# Patient Record
Sex: FEMALE | Race: WHITE | NOT HISPANIC OR LATINO | Employment: FULL TIME | URBAN - METROPOLITAN AREA
[De-identification: names, ages, dates, MRNs, and addresses within clinical notes are randomized per-mention and may not be internally consistent; named-entity substitution may affect disease eponyms.]

---

## 2017-02-28 ENCOUNTER — APPOINTMENT (EMERGENCY)
Dept: RADIOLOGY | Facility: HOSPITAL | Age: 57
End: 2017-02-28
Payer: COMMERCIAL

## 2017-02-28 ENCOUNTER — HOSPITAL ENCOUNTER (EMERGENCY)
Facility: HOSPITAL | Age: 57
Discharge: HOME | End: 2017-02-28
Attending: EMERGENCY MEDICINE | Admitting: EMERGENCY MEDICINE
Payer: COMMERCIAL

## 2017-02-28 VITALS
RESPIRATION RATE: 16 BRPM | HEART RATE: 90 BPM | TEMPERATURE: 98.6 F | OXYGEN SATURATION: 100 % | HEIGHT: 66 IN | BODY MASS INDEX: 33.84 KG/M2 | WEIGHT: 210.54 LBS

## 2017-02-28 DIAGNOSIS — M19.071 ARTHRITIS OF RIGHT ANKLE: Primary | ICD-10-CM

## 2017-02-28 LAB — URATE SERPL-MCNC: 3.5 MG/DL (ref 2–6.8)

## 2017-02-28 PROCEDURE — 36415 COLL VENOUS BLD VENIPUNCTURE: CPT | Performed by: EMERGENCY MEDICINE

## 2017-02-28 PROCEDURE — 84550 ASSAY OF BLOOD/URIC ACID: CPT | Performed by: EMERGENCY MEDICINE

## 2017-02-28 PROCEDURE — 73610 X-RAY EXAM OF ANKLE: CPT

## 2017-02-28 PROCEDURE — 99283 EMERGENCY DEPT VISIT LOW MDM: CPT

## 2017-02-28 RX ORDER — IBUPROFEN 800 MG/1
800 TABLET ORAL EVERY 6 HOURS PRN
Qty: 30 TABLET | Refills: 0 | Status: SHIPPED | OUTPATIENT
Start: 2017-02-28 | End: 2019-01-20

## 2017-02-28 RX ORDER — SIMVASTATIN 40 MG
40 TABLET ORAL
COMMUNITY
End: 2019-01-20

## 2017-08-08 ENCOUNTER — TRANSCRIBE ORDERS (OUTPATIENT)
Dept: ADMINISTRATIVE | Facility: HOSPITAL | Age: 57
End: 2017-08-08

## 2017-08-08 ENCOUNTER — APPOINTMENT (OUTPATIENT)
Dept: LAB | Facility: HOSPITAL | Age: 57
End: 2017-08-08
Payer: COMMERCIAL

## 2017-08-08 DIAGNOSIS — Z00.8 HEALTH EXAMINATION IN POPULATION SURVEY: Primary | ICD-10-CM

## 2017-08-08 DIAGNOSIS — Z00.8 HEALTH EXAMINATION IN POPULATION SURVEY: ICD-10-CM

## 2017-08-08 LAB
CHOLEST SERPL-MCNC: 173 MG/DL (ref 50–200)
EST. AVERAGE GLUCOSE BLD GHB EST-MCNC: 114 MG/DL
HBA1C MFR BLD: 5.6 % (ref 4.2–6.3)
HDLC SERPL-MCNC: 58 MG/DL (ref 40–60)
LDLC SERPL CALC-MCNC: 90 MG/DL (ref 0–100)
TRIGL SERPL-MCNC: 127 MG/DL

## 2017-08-08 PROCEDURE — 80061 LIPID PANEL: CPT

## 2017-08-08 PROCEDURE — 36415 COLL VENOUS BLD VENIPUNCTURE: CPT

## 2017-08-08 PROCEDURE — 83036 HEMOGLOBIN GLYCOSYLATED A1C: CPT

## 2017-09-28 DIAGNOSIS — E78.5 HYPERLIPIDEMIA: ICD-10-CM

## 2017-09-28 DIAGNOSIS — E03.9 HYPOTHYROIDISM: ICD-10-CM

## 2017-09-28 DIAGNOSIS — K91.2 POSTSURGICAL MALABSORPTION, NOT ELSEWHERE CLASSIFIED: ICD-10-CM

## 2017-09-28 DIAGNOSIS — I10 ESSENTIAL (PRIMARY) HYPERTENSION: ICD-10-CM

## 2017-09-28 DIAGNOSIS — D50.8 OTHER IRON DEFICIENCY ANEMIAS: ICD-10-CM

## 2018-01-09 NOTE — RESULT NOTES
Verified Results  (1) CBC/PLT/DIFF 28Jun2016 08:12AM Chanelle Perkins   TW Order Number: [id number]  TW Order Number: [id number]     Test Name Result Flag Reference   WBC COUNT 7 01 Thousand/uL  4 31-10 16   RBC COUNT 4 35 Million/uL  3 81-5 12   HEMOGLOBIN 12 7 g/dL  11 5-15 4   HEMATOCRIT 38 9 %  34 8-46  1   MCV 89 fL  82-98   MCH 29 2 pg  26 8-34 3   MCHC 32 6 g/dL  31 4-37 4   RDW 13 2 %  11 6-15 1   MPV 10 5 fL  8 9-12 7   PLATELET COUNT 292 Thousands/uL  149-390   nRBC AUTOMATED 0 /100 WBCs     NEUTROPHILS RELATIVE PERCENT 55 %  43-75   LYMPHOCYTES RELATIVE PERCENT 35 %  14-44   MONOCYTES RELATIVE PERCENT 7 %  4-12   EOSINOPHILS RELATIVE PERCENT 2 %  0-6   BASOPHILS RELATIVE PERCENT 1 %  0-1   NEUTROPHILS ABSOLUTE COUNT 3 85 Thousands/?L  1 85-7 62   LYMPHOCYTES ABSOLUTE COUNT 2 48 Thousands/?L  0 60-4 47   MONOCYTES ABSOLUTE COUNT 0 52 Thousand/?L  0 17-1 22   EOSINOPHILS ABSOLUTE COUNT 0 11 Thousand/?L  0 00-0 61   BASOPHILS ABSOLUTE COUNT 0 04 Thousands/?L  0 00-0 10     (1) IRON 28Jun2016 08:12AM Chanelle Perkins   TW Order Number: [id number]  TW Order Number: [id number] Order Number: [id number] Order Number: [id number] Order Number: [id number] Order Number: [id number] Order Number: [id number]     Test Name Result Flag Reference   IRON 56 ug/dL       (1) FERRITIN [id number] 08:12AM Chanelle Perkins   TW Order Number: [id number]  TW Order Number: [id number] Order Number: [id number] Order Number: [id number] Order Number: [id number] Order Number: [id number] Order Number: [id number]     Test Name Result Flag Reference   FERRITIN 12 ng/mL  8-388     (1) VITAMIN B12 [id number] 08:12AM Letitia Garcia, [address] [place] [facility] Order Number: [id number]  TW Order Number: [id number] Order Number: [id number] Order Number: [id number] Order Number: [id number] Order Number: [id number] Order Number: [id number]     Test Name Result Flag Reference   VITAMIN B12 298 pg/mL  100-900     (1) VITAMIN D 25-HYDROXY 28Jun2016 08:12AM Mich Pretty    Order Number: [id number]  TW Order Number: [id number]     Test Name Result Flag Reference   VIT D 25-HYDROX 17 1 ng/mL L 30 0-100 0   This assay is a certified procedure of the CDC Vitamin D Standardization Certification Program (VDSCP)     Deficiency <20ng/ml   Insufficiency 20-30ng/ml   Sufficient  ng/ml     *Patients undergoing fluorescein dye angiography may retain small amounts of fluorescein in the body for 48-72 hours post procedure  Samples containing fluorescein can produce falsely elevated Vitamin D values  If the patient had this procedure, a specimen should be resubmitted post fluorescein clearance  (1) COMPREHENSIVE METABOLIC PANEL [id number] 15:33EO Mich Pretty    Order Number: [id number]  TW Order Number: [id number] Order Number: [id number] Order Number: [id number] Order Number: [id number] Order Number: [id number] Order Number: [id number]     Test Name Result Flag Reference   GLUCOSE,RANDM 78 mg/dL     If the patient is fasting, the ADA then defines impaired fasting glucose as > 100 mg/dL and diabetes as > or equal to 123 mg/dL     SODIUM 138 mmol/L  136-145   POTASSIUM 4 2 mmol/L  3 5-5 3   CHLORIDE 106 mmol/L  100-108   CARBON DIOXIDE 28 mmol/L  21-32   ANION GAP (CALC) 4 mmol/L  4-13   BLOOD UREA NITROGEN 23 mg/dL  5-25   CREATININE 0 63 mg/dL  0 60-1 30   Standardized to IDMS reference method   CALCIUM 8 8 mg/dL  8 3-10 1   BILI, TOTAL 0 24 mg/dL  0 20-1 00   ALK PHOSPHATAS 79 U/L     ALT (SGPT) 32 U/L  12-78   AST(SGOT) 19 U/L  5-45   ALBUMIN 3 3 g/dL L 3 5-5 0   TOTAL PROTEIN 7 2 g/dL  6 4-8 2 eGFR Non-African American      >60 0 ml/min/1 73sq m   [facility] Disease Education Program recommendations are as follows:  GFR calculation is accurate only with a steady state creatinine  Chronic Kidney disease less than 60 ml/min/1 73 sq  meters  Kidney failure less than 15 ml/min/1 73 sq  meters  (1) LIPID PANEL, FASTING 28Jun2016 08:12AM Ellen Catalan   TW Order Number: [id number]  TW Order Number: [id number] Order Number: [id number] Order Number: [id number] Order Number: [id number] Order Number: [id number] Order Number: [id number]     Test Name Result Flag Reference   CHOLESTEROL 141 mg/dL     HDL,DIRECT 47 mg/dL  40-60   Specimen collection should occur prior to Metamizole administration due to the potential for falsely depressed results  LDL CHOLESTEROL CALCULATED 67 mg/dL  0-100   Triglyceride:         Normal              <150 mg/dl       Borderline High    150-199 mg/dl       High               200-499 mg/dl       Very High          >499 mg/dl  Cholesterol:         Desirable        <200 mg/dl      Borderline High  200-239 mg/dl      High             >239 mg/dl  HDL Cholesterol:        High    >59 mg/dL      Low     <41 mg/dL  LDL CALCULATED:    This screening LDL is a calculated result  It does not have the accuracy of the Direct Measured LDL in the monitoring of patients with hyperlipidemia and/or statin therapy  Direct Measure LDL (BQO287) must be ordered separately in these patients  TRIGLYCERIDES 133 mg/dL  <=150   Specimen collection should occur prior to N-Acetylcysteine or Metamizole administration due to the potential for falsely depressed results       (1) TSH 51VCC1221 08:12AM Silvio Lai, [address] Order Number: [id number]  TW Order Number: [id number] Order Number: [id number] Order Number: [id number] Order Number: [id number]

## 2018-01-10 NOTE — MISCELLANEOUS
Message   Recorded as Task   Date: 06/24/2016 10:00 AM, Created By: Kamla Pate   Task Name: Follow Up   Assigned To: Elizabeth Edge   Regarding Patient: Jose Armenta, Status: Active   Comment:    Lidya Berg - 24 Jun 2016 10:00 AM     TASK CREATED  Please call patient to schedule yearly follow up appt  Thanks   unable to leave message for pt      Active Problems    1  Arthritis (716 90) (M19 90)   2  Benign diastolic hypertension (053 7) (I10)   3  Encounter for screening for malignant neoplasm of colon (V76 51) (Z12 11)   4  Encounter for screening mammogram for malignant neoplasm of breast (V76 12)   (Z12 31)   5  Hyperlipidemia (272 4) (E78 5)   6  Hypothyroidism (244 9) (E03 9)   7  Iron deficiency anemia secondary to inadequate dietary iron intake (280 1) (D50 8)   8  Need for influenza vaccination (V04 81) (Z23)   9  Obesity (278 00) (E66 9)   10  Postgastrectomy malabsorption (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) (K91 2,Z90 3)   11  Screening for osteoporosis (V82 81) (Z13 820)   12  Status post gastric bypass for obesity (V45 86) (Z98 84)   13  Umbilical hernia (091 3) (K42 9)    Current Meds   1  Biotin 10 MG Oral Tablet; Therapy: 78JFV1610 to Recorded   2  Calcium 1000 + D 1000-800 MG-UNIT Oral Tablet; Therapy: 88HGU4138 to Recorded   3  Levothyroxine Sodium 75 MCG Oral Tablet; TAKE 1 TABLET DAILY AS DIRECTED; Therapy: 58BTN4774 to (Renew:05Apr2016); Last Rx:08Oct2015 Ordered   4  Simvastatin 40 MG Oral Tablet; take one tablet by mouth every day; Therapy: 46UKZ7308 to (Kelly Jama)  Requested for: 58LGF8910; Last   Rx:46Wfl0807 Ordered    Allergies    1   No Known Drug Allergies    Signatures   Electronically signed by : Huy Aj, ; Jun 27 2016  3:34PM EST                       (Author)

## 2018-05-24 ENCOUNTER — OFFICE VISIT (OUTPATIENT)
Dept: URGENT CARE | Facility: CLINIC | Age: 58
End: 2018-05-24
Payer: COMMERCIAL

## 2018-05-24 VITALS
BODY MASS INDEX: 37.12 KG/M2 | DIASTOLIC BLOOD PRESSURE: 92 MMHG | HEART RATE: 92 BPM | SYSTOLIC BLOOD PRESSURE: 140 MMHG | WEIGHT: 231 LBS | TEMPERATURE: 97.9 F | OXYGEN SATURATION: 99 % | RESPIRATION RATE: 16 BRPM | HEIGHT: 66 IN

## 2018-05-24 DIAGNOSIS — H65.91 RIGHT NON-SUPPURATIVE OTITIS MEDIA: ICD-10-CM

## 2018-05-24 DIAGNOSIS — H10.33 ACUTE BACTERIAL CONJUNCTIVITIS OF BOTH EYES: Primary | ICD-10-CM

## 2018-05-24 PROCEDURE — S9088 SERVICES PROVIDED IN URGENT: HCPCS

## 2018-05-24 PROCEDURE — 99203 OFFICE O/P NEW LOW 30 MIN: CPT

## 2018-05-24 RX ORDER — OFLOXACIN 3 MG/ML
1 SOLUTION/ DROPS OPHTHALMIC 4 TIMES DAILY
Qty: 5 ML | Refills: 0 | Status: SHIPPED | OUTPATIENT
Start: 2018-05-24 | End: 2019-01-20

## 2018-05-24 RX ORDER — AMOXICILLIN 500 MG/1
500 CAPSULE ORAL EVERY 8 HOURS SCHEDULED
Qty: 21 CAPSULE | Refills: 0 | Status: SHIPPED | OUTPATIENT
Start: 2018-05-24 | End: 2018-05-31

## 2018-05-24 NOTE — PATIENT INSTRUCTIONS
Follow up with clinic  Return if no improvement within 3 days or sooner if symptoms worsenConjunctivitis   WHAT YOU SHOULD KNOW:   Conjunctivitis, or pink eye, is inflammation of your conjunctiva  The conjunctiva is a thin tissue that covers the front of your eye and the back of your eyelids  The conjunctiva helps protect your eye and keep it moist         INSTRUCTIONS:   Medicines:   · Allergy medicine: This medicine helps decrease itchy, red, swollen eyes caused by allergies  It may be given as a pill, eye drops, or nasal spray  · Antibiotics:  You will need antibiotics if your conjunctivitis is caused by bacteria  This medicine may be given as eye drops or eye ointment  · Steroid medicine: This medicine helps decrease inflammation  It may be given as a pill, eye drops, or nasal spray  · Take your medicine as directed  Call your healthcare provider if you think your medicine is not helping or if you have side effects  Tell him if you are allergic to any medicine  Keep a list of the medicines, vitamins, and herbs you take  Include the amounts, and when and why you take them  Bring the list or the pill bottles to follow-up visits  Carry your medicine list with you in case of an emergency  Follow up with your primary healthcare provider as directed: You may need to return for more tests on your eyes  These will help your primary healthcare provider check for eye damage  Write down your questions so you remember to ask them during your visits  Avoid the spread of conjunctivitis:   · Wash your hands often:  Wash your hands before you touch your eyes  Also wash your hands before you prepare or eat food and after you use the bathroom or change a diaper  · Avoid allergens:  Try to avoid the things that cause your allergies, such as pets, dust, or grass  · Avoid contact:  Do not share towels or washcloths  Try to stay away from others as much as possible  Ask when you can return to work or school  · Throw away eye makeup:  Throw away mascara and other eye makeup  Manage your symptoms:  · Apply a cool compress:  Wet a washcloth with cold water and place it on your eye  This will help decrease swelling  · Use eye drops:  Eye drops, or artificial tears, can be bought without a doctor's order  They help keep your eye moist     · Do not wear contact lenses: They can irritate your eye  Throw away the pair you are using and ask when you can wear them again  Use a new pair of lenses when your primary healthcare provider says it is okay  · Flush your eye:  You may need to flush your eye with saline to help decrease your symptoms  Ask for more information on how to flush your eye  Contact your primary healthcare provider if:   · Your eyesight becomes blurry  · You have tiny bumps or spots of blood on your eye  · You have questions or concerns about your condition or care  Return to the emergency department if:   · The swelling in your eye gets worse, even after treatment  · Your vision suddenly becomes worse or you cannot see at all  · Your eye begins to bleed  © 2014 [address] is for End User's use only and may not be sold, redistributed or otherwise used for commercial purposes  All illustrations and images included in CareNotes® are the copyrighted property of [employer] , [place]  or Negrito Walsh  The above information is an  only  It is not intended as medical advice for individual conditions or treatments  Talk to your doctor, nurse or pharmacist before following any medical regimen to see if it is safe and effective for you

## 2018-05-24 NOTE — PROGRESS NOTES
Assessment/Plan:    Follow up with clinic  Return if no improvement within 3 days or sooner if symptoms worsen      No problem-specific Assessment & Plan notes found for this encounter  Diagnoses and all orders for this visit:    Acute bacterial conjunctivitis of both eyes  -     ofloxacin (OCUFLOX) 0 3 % ophthalmic solution; Administer 1 drop to both eyes 4 (four) times a day    Right non-suppurative otitis media  -     amoxicillin (AMOXIL) 500 mg capsule; Take 1 capsule (500 mg total) by mouth every 8 (eight) hours for 7 days          Subjective:      Patient ID: Harvis Hamman is a 62 y o  female  63 y/o female with no PMH c/o cough productive of yellow sputum, bilateral ear pain right greater than left, and red eyes with discharge x 2 days  Patient states she started coughing 2 days ago and yesterday she woke up with red eyes and crust over eye lashes but no eye pain or change in vision  Patient also c/o pain to both ears  Denies fever, chills, SOB, CP or other      Sore Throat    Associated symptoms include coughing and ear pain  Pertinent negatives include no shortness of breath or stridor  Cough   Associated symptoms include ear pain, eye redness and a sore throat  Pertinent negatives include no chest pain, shortness of breath or wheezing  Earache    Associated symptoms include coughing and a sore throat  Eye Pain    Associated symptoms include an eye discharge, eye redness and itching  Pertinent negatives include no photophobia  The following portions of the patient's history were reviewed and updated as appropriate: allergies, current medications, past family history, past medical history, past social history, past surgical history and problem list     Review of Systems   Constitutional: Negative  HENT: Positive for ear pain and sore throat  Eyes: Positive for discharge, redness and itching  Negative for photophobia and pain  Respiratory: Positive for cough   Negative for chest tightness, shortness of breath, wheezing and stridor  Cardiovascular: Negative for chest pain, palpitations and leg swelling  Objective:      /92 (BP Location: Left arm, Patient Position: Sitting, Cuff Size: Large)   Pulse 92   Temp 97 9 °F (36 6 °C) (Tympanic)   Resp 16   Ht 5' 6" (1 676 m)   Wt 105 kg (231 lb)   SpO2 99%   BMI 37 28 kg/m²          Physical Exam   Constitutional: She appears well-developed and well-nourished  No distress  HENT:   Head: Normocephalic and atraumatic  Right Ear: Hearing, external ear and ear canal normal    Left Ear: Hearing, tympanic membrane, external ear and ear canal normal    Ears:    Mouth/Throat: Uvula is midline, oropharynx is clear and moist and mucous membranes are normal    Eyes: Lids are normal  Lids are everted and swept, no foreign bodies found  Cardiovascular: Normal rate, regular rhythm and normal heart sounds  Pulmonary/Chest: Effort normal and breath sounds normal    Abdominal: Soft  Bowel sounds are normal    Skin: She is not diaphoretic

## 2018-05-31 ENCOUNTER — TELEPHONE (OUTPATIENT)
Dept: BARIATRICS | Facility: CLINIC | Age: 58
End: 2018-05-31

## 2018-07-18 ENCOUNTER — APPOINTMENT (OUTPATIENT)
Dept: LAB | Facility: HOSPITAL | Age: 58
End: 2018-07-18

## 2018-07-18 ENCOUNTER — TRANSCRIBE ORDERS (OUTPATIENT)
Dept: ADMINISTRATIVE | Facility: HOSPITAL | Age: 58
End: 2018-07-18

## 2018-07-18 DIAGNOSIS — Z00.8 HEALTH EXAMINATION IN POPULATION SURVEY: ICD-10-CM

## 2018-07-18 DIAGNOSIS — Z00.8 HEALTH EXAMINATION IN POPULATION SURVEY: Primary | ICD-10-CM

## 2018-07-18 LAB
CHOLEST SERPL-MCNC: 164 MG/DL (ref 50–200)
EST. AVERAGE GLUCOSE BLD GHB EST-MCNC: 108 MG/DL
HBA1C MFR BLD: 5.4 % (ref 4.2–6.3)
HDLC SERPL-MCNC: 46 MG/DL (ref 40–60)
LDLC SERPL CALC-MCNC: 95 MG/DL (ref 0–100)
NONHDLC SERPL-MCNC: 118 MG/DL
TRIGL SERPL-MCNC: 116 MG/DL

## 2018-07-18 PROCEDURE — 80061 LIPID PANEL: CPT

## 2018-07-18 PROCEDURE — 83036 HEMOGLOBIN GLYCOSYLATED A1C: CPT

## 2018-07-18 PROCEDURE — 36415 COLL VENOUS BLD VENIPUNCTURE: CPT

## 2019-01-20 ENCOUNTER — ANESTHESIA EVENT (EMERGENCY)
Dept: PERIOP | Facility: HOSPITAL | Age: 59
DRG: 329 | End: 2019-01-20
Payer: COMMERCIAL

## 2019-01-20 ENCOUNTER — APPOINTMENT (EMERGENCY)
Dept: CT IMAGING | Facility: HOSPITAL | Age: 59
DRG: 329 | End: 2019-01-20
Payer: COMMERCIAL

## 2019-01-20 ENCOUNTER — HOSPITAL ENCOUNTER (INPATIENT)
Facility: HOSPITAL | Age: 59
LOS: 3 days | Discharge: HOME | DRG: 329 | End: 2019-01-24
Attending: EMERGENCY MEDICINE | Admitting: ANESTHESIOLOGY
Payer: COMMERCIAL

## 2019-01-20 ENCOUNTER — ANESTHESIA (EMERGENCY)
Dept: PERIOP | Facility: HOSPITAL | Age: 59
DRG: 329 | End: 2019-01-20
Payer: COMMERCIAL

## 2019-01-20 DIAGNOSIS — Z98.890 S/P EXPLORATORY LAPAROTOMY: ICD-10-CM

## 2019-01-20 DIAGNOSIS — I10 HYPERTENSION: ICD-10-CM

## 2019-01-20 DIAGNOSIS — K45.8 INTERNAL HERNIA: Primary | ICD-10-CM

## 2019-01-20 DIAGNOSIS — K55.029 ISCHEMIC NECROSIS OF SMALL BOWEL (HCC): ICD-10-CM

## 2019-01-20 LAB
ABO GROUP BLD: NORMAL
ALBUMIN SERPL BCP-MCNC: 3.7 G/DL (ref 3.5–5)
ALP SERPL-CCNC: 93 U/L (ref 46–116)
ALT SERPL W P-5'-P-CCNC: 26 U/L (ref 12–78)
ANION GAP SERPL CALCULATED.3IONS-SCNC: 10 MMOL/L (ref 4–13)
AST SERPL W P-5'-P-CCNC: 19 U/L (ref 5–45)
BASOPHILS # BLD AUTO: 0.05 THOUSANDS/ΜL (ref 0–0.1)
BASOPHILS NFR BLD AUTO: 0 % (ref 0–1)
BILIRUB SERPL-MCNC: 0.2 MG/DL (ref 0.2–1)
BILIRUB UR QL STRIP: NEGATIVE
BLD GP AB SCN SERPL QL: NEGATIVE
BUN SERPL-MCNC: 25 MG/DL (ref 5–25)
CALCIUM SERPL-MCNC: 9 MG/DL (ref 8.3–10.1)
CHLORIDE SERPL-SCNC: 105 MMOL/L (ref 100–108)
CLARITY UR: CLEAR
CO2 SERPL-SCNC: 26 MMOL/L (ref 21–32)
COLOR UR: YELLOW
CREAT SERPL-MCNC: 0.79 MG/DL (ref 0.6–1.3)
EOSINOPHIL # BLD AUTO: 0.11 THOUSAND/ΜL (ref 0–0.61)
EOSINOPHIL NFR BLD AUTO: 1 % (ref 0–6)
ERYTHROCYTE [DISTWIDTH] IN BLOOD BY AUTOMATED COUNT: 13.1 % (ref 11.6–15.1)
GFR SERPL CREATININE-BSD FRML MDRD: 83 ML/MIN/1.73SQ M
GLUCOSE SERPL-MCNC: 157 MG/DL (ref 65–140)
GLUCOSE UR STRIP-MCNC: NEGATIVE MG/DL
HCT VFR BLD AUTO: 38.7 % (ref 34.8–46.1)
HGB BLD-MCNC: 12.6 G/DL (ref 11.5–15.4)
HGB UR QL STRIP.AUTO: NEGATIVE
IMM GRANULOCYTES # BLD AUTO: 0.09 THOUSAND/UL (ref 0–0.2)
IMM GRANULOCYTES NFR BLD AUTO: 1 % (ref 0–2)
INR PPP: 1.01 (ref 0.86–1.17)
KETONES UR STRIP-MCNC: ABNORMAL MG/DL
LACTATE SERPL-SCNC: 1.2 MMOL/L (ref 0.5–2)
LEUKOCYTE ESTERASE UR QL STRIP: NEGATIVE
LYMPHOCYTES # BLD AUTO: 2.01 THOUSANDS/ΜL (ref 0.6–4.47)
LYMPHOCYTES NFR BLD AUTO: 11 % (ref 14–44)
MCH RBC QN AUTO: 28.3 PG (ref 26.8–34.3)
MCHC RBC AUTO-ENTMCNC: 32.6 G/DL (ref 31.4–37.4)
MCV RBC AUTO: 87 FL (ref 82–98)
MONOCYTES # BLD AUTO: 0.6 THOUSAND/ΜL (ref 0.17–1.22)
MONOCYTES NFR BLD AUTO: 3 % (ref 4–12)
NEUTROPHILS # BLD AUTO: 15.11 THOUSANDS/ΜL (ref 1.85–7.62)
NEUTS SEG NFR BLD AUTO: 84 % (ref 43–75)
NITRITE UR QL STRIP: NEGATIVE
NRBC BLD AUTO-RTO: 0 /100 WBCS
PH UR STRIP.AUTO: 7 [PH] (ref 4.5–8)
PLATELET # BLD AUTO: 363 THOUSANDS/UL (ref 149–390)
PMV BLD AUTO: 10.3 FL (ref 8.9–12.7)
POTASSIUM SERPL-SCNC: 3.6 MMOL/L (ref 3.5–5.3)
PROT SERPL-MCNC: 7.7 G/DL (ref 6.4–8.2)
PROT UR STRIP-MCNC: NEGATIVE MG/DL
PROTHROMBIN TIME: 13.2 SECONDS (ref 11.8–14.2)
RBC # BLD AUTO: 4.45 MILLION/UL (ref 3.81–5.12)
RH BLD: POSITIVE
SODIUM SERPL-SCNC: 141 MMOL/L (ref 136–145)
SP GR UR STRIP.AUTO: 1.01 (ref 1–1.03)
SPECIMEN EXPIRATION DATE: NORMAL
TROPONIN I SERPL-MCNC: <0.02 NG/ML
UROBILINOGEN UR QL STRIP.AUTO: 0.2 E.U./DL
WBC # BLD AUTO: 17.97 THOUSAND/UL (ref 4.31–10.16)

## 2019-01-20 PROCEDURE — 86850 RBC ANTIBODY SCREEN: CPT | Performed by: EMERGENCY MEDICINE

## 2019-01-20 PROCEDURE — 44120 REMOVAL OF SMALL INTESTINE: CPT | Performed by: SURGERY

## 2019-01-20 PROCEDURE — 86901 BLOOD TYPING SEROLOGIC RH(D): CPT | Performed by: EMERGENCY MEDICINE

## 2019-01-20 PROCEDURE — 99244 OFF/OP CNSLTJ NEW/EST MOD 40: CPT | Performed by: SURGERY

## 2019-01-20 PROCEDURE — 36415 COLL VENOUS BLD VENIPUNCTURE: CPT | Performed by: EMERGENCY MEDICINE

## 2019-01-20 PROCEDURE — 96361 HYDRATE IV INFUSION ADD-ON: CPT

## 2019-01-20 PROCEDURE — 83605 ASSAY OF LACTIC ACID: CPT | Performed by: EMERGENCY MEDICINE

## 2019-01-20 PROCEDURE — 96374 THER/PROPH/DIAG INJ IV PUSH: CPT

## 2019-01-20 PROCEDURE — 44120 REMOVAL OF SMALL INTESTINE: CPT | Performed by: PHYSICIAN ASSISTANT

## 2019-01-20 PROCEDURE — 80053 COMPREHEN METABOLIC PANEL: CPT | Performed by: EMERGENCY MEDICINE

## 2019-01-20 PROCEDURE — 81003 URINALYSIS AUTO W/O SCOPE: CPT | Performed by: EMERGENCY MEDICINE

## 2019-01-20 PROCEDURE — 85610 PROTHROMBIN TIME: CPT | Performed by: EMERGENCY MEDICINE

## 2019-01-20 PROCEDURE — 86900 BLOOD TYPING SEROLOGIC ABO: CPT | Performed by: EMERGENCY MEDICINE

## 2019-01-20 PROCEDURE — 84484 ASSAY OF TROPONIN QUANT: CPT | Performed by: EMERGENCY MEDICINE

## 2019-01-20 PROCEDURE — 99285 EMERGENCY DEPT VISIT HI MDM: CPT

## 2019-01-20 PROCEDURE — 74177 CT ABD & PELVIS W/CONTRAST: CPT

## 2019-01-20 PROCEDURE — 85025 COMPLETE CBC W/AUTO DIFF WBC: CPT | Performed by: EMERGENCY MEDICINE

## 2019-01-20 PROCEDURE — 93005 ELECTROCARDIOGRAM TRACING: CPT

## 2019-01-20 PROCEDURE — 0DB80ZZ EXCISION OF SMALL INTESTINE, OPEN APPROACH: ICD-10-PCS | Performed by: SURGERY

## 2019-01-20 PROCEDURE — 96375 TX/PRO/DX INJ NEW DRUG ADDON: CPT

## 2019-01-20 RX ORDER — ONDANSETRON 2 MG/ML
4 INJECTION INTRAMUSCULAR; INTRAVENOUS ONCE
Status: COMPLETED | OUTPATIENT
Start: 2019-01-20 | End: 2019-01-20

## 2019-01-20 RX ORDER — HYDROMORPHONE HCL/PF 1 MG/ML
1 SYRINGE (ML) INJECTION ONCE
Status: COMPLETED | OUTPATIENT
Start: 2019-01-20 | End: 2019-01-20

## 2019-01-20 RX ORDER — MORPHINE SULFATE 10 MG/ML
6 INJECTION, SOLUTION INTRAMUSCULAR; INTRAVENOUS ONCE
Status: COMPLETED | OUTPATIENT
Start: 2019-01-20 | End: 2019-01-20

## 2019-01-20 RX ORDER — DIPHENHYDRAMINE HYDROCHLORIDE 50 MG/ML
50 INJECTION INTRAMUSCULAR; INTRAVENOUS ONCE
Status: COMPLETED | OUTPATIENT
Start: 2019-01-20 | End: 2019-01-20

## 2019-01-20 RX ADMIN — HYDROMORPHONE HYDROCHLORIDE 1 MG: 1 INJECTION, SOLUTION INTRAMUSCULAR; INTRAVENOUS; SUBCUTANEOUS at 21:03

## 2019-01-20 RX ADMIN — MIDAZOLAM HYDROCHLORIDE 2 MG: 1 INJECTION, SOLUTION INTRAMUSCULAR; INTRAVENOUS at 23:38

## 2019-01-20 RX ADMIN — IOHEXOL 50 ML: 240 INJECTION, SOLUTION INTRATHECAL; INTRAVASCULAR; INTRAVENOUS; ORAL at 21:59

## 2019-01-20 RX ADMIN — Medication 140 MG: at 23:45

## 2019-01-20 RX ADMIN — IOHEXOL 100 ML: 350 INJECTION, SOLUTION INTRAVENOUS at 20:46

## 2019-01-20 RX ADMIN — FENTANYL CITRATE 100 MCG: 50 INJECTION, SOLUTION INTRAMUSCULAR; INTRAVENOUS at 23:45

## 2019-01-20 RX ADMIN — LIDOCAINE HYDROCHLORIDE 50 MG: 10 INJECTION, SOLUTION INFILTRATION; PERINEURAL at 23:45

## 2019-01-20 RX ADMIN — SODIUM CHLORIDE: 0.9 INJECTION, SOLUTION INTRAVENOUS at 23:30

## 2019-01-20 RX ADMIN — SODIUM CHLORIDE 1000 ML: 0.9 INJECTION, SOLUTION INTRAVENOUS at 22:31

## 2019-01-20 RX ADMIN — DIPHENHYDRAMINE HYDROCHLORIDE 50 MG: 50 INJECTION INTRAMUSCULAR; INTRAVENOUS at 21:03

## 2019-01-20 RX ADMIN — PROPOFOL 100 MG: 10 INJECTION, EMULSION INTRAVENOUS at 23:45

## 2019-01-20 RX ADMIN — MORPHINE SULFATE 6 MG: 10 INJECTION INTRAVENOUS at 20:01

## 2019-01-20 RX ADMIN — ONDANSETRON 4 MG: 2 INJECTION INTRAMUSCULAR; INTRAVENOUS at 19:57

## 2019-01-21 PROBLEM — K55.029 ISCHEMIC NECROSIS OF SMALL BOWEL (HCC): Status: ACTIVE | Noted: 2019-01-21

## 2019-01-21 PROBLEM — I10 HYPERTENSION: Status: ACTIVE | Noted: 2019-01-21

## 2019-01-21 PROBLEM — E07.9 DISEASE OF THYROID GLAND: Status: ACTIVE | Noted: 2019-01-21

## 2019-01-21 PROBLEM — K45.8 INTERNAL HERNIA: Status: ACTIVE | Noted: 2019-01-21

## 2019-01-21 LAB
ALBUMIN SERPL BCP-MCNC: 3 G/DL (ref 3.5–5)
ALP SERPL-CCNC: 80 U/L (ref 46–116)
ALT SERPL W P-5'-P-CCNC: 21 U/L (ref 12–78)
ANION GAP SERPL CALCULATED.3IONS-SCNC: 8 MMOL/L (ref 4–13)
ANISOCYTOSIS BLD QL SMEAR: PRESENT
AST SERPL W P-5'-P-CCNC: 24 U/L (ref 5–45)
ATRIAL RATE: 50 BPM
ATRIAL RATE: 55 BPM
ATRIAL RATE: 55 BPM
BASOPHILS # BLD MANUAL: 0 THOUSAND/UL (ref 0–0.1)
BASOPHILS NFR MAR MANUAL: 0 % (ref 0–1)
BILIRUB SERPL-MCNC: 0.2 MG/DL (ref 0.2–1)
BUN SERPL-MCNC: 19 MG/DL (ref 5–25)
CA-I BLD-SCNC: 1.13 MMOL/L (ref 1.12–1.32)
CALCIUM SERPL-MCNC: 8.4 MG/DL (ref 8.3–10.1)
CHLORIDE SERPL-SCNC: 107 MMOL/L (ref 100–108)
CO2 SERPL-SCNC: 26 MMOL/L (ref 21–32)
CREAT SERPL-MCNC: 0.8 MG/DL (ref 0.6–1.3)
EOSINOPHIL # BLD MANUAL: 0 THOUSAND/UL (ref 0–0.4)
EOSINOPHIL NFR BLD MANUAL: 0 % (ref 0–6)
ERYTHROCYTE [DISTWIDTH] IN BLOOD BY AUTOMATED COUNT: 13.2 % (ref 11.6–15.1)
GFR SERPL CREATININE-BSD FRML MDRD: 82 ML/MIN/1.73SQ M
GLUCOSE SERPL-MCNC: 110 MG/DL (ref 65–140)
GLUCOSE SERPL-MCNC: 155 MG/DL (ref 65–140)
GLUCOSE SERPL-MCNC: 158 MG/DL (ref 65–140)
HCT VFR BLD AUTO: 34.5 % (ref 34.8–46.1)
HGB BLD-MCNC: 11 G/DL (ref 11.5–15.4)
INR PPP: 1.18 (ref 0.86–1.17)
LYMPHOCYTES # BLD AUTO: 1.17 THOUSAND/UL (ref 0.6–4.47)
LYMPHOCYTES # BLD AUTO: 7 % (ref 14–44)
MAGNESIUM SERPL-MCNC: 1.8 MG/DL (ref 1.6–2.6)
MCH RBC QN AUTO: 28.1 PG (ref 26.8–34.3)
MCHC RBC AUTO-ENTMCNC: 31.9 G/DL (ref 31.4–37.4)
MCV RBC AUTO: 88 FL (ref 82–98)
MONOCYTES # BLD AUTO: 1.17 THOUSAND/UL (ref 0–1.22)
MONOCYTES NFR BLD: 7 % (ref 4–12)
NEUTROPHILS # BLD MANUAL: 14.39 THOUSAND/UL (ref 1.85–7.62)
NEUTS BAND NFR BLD MANUAL: 6 % (ref 0–8)
NEUTS SEG NFR BLD AUTO: 80 % (ref 43–75)
NRBC BLD AUTO-RTO: 0 /100 WBCS
P AXIS: 53 DEGREES
P AXIS: 63 DEGREES
P AXIS: 67 DEGREES
PHOSPHATE SERPL-MCNC: 3.3 MG/DL (ref 2.7–4.5)
PLATELET # BLD AUTO: 277 THOUSANDS/UL (ref 149–390)
PLATELET BLD QL SMEAR: ADEQUATE
PMV BLD AUTO: 9.9 FL (ref 8.9–12.7)
POTASSIUM SERPL-SCNC: 4 MMOL/L (ref 3.5–5.3)
PR INTERVAL: 168 MS
PR INTERVAL: 168 MS
PR INTERVAL: 176 MS
PROCALCITONIN SERPL-MCNC: <0.05 NG/ML
PROT SERPL-MCNC: 6.7 G/DL (ref 6.4–8.2)
PROTHROMBIN TIME: 14.9 SECONDS (ref 11.8–14.2)
QRS AXIS: 31 DEGREES
QRS AXIS: 33 DEGREES
QRS AXIS: 44 DEGREES
QRSD INTERVAL: 90 MS
QRSD INTERVAL: 96 MS
QRSD INTERVAL: 98 MS
QT INTERVAL: 482 MS
QT INTERVAL: 498 MS
QT INTERVAL: 508 MS
QTC INTERVAL: 461 MS
QTC INTERVAL: 463 MS
QTC INTERVAL: 476 MS
RBC # BLD AUTO: 3.92 MILLION/UL (ref 3.81–5.12)
SODIUM SERPL-SCNC: 141 MMOL/L (ref 136–145)
T WAVE AXIS: 49 DEGREES
T WAVE AXIS: 61 DEGREES
T WAVE AXIS: 62 DEGREES
TOTAL CELLS COUNTED SPEC: 100
VENTRICULAR RATE: 50 BPM
VENTRICULAR RATE: 55 BPM
VENTRICULAR RATE: 55 BPM
WBC # BLD AUTO: 16.73 THOUSAND/UL (ref 4.31–10.16)

## 2019-01-21 PROCEDURE — G8979 MOBILITY GOAL STATUS: HCPCS

## 2019-01-21 PROCEDURE — 93010 ELECTROCARDIOGRAM REPORT: CPT | Performed by: INTERNAL MEDICINE

## 2019-01-21 PROCEDURE — C9113 INJ PANTOPRAZOLE SODIUM, VIA: HCPCS | Performed by: NURSE PRACTITIONER

## 2019-01-21 PROCEDURE — 85027 COMPLETE CBC AUTOMATED: CPT | Performed by: NURSE PRACTITIONER

## 2019-01-21 PROCEDURE — 85610 PROTHROMBIN TIME: CPT | Performed by: NURSE PRACTITIONER

## 2019-01-21 PROCEDURE — 82948 REAGENT STRIP/BLOOD GLUCOSE: CPT

## 2019-01-21 PROCEDURE — 83735 ASSAY OF MAGNESIUM: CPT | Performed by: NURSE PRACTITIONER

## 2019-01-21 PROCEDURE — 82330 ASSAY OF CALCIUM: CPT | Performed by: NURSE PRACTITIONER

## 2019-01-21 PROCEDURE — 88307 TISSUE EXAM BY PATHOLOGIST: CPT | Performed by: PATHOLOGY

## 2019-01-21 PROCEDURE — G8978 MOBILITY CURRENT STATUS: HCPCS

## 2019-01-21 PROCEDURE — 97163 PT EVAL HIGH COMPLEX 45 MIN: CPT

## 2019-01-21 PROCEDURE — 84100 ASSAY OF PHOSPHORUS: CPT | Performed by: NURSE PRACTITIONER

## 2019-01-21 PROCEDURE — 85007 BL SMEAR W/DIFF WBC COUNT: CPT | Performed by: NURSE PRACTITIONER

## 2019-01-21 PROCEDURE — 84145 PROCALCITONIN (PCT): CPT | Performed by: NURSE PRACTITIONER

## 2019-01-21 PROCEDURE — 80053 COMPREHEN METABOLIC PANEL: CPT | Performed by: NURSE PRACTITIONER

## 2019-01-21 PROCEDURE — 99222 1ST HOSP IP/OBS MODERATE 55: CPT | Performed by: STUDENT IN AN ORGANIZED HEALTH CARE EDUCATION/TRAINING PROGRAM

## 2019-01-21 RX ORDER — PANTOPRAZOLE SODIUM 40 MG/1
40 INJECTION, POWDER, FOR SOLUTION INTRAVENOUS EVERY 12 HOURS SCHEDULED
Status: DISCONTINUED | OUTPATIENT
Start: 2019-01-21 | End: 2019-01-24 | Stop reason: HOSPADM

## 2019-01-21 RX ORDER — SUCCINYLCHOLINE/SOD CL,ISO/PF 100 MG/5ML
SYRINGE (ML) INTRAVENOUS AS NEEDED
Status: DISCONTINUED | OUTPATIENT
Start: 2019-01-20 | End: 2019-01-21 | Stop reason: SURG

## 2019-01-21 RX ORDER — SODIUM CHLORIDE, SODIUM LACTATE, POTASSIUM CHLORIDE, CALCIUM CHLORIDE 600; 310; 30; 20 MG/100ML; MG/100ML; MG/100ML; MG/100ML
50 INJECTION, SOLUTION INTRAVENOUS CONTINUOUS
Status: DISCONTINUED | OUTPATIENT
Start: 2019-01-21 | End: 2019-01-21

## 2019-01-21 RX ORDER — FENTANYL CITRATE 50 UG/ML
INJECTION, SOLUTION INTRAMUSCULAR; INTRAVENOUS AS NEEDED
Status: DISCONTINUED | OUTPATIENT
Start: 2019-01-20 | End: 2019-01-21 | Stop reason: SURG

## 2019-01-21 RX ORDER — HYDROMORPHONE HCL/PF 1 MG/ML
0.4 SYRINGE (ML) INJECTION
Status: DISCONTINUED | OUTPATIENT
Start: 2019-01-21 | End: 2019-01-21 | Stop reason: HOSPADM

## 2019-01-21 RX ORDER — FENTANYL CITRATE/PF 50 MCG/ML
50 SYRINGE (ML) INJECTION
Status: DISCONTINUED | OUTPATIENT
Start: 2019-01-21 | End: 2019-01-21 | Stop reason: HOSPADM

## 2019-01-21 RX ORDER — OXYCODONE HYDROCHLORIDE 5 MG/1
5 TABLET ORAL
Status: DISCONTINUED | OUTPATIENT
Start: 2019-01-21 | End: 2019-01-24 | Stop reason: HOSPADM

## 2019-01-21 RX ORDER — GLYCOPYRROLATE 0.2 MG/ML
INJECTION INTRAMUSCULAR; INTRAVENOUS AS NEEDED
Status: DISCONTINUED | OUTPATIENT
Start: 2019-01-21 | End: 2019-01-21 | Stop reason: SURG

## 2019-01-21 RX ORDER — SODIUM CHLORIDE 9 MG/ML
75 INJECTION, SOLUTION INTRAVENOUS CONTINUOUS
Status: DISCONTINUED | OUTPATIENT
Start: 2019-01-21 | End: 2019-01-21

## 2019-01-21 RX ORDER — CEFAZOLIN SODIUM 1 G/3ML
INJECTION, POWDER, FOR SOLUTION INTRAMUSCULAR; INTRAVENOUS AS NEEDED
Status: DISCONTINUED | OUTPATIENT
Start: 2019-01-21 | End: 2019-01-21 | Stop reason: SURG

## 2019-01-21 RX ORDER — OXYCODONE HYDROCHLORIDE 10 MG/1
10 TABLET ORAL
Status: DISCONTINUED | OUTPATIENT
Start: 2019-01-21 | End: 2019-01-24 | Stop reason: HOSPADM

## 2019-01-21 RX ORDER — SODIUM CHLORIDE 9 MG/ML
INJECTION, SOLUTION INTRAVENOUS CONTINUOUS PRN
Status: DISCONTINUED | OUTPATIENT
Start: 2019-01-20 | End: 2019-01-21 | Stop reason: SURG

## 2019-01-21 RX ORDER — ONDANSETRON 2 MG/ML
4 INJECTION INTRAMUSCULAR; INTRAVENOUS EVERY 4 HOURS PRN
Status: DISCONTINUED | OUTPATIENT
Start: 2019-01-21 | End: 2019-01-24 | Stop reason: HOSPADM

## 2019-01-21 RX ORDER — OXYCODONE HYDROCHLORIDE 5 MG/1
5 TABLET ORAL EVERY 6 HOURS PRN
Status: DISCONTINUED | OUTPATIENT
Start: 2019-01-21 | End: 2019-01-21

## 2019-01-21 RX ORDER — HYDROMORPHONE HCL/PF 1 MG/ML
0.5 SYRINGE (ML) INJECTION EVERY 4 HOURS PRN
Status: DISCONTINUED | OUTPATIENT
Start: 2019-01-21 | End: 2019-01-24 | Stop reason: HOSPADM

## 2019-01-21 RX ORDER — MEPERIDINE HYDROCHLORIDE 50 MG/ML
12.5 INJECTION INTRAMUSCULAR; INTRAVENOUS; SUBCUTANEOUS
Status: COMPLETED | OUTPATIENT
Start: 2019-01-21 | End: 2019-01-21

## 2019-01-21 RX ORDER — MAGNESIUM HYDROXIDE 1200 MG/15ML
LIQUID ORAL AS NEEDED
Status: DISCONTINUED | OUTPATIENT
Start: 2019-01-21 | End: 2019-01-21 | Stop reason: HOSPADM

## 2019-01-21 RX ORDER — EPHEDRINE SULFATE 50 MG/ML
INJECTION, SOLUTION INTRAVENOUS AS NEEDED
Status: DISCONTINUED | OUTPATIENT
Start: 2019-01-21 | End: 2019-01-21 | Stop reason: SURG

## 2019-01-21 RX ORDER — ONDANSETRON 2 MG/ML
4 INJECTION INTRAMUSCULAR; INTRAVENOUS EVERY 6 HOURS PRN
Status: DISCONTINUED | OUTPATIENT
Start: 2019-01-21 | End: 2019-01-21

## 2019-01-21 RX ORDER — ONDANSETRON 2 MG/ML
INJECTION INTRAMUSCULAR; INTRAVENOUS AS NEEDED
Status: DISCONTINUED | OUTPATIENT
Start: 2019-01-21 | End: 2019-01-21 | Stop reason: SURG

## 2019-01-21 RX ORDER — PROMETHAZINE HYDROCHLORIDE 25 MG/ML
12.5 INJECTION, SOLUTION INTRAMUSCULAR; INTRAVENOUS ONCE AS NEEDED
Status: DISCONTINUED | OUTPATIENT
Start: 2019-01-21 | End: 2019-01-21 | Stop reason: HOSPADM

## 2019-01-21 RX ORDER — NEOSTIGMINE METHYLSULFATE 1 MG/ML
INJECTION INTRAVENOUS AS NEEDED
Status: DISCONTINUED | OUTPATIENT
Start: 2019-01-21 | End: 2019-01-21 | Stop reason: SURG

## 2019-01-21 RX ORDER — LIDOCAINE HYDROCHLORIDE 10 MG/ML
INJECTION, SOLUTION INFILTRATION; PERINEURAL AS NEEDED
Status: DISCONTINUED | OUTPATIENT
Start: 2019-01-20 | End: 2019-01-21 | Stop reason: SURG

## 2019-01-21 RX ORDER — ONDANSETRON 2 MG/ML
4 INJECTION INTRAMUSCULAR; INTRAVENOUS ONCE AS NEEDED
Status: DISCONTINUED | OUTPATIENT
Start: 2019-01-21 | End: 2019-01-21 | Stop reason: HOSPADM

## 2019-01-21 RX ORDER — ALBUTEROL SULFATE 2.5 MG/3ML
2.5 SOLUTION RESPIRATORY (INHALATION) ONCE AS NEEDED
Status: DISCONTINUED | OUTPATIENT
Start: 2019-01-21 | End: 2019-01-21 | Stop reason: HOSPADM

## 2019-01-21 RX ORDER — METOCLOPRAMIDE HYDROCHLORIDE 5 MG/ML
10 INJECTION INTRAMUSCULAR; INTRAVENOUS ONCE AS NEEDED
Status: DISCONTINUED | OUTPATIENT
Start: 2019-01-21 | End: 2019-01-21 | Stop reason: HOSPADM

## 2019-01-21 RX ORDER — ROCURONIUM BROMIDE 10 MG/ML
INJECTION, SOLUTION INTRAVENOUS AS NEEDED
Status: DISCONTINUED | OUTPATIENT
Start: 2019-01-21 | End: 2019-01-21 | Stop reason: SURG

## 2019-01-21 RX ORDER — BUPIVACAINE HYDROCHLORIDE 5 MG/ML
INJECTION, SOLUTION EPIDURAL; INTRACAUDAL AS NEEDED
Status: DISCONTINUED | OUTPATIENT
Start: 2019-01-21 | End: 2019-01-21 | Stop reason: HOSPADM

## 2019-01-21 RX ORDER — DEXTROSE, SODIUM CHLORIDE, AND POTASSIUM CHLORIDE 5; .45; .15 G/100ML; G/100ML; G/100ML
75 INJECTION INTRAVENOUS CONTINUOUS
Status: DISCONTINUED | OUTPATIENT
Start: 2019-01-21 | End: 2019-01-24

## 2019-01-21 RX ORDER — MIDAZOLAM HYDROCHLORIDE 1 MG/ML
INJECTION INTRAMUSCULAR; INTRAVENOUS AS NEEDED
Status: DISCONTINUED | OUTPATIENT
Start: 2019-01-20 | End: 2019-01-21 | Stop reason: SURG

## 2019-01-21 RX ORDER — PROPOFOL 10 MG/ML
INJECTION, EMULSION INTRAVENOUS AS NEEDED
Status: DISCONTINUED | OUTPATIENT
Start: 2019-01-20 | End: 2019-01-21 | Stop reason: SURG

## 2019-01-21 RX ADMIN — ENOXAPARIN SODIUM 40 MG: 40 INJECTION SUBCUTANEOUS at 10:19

## 2019-01-21 RX ADMIN — MORPHINE SULFATE 2 MG: 2 INJECTION, SOLUTION INTRAMUSCULAR; INTRAVENOUS at 11:34

## 2019-01-21 RX ADMIN — EPHEDRINE SULFATE 5 MG: 50 INJECTION, SOLUTION INTRAMUSCULAR; INTRAVENOUS; SUBCUTANEOUS at 01:19

## 2019-01-21 RX ADMIN — PIPERACILLIN SODIUM,TAZOBACTAM SODIUM 3.38 G: 3; .375 INJECTION, POWDER, FOR SOLUTION INTRAVENOUS at 22:40

## 2019-01-21 RX ADMIN — OXYCODONE HYDROCHLORIDE 5 MG: 5 TABLET ORAL at 16:25

## 2019-01-21 RX ADMIN — SODIUM CHLORIDE 75 ML/HR: 0.9 INJECTION, SOLUTION INTRAVENOUS at 03:12

## 2019-01-21 RX ADMIN — CEFAZOLIN 2000 MG: 1 INJECTION, POWDER, FOR SOLUTION INTRAVENOUS at 00:00

## 2019-01-21 RX ADMIN — PANTOPRAZOLE SODIUM 40 MG: 40 INJECTION, POWDER, FOR SOLUTION INTRAVENOUS at 10:19

## 2019-01-21 RX ADMIN — ROCURONIUM BROMIDE 30 MG: 10 INJECTION INTRAVENOUS at 00:03

## 2019-01-21 RX ADMIN — PIPERACILLIN SODIUM,TAZOBACTAM SODIUM 3.38 G: 3; .375 INJECTION, POWDER, FOR SOLUTION INTRAVENOUS at 16:25

## 2019-01-21 RX ADMIN — PIPERACILLIN SODIUM,TAZOBACTAM SODIUM 3.38 G: 3; .375 INJECTION, POWDER, FOR SOLUTION INTRAVENOUS at 10:19

## 2019-01-21 RX ADMIN — MORPHINE SULFATE 2 MG: 2 INJECTION, SOLUTION INTRAMUSCULAR; INTRAVENOUS at 14:38

## 2019-01-21 RX ADMIN — SODIUM CHLORIDE: 0.9 INJECTION, SOLUTION INTRAVENOUS at 01:05

## 2019-01-21 RX ADMIN — SODIUM CHLORIDE: 0.9 INJECTION, SOLUTION INTRAVENOUS at 01:45

## 2019-01-21 RX ADMIN — PIPERACILLIN SODIUM,TAZOBACTAM SODIUM 3.38 G: 3; .375 INJECTION, POWDER, FOR SOLUTION INTRAVENOUS at 03:45

## 2019-01-21 RX ADMIN — ONDANSETRON 4 MG: 2 INJECTION INTRAMUSCULAR; INTRAVENOUS at 01:43

## 2019-01-21 RX ADMIN — MEPERIDINE HYDROCHLORIDE 12.5 MG: 50 INJECTION INTRAMUSCULAR; INTRAVENOUS; SUBCUTANEOUS at 02:39

## 2019-01-21 RX ADMIN — Medication 1 TABLET: at 19:36

## 2019-01-21 RX ADMIN — NEOSTIGMINE METHYLSULFATE 4 MG: 1 INJECTION, SOLUTION INTRAVENOUS at 01:43

## 2019-01-21 RX ADMIN — MEPERIDINE HYDROCHLORIDE 12.5 MG: 50 INJECTION INTRAMUSCULAR; INTRAVENOUS; SUBCUTANEOUS at 02:28

## 2019-01-21 RX ADMIN — PANTOPRAZOLE SODIUM 40 MG: 40 INJECTION, POWDER, FOR SOLUTION INTRAVENOUS at 22:41

## 2019-01-21 RX ADMIN — OXYCODONE HYDROCHLORIDE 5 MG: 5 TABLET ORAL at 22:41

## 2019-01-21 RX ADMIN — ROCURONIUM BROMIDE 20 MG: 10 INJECTION INTRAVENOUS at 00:24

## 2019-01-21 RX ADMIN — GLYCOPYRROLATE 0.8 MG: 0.2 INJECTION, SOLUTION INTRAMUSCULAR; INTRAVENOUS at 01:43

## 2019-01-21 RX ADMIN — HYDROMORPHONE HYDROCHLORIDE 0.5 MG: 1 INJECTION, SOLUTION INTRAMUSCULAR; INTRAVENOUS; SUBCUTANEOUS at 19:36

## 2019-01-21 RX ADMIN — DEXTROSE, SODIUM CHLORIDE, AND POTASSIUM CHLORIDE 100 ML/HR: 5; .45; .15 INJECTION INTRAVENOUS at 19:38

## 2019-01-21 RX ADMIN — DEXAMETHASONE SODIUM PHOSPHATE 10 MG: 10 INJECTION INTRAMUSCULAR; INTRAVENOUS at 00:18

## 2019-01-21 NOTE — PHYSICAL THERAPY NOTE
Physical Therapy Evaluation     Patient's Name: Pierre Caceres    Admitting Diagnosis  Abdominal pain [R10 9]  Internal hernia [K45 8]    Problem List  Patient Active Problem List   Diagnosis    Ischemic necrosis of small bowel ([address] )    Internal hernia    Hypertension    Disease of thyroid gland       Past Medical History  Past Medical History:   Diagnosis Date    Disease of thyroid gland     Hypertension        Past Surgical History  Past Surgical History:   Procedure Laterality Date    GASTRIC BYPASS      [place] LAP,DIAGNOSTIC ABDOMEN N/A 1/20/2019    Procedure: LAPAROSCOPY DIAGNOSTIC CONVERTED TO OPEN; SMALL BOWEL RESECTION; LYSIS OF ADHESIONS;  Surgeon: Ron Gillis MD;  Location: [facility];  Service: General        01/21/19 1315   Note Type   Note type Eval only   Pain Assessment   Pain Assessment 0-10   Pain Score 6   Pain Type Surgical pain   Pain Location Abdomen; Incision   Multiple Pain Sites Yes   Pain 2   Pain Score 2 6   Pain Type 2 Acute pain   Pain Location 2 Shoulder   Pain Orientation 2 Right   Pain Intervention(s) 2 (notified RN of pt's pain report)   Home Living   Type of [address] [place] [address] One level; Other (Comment); Performs ADLs on one level; Able to live on main level with bedroom/bathroom  (0 CLAUDY)   Bathroom Shower/Tub Walk-in shower   Bathroom Toilet Standard   Bathroom Equipment (no DME at baseline)   [address] (no AD at baseline)   Prior Function   Level of [place] Independent with ADLs and functional mobility   Lives With Spouse  (and mother)   Receives Help From Family   ADL Assistance Independent   IADLs Independent   Falls in the last 6 months 0   Vocational Part time employment  (nurse)   Comments (+) driving   Restrictions/Precautions   Weight Bearing Precautions Per Order No   Braces or Orthoses (none per pt)   Other Precautions Telemetry;Multiple lines; Fall Risk;Pain   General   Family/Caregiver Present Yes   Cognition Overall Cognitive Status WFL   Arousal/Participation Alert   Orientation Level Oriented X4   Memory Within functional limits   Following Commands Follows all commands and directions without difficulty   Comments pt agreeable to PT evaluation   RUE Assessment   RUE Assessment WFL   LUE Assessment   LUE Assessment WFL   RLE Assessment   RLE Assessment WFL   LLE Assessment   LLE Assessment WFL   Coordination   Movements are Fluid and Coordinated 1   Sensation WFL   Light Touch   RLE Light Touch Grossly intact   LLE Light Touch Grossly intact   Bed Mobility   Additional Comments pt received seated OOB in recliner upon arrival   Transfers   Sit to Stand 5  Supervision   Additional items Assist x 1; Increased time required;Verbal cues   Stand to Sit 5  Supervision   Additional items Assist x 1; Increased time required;Verbal cues   Ambulation/Elevation   Gait pattern Decreased foot clearance; Short stride   Gait Assistance 5  Supervision   Additional items Assist x 1   Assistive Device None   Distance 30', did not test for distance   Stair Management Assistance Not tested   Balance   Static Sitting Normal   Dynamic Sitting Good   Static Standing Fair +   Dynamic Standing Fair +   Ambulatory Fair   Endurance Deficit   Endurance Deficit Yes   Activity Tolerance   Activity Tolerance Patient limited by fatigue   Medical Staff Kami Cabrera critical care verbalized pt appropriate to see   Nurse Made Aware RN Davonte Reyes verbalized pt appropriate to see, made aware of session outcome/recs   Assessment   Prognosis Good   Problem List Decreased strength;Decreased endurance; Impaired balance;Decreased mobility;Pain   Assessment Pt is 62 y o  female seen for PT evaluation on 1/21/2019 s/p admit to [facility] on 1/20/2019 w/ Ischemic necrosis of small bowel ([facility] [address] )  Pt presents with 1 day h/o L upper and B lower quadrant abdominal pain  ED revealed cholelithiasis and possible internal hernia on CT   Patient underwent exploratory laparoscopic converted to open laparotomy for lysis of adhesions and resection of small bowel for necrosis related to an internal hernia  PT consulted to assess pt's functional mobility and d/c needs  Order placed for PT eval and tx, w/ up w/ A order  Performed at least 2 patient identifiers during session: Name and wristband  Comorbidities affecting pt's physical performance at time of assessment include: disease of thyroid gland, HTN  PTA, pt was independent w/ all functional mobility w/ no AD/DME, ambulates unrestricted distances and all terrain, has 0 CLAUDY, lives w/  and mother in 1 level home and works part time  Personal factors affecting pt at time of IE include: inability to navigate community distances, pain  Please find objective findings from PT assessment regarding body systems outlined above with impairments and limitations including weakness, impaired balance, decreased endurance, gait deviations and pain, as well as mobility assessment (need for SBA assist w/ all phases of mobility when usually ambulating independently and need for cueing for mobility technique)  The following objective measures performed on IE also reveal limitations: Barthel Index: 50/100 and Modified [place]: 3 (moderate disability)  Pt's clinical presentation is currently unstable/unpredictable seen in pt's presentation of need for input for task focus and mobility technique, abdominal pain impacting overall mobility status, on telemetry monitoring and ongoing medical assessment  Pt to benefit from continued PT tx to address deficits as defined above and maximize level of functional independent mobility and consistency  From PT/mobility standpoint, recommendation at time of d/c would be anticipate no needs pending progress in order to facilitate return to PLOF     Barriers to Discharge None   Goals   Patient Goals to return home   STG Expiration Date 01/31/19   Short Term Goal #1 In 7-10 days: Increase bilateral LE strength 1/2 grade to facilitate independent mobility, Perform all bed mobility tasks modified independent to decrease caregiver burden, Perform all transfers modified independent to improve independence, Ambulate > 150 ft  with least restrictive assistive device modified independent w/o LOB and w/ normalized gait pattern 100% of the time, Increase all balance 1/2 grade to decrease risk for falls, Complete exercise program independently, Tolerate 4 hr OOB to faciliate upright tolerance, Improve Barthel Index score to 65 or greater to facilitate independence and PT provider will perform functional balance assessment to determine fall risk   Treatment Day 0   Plan   Treatment/Interventions Functional transfer training;LE strengthening/ROM; Elevations; Therapeutic exercise; Endurance training;Patient/family training;Equipment eval/education; Bed mobility;Gait training;Spoke to nursing;Spoke to advanced practitioner;Family   PT Frequency (3-5x/wk)   Recommendation   Recommendation Home with family support  (further PT needs TBD pending progress, none anticipated)   Equipment Recommended (none anticipated)   PT - OK to Discharge No   Modified Theodore Scale   Modified Theodore Scale 3   Barthel Index   Feeding 10   Bathing 0   Grooming Score 0   Dressing Score 5   Bladder Score 10   Bowels Score 10   Toilet Use Score 5   Transfers (Bed/Chair) Score 10   Mobility (Level Surface) Score 0   Stairs Score 0   Barthel Index Score 50         Vernon Haroon, PT, DPT

## 2019-01-21 NOTE — CONSULTS
Consultation - Bariatric Surgery   Kaden Lama 62 y o  female MRN: [id number]  Unit/Bed#: ED 15 Encounter: [id number]    Assessment/Plan     Assessment:  Abdominal pain s/p RYGB with questionable internal hernia on CT    Plan:  Keep NPO  IV Fluids and pain control  OR for dx  Lap to r/o internal hernia    Case discussed with Dr Lawyer Jennings    History of Present Illness     HPI:  Kaden Lama is a 62 y o  female Body mass index is 35 95 kg/m²  with hx  Of RYGB with Dr Ronnie Metzger in 2015 with 100+lbs weight loss who presents to the ED with c/o of sudden onset of lower abdominal pain around 6pm today  This pain migrated upwards to midepigastric area and now radiates to her back  She last ate chili around 3pm and thought the pain was gas, but it did not resolve  Pain was 10/10 until she started getting IV dilaudid in the ED  She has never felt this pain before  She denies fevers, chills, sweats, SOB, CP, N/V, dark/tarry stools, blood in her stool  She had a normal BM this morning  Consults    Review of Systems   Constitutional: Negative for chills and fever  HENT: Negative for trouble swallowing  Respiratory: Negative for cough and shortness of breath  Cardiovascular: Negative for chest pain and palpitations  Gastrointestinal: Positive for abdominal pain  Negative for blood in stool, constipation, diarrhea, nausea and vomiting  Musculoskeletal: Positive for arthralgias  Neurological: Negative for dizziness  Psychiatric/Behavioral:        Denies anxiety and depression       Historical Information   Past Medical History:   Diagnosis Date    Disease of thyroid gland     Hypertension      Past Surgical History:   Procedure Laterality Date    GASTRIC BYPASS     Hx   Of C section     Social History   History   Alcohol Use No     History   Drug Use No     History   Smoking Status    Never Smoker   Smokeless Tobacco    Never Used     Family History: non-contributory    Meds/Allergies   all current active meds have been reviewed  No Known Allergies    Objective   First Vitals:   Blood Pressure: 168/77 (01/20/19 1919)  Pulse: 57 (01/20/19 1919)  Temperature: 98 °F (36 7 °C) (01/20/19 1919)  Temp Source: Oral (01/20/19 1919)  Respirations: 22 (01/20/19 1919)  Height: 5' 5" (165 1 cm) (01/20/19 1919)  Weight - Scale: 98 kg (216 lb 0 8 oz) (01/20/19 1919)  SpO2: 98 % (01/20/19 1919)    Current Vitals:   Blood Pressure: 157/80 (01/20/19 2145)  Pulse: 71 (01/20/19 2304)  Temperature: 98 °F (36 7 °C) (01/20/19 1919)  Temp Source: Oral (01/20/19 1919)  Respirations: 16 (01/20/19 2304)  Height: 5' 5" (165 1 cm) (01/20/19 1919)  Weight - Scale: 98 kg (216 lb 0 8 oz) (01/20/19 1919)  SpO2: 99 % (01/20/19 2304)    No intake or output data in the 24 hours ending 01/20/19 2312    Invasive Devices          No matching active lines, drains, or airways          Physical Exam   Constitutional: She is oriented to person, place, and time  She appears well-developed and well-nourished  She appears distressed  HENT:   Head: Normocephalic and atraumatic  Eyes: Pupils are equal, round, and reactive to light  No scleral icterus  Cardiovascular: Normal rate, regular rhythm and normal heart sounds  Pulmonary/Chest: Effort normal and breath sounds normal  No respiratory distress  Abdominal: Soft  Bowel sounds are normal  She exhibits no distension and no mass  There is tenderness (mildly tender supra-pubic area)  There is no rebound and no guarding  No incisional hernias appreciated   Musculoskeletal: She exhibits no edema  Neurological: She is alert and oriented to person, place, and time  Skin: Skin is warm and dry  Psychiatric: She has a normal mood and affect  Nursing note and vitals reviewed  Lab Results:   I have personally reviewed pertinent lab results    , CBC:   Lab Results   Component Value Date    WBC 17 97 (H) 01/20/2019    HGB 12 6 01/20/2019    HCT 38 7 01/20/2019    MCV 87 01/20/2019     01/20/2019    MCH 28 3 01/20/2019    MCHC 32 6 01/20/2019    RDW 13 1 01/20/2019    MPV 10 3 01/20/2019    NRBC 0 01/20/2019   , CMP:   Lab Results   Component Value Date    SODIUM 141 01/20/2019    K 3 6 01/20/2019     01/20/2019    CO2 26 01/20/2019    BUN 25 01/20/2019    CREATININE 0 79 01/20/2019    CALCIUM 9 0 01/20/2019    AST 19 01/20/2019    ALT 26 01/20/2019    ALKPHOS 93 01/20/2019    EGFR 83 01/20/2019     Imaging: I have personally reviewed pertinent reports  and I have personally reviewed pertinent films in PACS     CT 01/20/19:   Impression:     1   Clustered mild to moderately dilated small bowel loops noted in the lower abdomen/pelvis with wall thickening and associated mesenteric venous congestion   Findings concerning for internal hernia  2   Cholelithiasis  Verta Mellisa is either mild pericholecystic fluid versus gallbladder wall thickening towards the fundus   Findings are non-specific   This could be further evaluated with ultrasound  EKG, Pathology, and Other Studies: I have personally reviewed pertinent reports  and I have personally reviewed pertinent films in PACS    Counseling / Coordination of Care  Total floor / unit time spent today 30 minutes  Greater than 50% of total time was spent with the patient and / or family counseling and / or coordination of care

## 2019-01-21 NOTE — ED NOTES
Patient placed on 2L of O2, pt SPO2 post morphine on RA was 88% on 2L 95%     Lynn Stoll RN  01/20/19 2007

## 2019-01-21 NOTE — H&P
History and Physical - Critical Care   Adry Durand 62 y o  female MRN: [id number]  Unit/Bed#:  Encounter: [id number]    Reason for Admission / Chief Complaint:  Abdominal pain    History of Present Illness:  Adry Durand is a 62 y o  female past medical history significant for gastric bypass surgery, hypertension, hypothyroidism who presents with a one-day history of left upper and bilateral lower quadrant abdominal pain  Denied any aggravating or alleviating symptoms  Associated mild nausea  Emergency room evaluation revealed cholelithiasis and possible internal hernia on CT  Bariatric surgery was consulted due to her history of gastric bypass  Patient underwent exploratory laparoscopic converted to open laparotomy for lysis of adhesions and resection of small bowel for necrosis related to an internal hernia  Critical care is admitting Ms Hirsch to step-down postoperatively    History obtained from chart review and the patient  Past Medical History:  Past Medical History:   Diagnosis Date    Disease of thyroid gland     Hypertension        Past Surgical History:  Past Surgical History:   Procedure Laterality Date    GASTRIC BYPASS         Past Family History:  Family History   Problem Relation Age of Onset    Hypertension Mother     COPD Father        Social History:  History   Smoking Status    Never Smoker   Smokeless Tobacco    Never Used     History   Alcohol Use No     History   Drug Use No     Marital Status: /Civil Union      Medications:  Current Facility-Administered Medications   Medication Dose Route Frequency    sodium chloride 0 9 % infusion  75 mL/hr Intravenous Continuous     Home medications:  Prior to Admission medications    Not on File     Allergies:  No Known Allergies    ROS:   Review of Systems   Constitutional: Negative  Negative for chills and fever  HENT: Negative  Eyes: Negative  Respiratory: Negative  Cardiovascular: Negative  Gastrointestinal: Positive for abdominal pain and nausea  Negative for abdominal distention and vomiting  Endocrine: Negative  Genitourinary: Negative  Negative for difficulty urinating and dysuria  Musculoskeletal: Negative  Skin: Negative  Allergic/Immunologic: Negative  Neurological: Negative  Hematological: Negative  Psychiatric/Behavioral: Negative  Vitals:  Blood pressure 141/63, pulse 86, temperature 98 1 °F (36 7 °C), resp  rate (!) 26, height 5' 5" (1 651 m), weight 98 kg (216 lb 0 8 oz), SpO2 100 %  Temperature:   Temp (24hrs), Av 1 °F (36 7 °C), Min:98 °F (36 7 °C), Max:98 1 °F (36 7 °C)    Current Temperature: 98 1 °F (36 7 °C)    Weights:   IBW: 57 kg  Body mass index is 35 95 kg/m²  Hemodynamic Monitoring:     Non-Invasive/Invasive Ventilation Settings:  Respiratory    Lab Data (Last 4 hours)    None         O2/Vent Data (Last 4 hours)    None              No results found for: PHART, XNQ6USB, PO2ART, JYC7DWX, R0JPCWJW, BEART, SOURCE  SpO2: SpO2: 100 %     Physical Exam:   Physical Exam   Constitutional: She is oriented to person, place, and time  She appears well-developed and well-nourished  No distress  HENT:   Head: Normocephalic and atraumatic  Nose: Nose normal    Mouth/Throat: Oropharynx is clear and moist    Eyes: Pupils are equal, round, and reactive to light  Conjunctivae and EOM are normal  No scleral icterus  Neck: Normal range of motion  Neck supple  No JVD present  No tracheal deviation present  Cardiovascular: Normal rate, regular rhythm, normal heart sounds and intact distal pulses  Exam reveals no gallop and no friction rub  No murmur heard  Pulmonary/Chest: Effort normal and breath sounds normal    Abdominal: Soft  She exhibits no distension  Bowel sounds are absent  There is generalized tenderness  There is no rigidity, no rebound and no guarding  Musculoskeletal: Normal range of motion   She exhibits no edema, tenderness or deformity  Neurological: She is alert and oriented to person, place, and time  She has normal strength  No cranial nerve deficit or sensory deficit  GCS eye subscore is 4  GCS verbal subscore is 5  GCS motor subscore is 6  Skin: Skin is warm and dry  No rash noted  No pallor  Psychiatric: She has a normal mood and affect   Her behavior is normal        Labs:  Lab Results   Component Value Date    WBC 17 97 (H) 01/20/2019    HGB 12 6 01/20/2019    HCT 38 7 01/20/2019    MCV 87 01/20/2019     01/20/2019     Lab Results   Component Value Date    GLUCOSE 84 10/05/2015    CALCIUM 9 0 01/20/2019     10/05/2015    K 3 6 01/20/2019    CO2 26 01/20/2019     01/20/2019    BUN 25 01/20/2019    CREATININE 0 79 01/20/2019     Lab Results   Component Value Date    CALCIUM 9 0 01/20/2019     Lab Results   Component Value Date     10/05/2015    K 3 6 01/20/2019     01/20/2019    CO2 26 01/20/2019    ANIONGAP 9 10/05/2015    BUN 25 01/20/2019    CREATININE 0 79 01/20/2019    GLUCOSE 84 10/05/2015    CALCIUM 9 0 01/20/2019    AST 19 01/20/2019    ALT 26 01/20/2019    ALKPHOS 93 01/20/2019    PROT 7 4 10/05/2015    BILITOT 0 34 10/05/2015    EGFR 83 01/20/2019     Lab Results   Component Value Date    INR 1 01 01/20/2019    PROTIME 13 2 01/20/2019     Lab Results   Component Value Date    ALT 26 01/20/2019    AST 19 01/20/2019    ALKPHOS 93 01/20/2019    BILITOT 0 34 10/05/2015     Lab Results   Component Value Date    NLP4QMLWIDOA 3 660 06/28/2016       Lab Results   Component Value Date    HGBA1C 5 4 07/18/2018         Imaging:  None today  EKG:  Sinus rhythm  Micro:  No results found for: [employer]chant    ______________________________________________________________________    Assessment:   Patient Active Problem List   Diagnosis    Ischemic necrosis of small bowel ([address] )    Internal hernia    Hypertension    Disease of thyroid gland           Plan:      Neuro: Encourage good sleep hygiene  Monitor for delirium  Morphine for pain  CV:  Hemodynamically stable at present continue to monitor  Holding p o  Antihypertensives for now  Pulm:  Early mobilization  Out of bed to chair  Incentive spirometry to prevent atelectasis  Encourage cough and deep breathe  GI:  Protonix for GI protection  Surgery monitoring abdominal surgical sites  Zofran for postop nausea vomiting  :  Continue to monitor BUN, creatinine, urinary output  Monitor for DULCE  F/E/N:  IV fluids for hydration  Continue to monitor and replete electrolytes as needed  NPO for now  ID:  Will start Zosyn for intra-abdominal coverage  Check procalcitonin, may discontinue if negative  Continue to monitor her fever and leukocyte curves  Heme:  Continue monitor hemoglobin and platelet counts  No active signs of bleeding  Endo:  Monitor glucoses     Msk/Skin:  Out of bed to chair  Early mobilization  Turn and reposition q 2 hours  Meticulous skin care  Disposition:  Admit to step-down level of care for close observation  Counseling / Coordination of Care  Total time spent today 32 minutes  Greater than 50% of total time was spent with the patient and / or family counseling and / or coordination of care  A description of the counseling / coordination of care:      ______________________________________________________________________    VTE Pharmacologic Prophylaxis: deferred until cleared by surgery  VTE Mechanical Prophylaxis: sequential compression device    Invasive lines and devices: Invasive Devices     Peripheral Intravenous Line            Peripheral IV 01/20/19 Right;Ventral (anterior) Forearm less than 1 day          Drain            Urethral Catheter Latex 16 Fr  less than 1 day                Code Status: Level 1 - Full Code  POA:    POLST:      Given critical illness, patient length of stay will require greater than two midnights      Portions of the record may have been created with voice recognition software  Occasional wrong word or "sound a like" substitutions may have occurred due to the inherent limitations of voice recognition software  Read the chart carefully and recognize, using context, where substitutions have occurred        FINESSE Nunes

## 2019-01-21 NOTE — PLAN OF CARE
Problem: DISCHARGE PLANNING - CARE MANAGEMENT  Goal: Discharge to post-acute care or home with appropriate resources  INTERVENTIONS:  - Conduct assessment to determine patient/family and health care team treatment goals, and need for post-acute services based on payer coverage, community resources, and patient preferences, and barriers to discharge  - Address psychosocial, clinical, and financial barriers to discharge as identified in assessment in conjunction with the patient/family and health care team  - Arrange appropriate level of post-acute services according to patients   needs and preference and payer coverage in collaboration with the physician and health care team  - Communicate with and update the patient/family, physician, and health care team regarding progress on the discharge plan  - Arrange appropriate transportation to post-acute venues  Outcome: Progressing  Cm met with pt,  Rashaun Cintron, and mother Mery Michaud at bedside  Pt lives with her  and mother in a one story house with no CLAUDY  Pt has no problem navigating steps and is independent with ADL's  She uses no DME's  She works full time for the hospital and drives  Denies substance abuse or mental health issues  She quit smoking in 1991  She uses [employer] for her PCP needs  She uses [facility] and has no problem with her co-pays  She does not have a POA or Advanced Directive, but it is at the 's office awaiting signatures   will transport home when she is medically cleared  CM discussed d/c needs including HH, but pt plans on leaving for a cruise to the [facility] on Saturday  CM will follow through hospitalization      CM reviewed discharge planning process including the following: identifying help at home, patient preference for discharge planning needs, pharmacy preference, and availability of treatment team to discuss questions or concerns patient and/or family may have regarding understanding medications and recognizing signs and symptoms once discharged  CM also encouraged patient to follow up with all recommended appointments after discharge  Patient advised of importance for patient and family to participate in managing patients medical well being  CM name and role reviewed  Discharge Checklist reviewed and CM will continue to monitor for progress toward discharge goals in nursing and provider rounds

## 2019-01-21 NOTE — ANESTHESIA POSTPROCEDURE EVALUATION
Post-Op Assessment Note      CV Status:  Stable    Mental Status:  Alert and awake    Hydration Status:  Euvolemic    PONV Controlled:  Controlled    Airway Patency:  Patent    Post Op Vitals Reviewed: Yes          Staff: PREM           BP (!) 125/101 (01/21/19 0209)    Temp 98 1 °F (36 7 °C) (01/21/19 0209)    Pulse 90 (01/21/19 0209)   Resp 14 (01/21/19 0209)    SpO2 100 % (01/21/19 0209)

## 2019-01-21 NOTE — OP NOTE
OPERATIVE REPORT  PATIENT NAME: Remigio Womack    :  1960  MRN: [id number]  Pt Location: [facility] OR ROOM 04    SURGERY DATE: 2019 - 2019    Surgeon(s) and Role:     * Michael Agee MD - Primary     * Jayme Noonan PA-C - Assisting    Preop Diagnosis:  Internal hernia    Postop Diagnosis:  Pelvic adhesions; Necrotic small bowel      Procedure(s) (LRB):  LAPAROSCOPY DIAGNOSTIC CONVERTED TO OPEN; SMALL BOWEL RESECTION; LYSIS OF ADHESIONS (N/A)    Specimen(s):  ID Type Source Tests Collected by Time Destination   1 : PORTION OF SMALL BOWEL Tissue Small Bowel, NOS TISSUE EXAM Michael Agee MD 2019 [id number]        Estimated Blood Loss:   50 mL    Drains: none       Anesthesia Type:   General    Operative Indications:  61yo F s/p LRYGB   Acute abdominal pain with a CTAP with possible internal hernia with dilated pelvic loops of small bowel    Operative Findings:  80cm necrotic small intestine from torsion at pelvic adhesion; chronic fat containing umbilical hernia    Complications:   None    Procedure and Technique:  The patient was brought into the operating room and placed onto the operating room table supine and identified verbally and by armband  Sequential compression devices were placed bilaterally  General endotracheal anesthesia was induced  The patient was prepped and draped in the usual sterile fashion  A multidisciplinary time out was performed and agreed upon by everyone in the room  Using veress needle technique at [place], insufflation of 15mmHg was achieved in the peritoneal cavity  The patient tolerated this well  Using the optiview technique with a 12mm trocar at this site, the abdomen was entered under direct visualization of all of the layers of the abdominal wall  Upon entering the abdomen, attention was brought to the pelvis where loops of small intestine were dilated and necrotic   I also a fat containing umbilical hernia which was no consequence to the bowel in the pelvis  The remaining abdomen was unremarkable  A 5mm trocar was placed in the LLQ to attempt mobilization of the bowel  I was unable to free the bowel from the pelvis as it was tethered at its mesentery deep in the pelvis  At this point, my decision was made to convert to an exploratory laparotomy  Using a scalpel blade, a lower midline incision was created through the skin  The remaining layers and peritoneum were entered using Bovie cautery while the peritoneal cavity was still insufflated which allowed avoidance of injury to abdominal contents  Hemostasis was achieved  I noted the base of the mesentery was twisted at the point of the ischemic small bowel and had great difficulty untwisting this portion  I ran the bowel retrograde from the cecum and terminal ileum  This allowed me to free the tethered small bowel from the adhesions  I continued to run the bowel to the point of the previous jejunojejunostomy which was normal in appearance and the JJ mesenteric defect was closed  I ran the BP limb to the ligament of Treitz and the sanjeev limb to the pouch  Using a 60mm white load x2, the necrotic bowel was transected proximally and distally  I transected through healthy, viable portions of bowel at each end  The mesentery was then divided using harmonic scalpel  Hemostasis was achieved  The length of resected small bowel was approximately 80cm  I measured the common channel to now be approximately 135cm from [address] to the cecum  The proximal and distal ends were reanastomosed in a side to side functional end to end fashion using a white load with the stapler  The mesenteric defect was closed in the running fashion using a 2-0 silk suture  I re-ran the small bowel to ensure proper orientation which is was  The abdomen was irrigated using wash irrigation  Hemostasis was achieved  The fascia was then closed using two looped #1PDS suture in the running fashion from both cephalad and caudal ends   Care was taken to avoid injury to bowel and to avoid the umbilical hernia which was chronic and not necessitating repair at this emergent case  The subcutaneous fat, tissue and skin edges were irrigated and skin closed with staples  The trocar sites were also closed using staples  All counts were correct x2       I was present for the entire procedure, A qualified resident physician was not available and A physician assistant was required during the procedure for retraction tissue handling,dissection and suturing    Patient Disposition:  PACU  and hemodynamically stable    SIGNATURE: Kelvin Wilburn MD  DATE: January 21, 2019  TIME: 2:01 AM

## 2019-01-21 NOTE — ED NOTES
Assisted pt to and from bathroom  Pt ambulated without difficulty       Chapo Willingham RN  01/20/19 8632

## 2019-01-21 NOTE — ED NOTES
Patient states pain medication administered helped with abdominal pain not with back pain, repositioned patient for comfort        David Patel RN  01/20/19 2012

## 2019-01-21 NOTE — ED PROVIDER NOTES
History  Chief Complaint   Patient presents with    Abdominal Pain     Patient presents to ED with sudden onset of generalized abdominal pain  States it happened about 30 minutes after eating chili  Denies any n/v/d     70-year-old female Patient presents emergency department for evaluation of abdominal pain  Patient initially states that she was having some sort of a left-sided flank pain that felt like gas and progressively worsened and is now mobile throughout her abdomen  The patient is tender in the left upper quadrant as well as the epigastric area but she is not tender in the lower quadrants of her abdomen at all  The patient states nothing makes this pain better, nothing makes pain worse, it is associated with mild nausea  This did start immediately after the patient was eating chili  Patient still has all of her internal organs stating no abdominal surgeries  Differential diagnosis for the patient includes was not limited to renal colic, renal calculi, small bowel obstruction, intra-abdominal neoplasm          History provided by:  Patient   used: No    Abdominal Pain   Pain location:  LUQ and epigastric  Pain quality: aching, sharp, shooting and stabbing    Pain radiates to:  Does not radiate  Pain severity:  Mild  Onset quality:  Gradual  Timing:  Constant  Progression:  Worsening  Context: not alcohol use, not eating, not previous surgeries, not recent sexual activity and not suspicious food intake    Relieved by:  Nothing  Worsened by:  Nothing  Ineffective treatments:  None tried  Associated symptoms: no anorexia, no belching and no fatigue        None       Past Medical History:   Diagnosis Date    Disease of thyroid gland     Hypertension        Past Surgical History:   Procedure Laterality Date    GASTRIC BYPASS         Family History   Problem Relation Age of Onset    Hypertension Mother     COPD Father      I have reviewed and agree with the history as documented  Social History   Substance Use Topics    Smoking status: Never Smoker    Smokeless tobacco: Never Used    Alcohol use No        Review of Systems   Constitutional: Negative for fatigue  Gastrointestinal: Positive for abdominal pain  Negative for anorexia  All other systems reviewed and are negative  Physical Exam  Physical Exam   Constitutional: She is oriented to person, place, and time  She appears well-developed and well-nourished  HENT:   Head: Normocephalic and atraumatic  Right Ear: External ear normal    Left Ear: External ear normal    Eyes: Conjunctivae and EOM are normal    Neck: No JVD present  No tracheal deviation present  No thyromegaly present  Cardiovascular: Normal rate  Pulmonary/Chest: Effort normal and breath sounds normal  No stridor  Abdominal: Soft  She exhibits no distension and no mass  There is no tenderness  There is no guarding  No hernia  Musculoskeletal: Normal range of motion  She exhibits no edema, tenderness or deformity  Lymphadenopathy:     She has no cervical adenopathy  Neurological: She is alert and oriented to person, place, and time  Skin: Skin is warm  No rash noted  No erythema  No pallor  Psychiatric: She has a normal mood and affect  Her behavior is normal    Nursing note and vitals reviewed        Vital Signs  ED Triage Vitals [01/20/19 1919]   Temperature Pulse Respirations Blood Pressure SpO2   98 °F (36 7 °C) 57 22 168/77 98 %      Temp Source Heart Rate Source Patient Position - Orthostatic VS BP Location FiO2 (%)   Oral Monitor Lying Right arm --      Pain Score       8           Vitals:    01/20/19 1919 01/20/19 2021 01/20/19 2145 01/20/19 2304   BP: 168/77 140/63 157/80 127/75   Pulse: 57 55 67 71   Patient Position - Orthostatic VS: Lying Lying Lying Lying       Visual Acuity      ED Medications  Medications   ondansetron (ZOFRAN) injection 4 mg (4 mg Intravenous Given 1/20/19 1957)   morphine (PF) 10 mg/mL injection 6 mg (6 mg Intravenous Given 1/20/19 2001)   iohexol (OMNIPAQUE) 350 MG/ML injection (MULTI-DOSE) 100 mL (100 mL Intravenous Given 1/20/19 2046)   HYDROmorphone (DILAUDID) injection 1 mg (1 mg Intravenous Given 1/20/19 2103)   diphenhydrAMINE (BENADRYL) injection 50 mg (50 mg Intravenous Given 1/20/19 2103)   iohexol (OMNIPAQUE) 240 MG/ML solution 50 mL (50 mL Oral Given 1/20/19 2159)   sodium chloride 0 9 % bolus 1,000 mL (0 mL Intravenous Stopped 1/20/19 2321)       Diagnostic Studies  Results Reviewed     Procedure Component Value Units Date/Time    UA w Reflex to Microscopic w Reflex to Culture [[id number]] Collected:  01/20/19 2318    Lab Status: In process Specimen:  Urine from Urine, Clean Catch Updated:  01/20/19 2323    Lactic acid, plasma [[id number]]  (Normal) Collected:  01/20/19 2131    Lab Status:  Final result Specimen:  Blood from Arm, Left Updated:  01/20/19 2201     LACTIC ACID 1 2 mmol/L     Narrative:         Result may be elevated if tourniquet was used during collection  Troponin I [[id number]]  (Normal) Collected:  01/20/19 2002    Lab Status:  Final result Specimen:  Blood from Arm, Left Updated:  01/20/19 2037     Troponin I <0 02 ng/mL     Comprehensive metabolic panel [[id number]]  (Abnormal) Collected:  01/20/19 2002    Lab Status:  Final result Specimen:  Blood from Arm, Left Updated:  01/20/19 2034     Sodium 141 mmol/L      Potassium 3 6 mmol/L      Chloride 105 mmol/L      CO2 26 mmol/L      ANION GAP 10 mmol/L      BUN 25 mg/dL      Creatinine 0 79 mg/dL      Glucose 157 (H) mg/dL      Calcium 9 0 mg/dL      AST 19 U/L      ALT 26 U/L      Alkaline Phosphatase 93 U/L      Total Protein 7 7 g/dL      Albumin 3 7 g/dL      Total Bilirubin 0 20 mg/dL      eGFR 83 ml/min/1 73sq m     Narrative:         National Kidney Disease Education Program recommendations are as follows:  GFR calculation is accurate only with a steady state creatinine  Chronic Kidney disease less than 60 ml/min/1 73 sq  meters  Kidney failure less than 15 ml/min/1 73 sq  meters  Protime-INR [[id number]]  (Normal) Collected:  01/20/19 2002    Lab Status:  Final result Specimen:  Blood from Arm, Left Updated:  01/20/19 2028     Protime 13 2 seconds      INR 1 01    CBC and differential [[id number]]  (Abnormal) Collected:  01/20/19 2002    Lab Status:  Final result Specimen:  Blood from Arm, Left Updated:  01/20/19 2017     WBC 17 97 (H) Thousand/uL      RBC 4 45 Million/uL      Hemoglobin 12 6 g/dL      Hematocrit 38 7 %      MCV 87 fL      MCH 28 3 pg      MCHC 32 6 g/dL      RDW 13 1 %      MPV 10 3 fL      Platelets 549 Thousands/uL      nRBC 0 /100 WBCs      Neutrophils Relative 84 (H) %      Immat GRANS % 1 %      Lymphocytes Relative 11 (L) %      Monocytes Relative 3 (L) %      Eosinophils Relative 1 %      Basophils Relative 0 %      Neutrophils Absolute 15 11 (H) Thousands/µL      Immature Grans Absolute 0 09 Thousand/uL      Lymphocytes Absolute 2 01 Thousands/µL      Monocytes Absolute 0 60 Thousand/µL      Eosinophils Absolute 0 11 Thousand/µL      Basophils Absolute 0 05 Thousands/µL                  CT abdomen pelvis with contrast   Final Result by Maria Vasquez MD (01/20 2114)   1  Clustered mild to moderately dilated small bowel loops noted in the lower abdomen/pelvis with wall thickening and associated mesenteric venous congestion  Findings concerning for internal hernia  2   Cholelithiasis  There is either mild pericholecystic fluid versus gallbladder wall thickening towards the fundus  Findings are non-specific  This could be further evaluated with ultrasound         I personally discussed this study with Brenda Golden on 1/20/2019 at 9:12 PM                Workstation performed: [id number]         CT abdomen pelvis with contrast    (Results Pending)              Procedures  Procedures       Phone Contacts  ED Phone Contact    ED Course  ED Course as of Jan 20 9975   Ravi Layer Jan 20, 2019   2747 I spoke with bariatrics and they asked for repeat of the CT with oral contrast   This will be done and the patient will likely require transfer to [facility] for surgery  MDM  Number of Diagnoses or Management Options  Internal hernia: new and requires workup     Amount and/or Complexity of Data Reviewed  Clinical lab tests: ordered and reviewed  Tests in the radiology section of CPT®: ordered and reviewed  Decide to obtain previous medical records or to obtain history from someone other than the patient: yes  Review and summarize past medical records: yes    Patient Progress  Patient progress: stable    The patient presented with a condition in which there was a high probability of imminent or life-threatening deterioration, and critical care services (excluding separately billable procedures) totalled 30-74 minutes (straight to OR for ex-lap, internal hernia  30 minutes  )  Disposition  Final diagnoses:   Internal hernia     Time reflects when diagnosis was documented in both MDM as applicable and the Disposition within this note     Time User Action Codes Description Comment    1/20/2019 10:23 PM Arthur Messer Add [K45 8] Internal hernia       ED Disposition     ED Disposition Condition Comment    Send to OR  For ex lap        Follow-up Information    None         Patient's Medications   Discharge Prescriptions    No medications on file     No discharge procedures on file      ED Provider  Electronically Signed by           Joyce Ngo DO  01/20/19 9409

## 2019-01-21 NOTE — ANESTHESIA PREPROCEDURE EVALUATION
Review of Systems/Medical History  Patient summary reviewed  Chart reviewed  No history of anesthetic complications     Cardiovascular  EKG reviewed, Hypertension ,    Pulmonary  Negative pulmonary ROS Not a smoker ,        GI/Hepatic  Negative GI/hepatic ROS     Comment: Gastric bypass (2015)     Negative  ROS        Endo/Other  Negative endo/other ROS History of thyroid disease ,      GYN  Negative gynecology ROS          Hematology  Negative hematology ROS      Musculoskeletal  Negative musculoskeletal ROS        Neurology  Negative neurology ROS      Psychology   Negative psychology ROS              Physical Exam    Airway    Mallampati score: II  TM Distance: >3 FB  Neck ROM: full     Dental   No notable dental hx     Cardiovascular  Cardiovascular exam normal    Pulmonary  Pulmonary exam normal Breath sounds clear to auscultation,     Other Findings        Anesthesia Plan  ASA Score- 2     Anesthesia Type- general with ASA Monitors  Additional Monitors:   Airway Plan:         Plan Factors- Patient instructed to abstain from smoking on day of procedure       Induction- intravenous  Postoperative Plan- Plan for postoperative opioid use  Planned trial extubation    Informed Consent- Anesthetic plan and risks discussed with patient  I personally reviewed this patient with the CRNA  Discussed and agreed on the Anesthesia Plan with the CRNA             Lab Results   Component Value Date    WBC 17 97 (H) 01/20/2019    HGB 12 6 01/20/2019    HCT 38 7 01/20/2019    MCV 87 01/20/2019     01/20/2019     Lab Results   Component Value Date    GLUCOSE 84 10/05/2015    CALCIUM 9 0 01/20/2019     10/05/2015    K 3 6 01/20/2019    CO2 26 01/20/2019     01/20/2019    BUN 25 01/20/2019    CREATININE 0 79 01/20/2019     Lab Results   Component Value Date    INR 1 01 01/20/2019    PROTIME 13 2 01/20/2019     No results found for: PTT  Type and Screen:  O        I, Dr Gracie Osman, the attending physician, have personally seen and evaluated the patient prior to anesthetic care  I have reviewed the pre-anesthetic record, and other medical records if appropriate to the anesthetic care  If a CRNA is involved in the case, I have reviewed the CRNA assessment, if present, and agree  The patient is in a suitable condition to proceed with my formulated anesthetic plan

## 2019-01-21 NOTE — UTILIZATION REVIEW
Initial Clinical Review    Admission: Date/Time/Statement: 1/21/19 @ 0253 Inpatient Written   Orders Placed This Encounter   Procedures    Inpatient Admission     Standing Status:   Standing     Number of Occurrences:   1     Order Specific Question:   Admitting Physician     Answer:   Stoney Drake     Order Specific Question:   Level of Care     Answer:   Level 1 Stepdown [13]     Order Specific Question:   Estimated length of stay     Answer:   More than 2 Midnights     Order Specific Question:   Certification     Answer:   I certify that inpatient services are medically necessary for this patient for a duration of greater than two midnights  See H&P and MD Progress Notes for additional information about the patient's course of treatment  ED: Date/Time/Mode of Arrival:   ED Arrival Information     Expected Arrival Acuity Means of Arrival Escorted By Service Admission Type    - 1/20/2019 19:13 Urgent Walk-In Spouse Critical Care/ICU Urgent    Arrival Complaint    abd pain        Chief Complaint:   Chief Complaint   Patient presents with    Abdominal Pain     Patient presents to ED with sudden onset of generalized abdominal pain  States it happened about 30 minutes after eating chili  Denies any n/v/d     History of Illness: 24-year-old female Patient presents emergency department for evaluation of abdominal pain  Patient initially states that she was having some sort of a left-sided flank pain that felt like gas and progressively worsened and is now mobile throughout her abdomen  The patient is tender in the left upper quadrant as well as the epigastric area but she is not tender in the lower quadrants of her abdomen at all  The patient states nothing makes this pain better, nothing makes pain worse, it is associated with mild nausea  This did start immediately after the patient was eating chili  Patient still has all of her internal organs stating no abdominal surgeries      ED Vital Signs: ED Triage Vitals [01/20/19 1919]   Temperature Pulse Respirations Blood Pressure SpO2   98 °F (36 7 °C) 57 22 168/77 98 %      Temp Source Heart Rate Source Patient Position - Orthostatic VS BP Location FiO2 (%)   Oral Monitor Lying Right arm --      Pain Score       8        Wt Readings from Last 1 Encounters:   01/21/19 104 kg (228 lb 6 3 oz)     Vital Signs (abnormal): RESPS 26, /101  Pertinent Labs/Diagnostic Test Results: WBC 17 97, GLUCOSE 157,   Ketones, UA 15 (1+)     CT AP:  1   Clustered mild to moderately dilated small bowel loops noted in the lower abdomen/pelvis with wall thickening and associated mesenteric venous congestion  Findings concerning for internal hernia  2   Cholelithiasis  There is either mild pericholecystic fluid versus gallbladder wall thickening towards the fundus  Findings are non-specific  This could be further evaluated with ultrasound  EKG:    ED Treatment:   Medication Administration from 01/20/2019 1913 to 01/20/2019 2341       Date/Time Order Dose Route Action     01/20/2019 1957 ondansetron (ZOFRAN) injection 4 mg 4 mg Intravenous Given     01/20/2019 2001 morphine (PF) 10 mg/mL injection 6 mg 6 mg Intravenous Given     01/20/2019 2046 iohexol (OMNIPAQUE) 350 MG/ML injection (MULTI-DOSE) 100 mL 100 mL Intravenous Given     01/20/2019 2103 HYDROmorphone (DILAUDID) injection 1 mg 1 mg Intravenous Given     01/20/2019 2103 diphenhydrAMINE (BENADRYL) injection 50 mg 50 mg Intravenous Given     01/20/2019 2159 iohexol (OMNIPAQUE) 240 MG/ML solution 50 mL 50 mL Oral Given     01/20/2019 2231 sodium chloride 0 9 % bolus 1,000 mL 1,000 mL Intravenous New Bag        Past Medical/Surgical History:    Active Ambulatory Problems     Diagnosis Date Noted    No Active Ambulatory Problems     Resolved Ambulatory Problems     Diagnosis Date Noted    No Resolved Ambulatory Problems     Past Medical History:   Diagnosis Date    Disease of thyroid gland     Hypertension Admitting Diagnosis: Abdominal pain [R10 9]  Internal hernia [K45 8]  Age/Sex: 62 y o  female  Assessment/Plan: ADMIT INPATIENT STATUS:  Emergency room evaluation revealed cholelithiasis and possible internal hernia on CT  Bariatric surgery was consulted due to her history of gastric bypass  Patient underwent exploratory laparoscopic converted to open laparotomy for lysis of adhesions and resection of small bowel for necrosis related to an internal hernia    Critical care is admitting Ms Hirsch to step-down postoperatively  IV PAIN MEDS, LABS, IVF, IV ABX, MONITOR BS   VTE Pharmacologic Prophylaxis: deferred until cleared by surgery  VTE Mechanical Prophylaxis: sequential compression device  Given critical illness, patient length of stay will require greater than two midnights  Admission Orders:  ICU, Cardio-Pulmonary Monitoring   NPO  OOB WITH ASSIST  Encourage deep breathing and coughing, Incentive spirometry   Daily weights, I/O  PT, OT  Consult nutr serv, cm  Sequential compression device   Scheduled Meds:   Current Facility-Administered Medications:  enoxaparin 40 mg Subcutaneous Q24H [address]   morphine injection 2 mg Intravenous Q3H PRN   ondansetron 4 mg Intravenous Q6H PRN   pantoprazole 40 mg Intravenous Q12H CALLI   piperacillin-tazobactam 3 375 g Intravenous Q6H   sodium chloride 75 mL/hr Intravenous Continuous     Continuous Infusions:   sodium chloride 75 mL/hr Last Rate: 75 mL/hr (19 0312)     PRN Meds:  meperedine 12 5mg IV x2 thus far   morphine injection    ondansetron    OPERATIVE REPORT  PATIENT NAME: George Arroyo    :  2/15/ 1960  SURGERY DATE: 2019 - 2019  Preop Diagnosis:  Internal hernia  Postop Diagnosis:  Pelvic adhesions; Necrotic small bowel  Procedure(s) (LRB):  LAPAROSCOPY DIAGNOSTIC CONVERTED TO OPEN; SMALL BOWEL RESECTION; LYSIS OF ADHESIONS      [address] [facility] Review Department  Phone: [phone];  Fax [phone]  [email or web address]  ATTENTION: Please call with any questions or concerns to [phone]  and carefully listen to the prompts so that you are directed to the right person  Send all requests for admission clinical reviews, approved or denied determinations and any other requests to fax [phone]   All voicemails are confidential

## 2019-01-21 NOTE — SOCIAL WORK
Cm met with pt,  Henrry Palacio, and mother Crystal Merritt at bedside  Pt lives with her  and mother in a one story house with no CLAUDY  Pt has no problem navigating steps and is independent with ADL's  She uses no DME's  She works full time for the hospital and drives  Denies substance abuse or mental health issues  She quit smoking in 1991  She uses [employer] for her PCP needs  She uses [facility] and has no problem with her co-pays  She does not have a POA or Advanced Directive, but it is at the 's office awaiting signatures   will transport home when she is medically cleared  CM discussed d/c needs including HH, but pt plans on leaving for a cruise to the [facility] on Saturday  CM will follow through hospitalization  CM reviewed discharge planning process including the following: identifying help at home, patient preference for discharge planning needs, pharmacy preference, and availability of treatment team to discuss questions or concerns patient and/or family may have regarding understanding medications and recognizing signs and symptoms once discharged  CM also encouraged patient to follow up with all recommended appointments after discharge  Patient advised of importance for patient and family to participate in managing patients medical well being  CM name and role reviewed  Discharge Checklist reviewed and CM will continue to monitor for progress toward discharge goals in nursing and provider rounds

## 2019-01-21 NOTE — PROGRESS NOTES
Progress Note - Bariatric Surgery   Sarina Lighter 62 y o  female MRN: [id number]  Unit/Bed#:  Encounter: [id number]      Subjective/Objective     Subjective: Patient with hx  Of RYGB 2015 s/p Dx  Lap converted to open, small bowel resection, LCUY POD#1  Reports pain from last night resolved  Feels mild incisional soreness  No flatus or BM  Boyd catheter intact  NPO  Denies fevers, chills, sweats, SOB, CP, calf pain  Using incentive spirometer  Objective:    /63   Pulse 86   Temp 98 1 °F (36 7 °C)   Resp (!) 26   Ht 5' 5" (1 651 m)   Wt 104 kg (228 lb 6 3 oz)   SpO2 100%   BMI 38 01 kg/m²       Intake/Output Summary (Last 24 hours) at 01/21/19 0914  Last data filed at 01/21/19 0701   Gross per 24 hour   Intake          3086 25 ml   Output             1000 ml   Net          2086 25 ml       Invasive Devices     Peripheral Intravenous Line            Peripheral IV 01/20/19 Right;Ventral (anterior) Forearm less than 1 day    Peripheral IV 01/21/19 Left Arm less than 1 day          Drain            Urethral Catheter Latex 16 Fr  less than 1 day                ROS: 10-point system completed  All negative except see HPI  Physical Exam    General Appearance:    Alert, cooperative, no distress, appears stated age   Head:    Normocephalic, without obvious abnormality, atraumatic   Lungs:     Clear to auscultation bilaterally, respirations unlabored   Heart:    Regular rate and rhythm, S1 and S2 normal, no murmur, rub    or gallop   Abdomen:     Soft, appropriate tenderness, bowel sounds active all four quadrants, no masses, no organomegaly, non distended, incisions clean, dry, and intact  Staples intact, boyd catheter intact - light yellow urine   Extremities:   Extremities normal, atraumatic, no cyanosis or edema   Neurologic:   CNII-XII intact  Normal strength and sensation               Lab, Imaging and other studies:  I have personally reviewed pertinent lab results    , CBC:   Lab Results Component Value Date    WBC 16 73 (H) 01/21/2019    HGB 11 0 (L) 01/21/2019    HCT 34 5 (L) 01/21/2019    MCV 88 01/21/2019     01/21/2019    MCH 28 1 01/21/2019    MCHC 31 9 01/21/2019    RDW 13 2 01/21/2019    MPV 9 9 01/21/2019    NRBC 0 01/21/2019   , CMP:   Lab Results   Component Value Date    SODIUM 141 01/21/2019    K 4 0 01/21/2019     01/21/2019    CO2 26 01/21/2019    BUN 19 01/21/2019    CREATININE 0 80 01/21/2019    CALCIUM 8 4 01/21/2019    AST 24 01/21/2019    ALT 21 01/21/2019    ALKPHOS 80 01/21/2019    EGFR 82 01/21/2019   , Coagulation:   Lab Results   Component Value Date    INR 1 18 (H) 01/21/2019        VTE Mechanical Prophylaxis: sequential compression device, lovenox    Assessment/Plan  1)  57yo F c Hx  Of RYGB in 2015 S/p Dx  Lap converted to open, small bowel resection, LUCY POD#1 with stable post op course  Severe pain resolved, feeling mild incisional soreness  WBC slightly trending down - on Zosyn  NPO, on IVF  -Continue NPO, but may have sips of clears with meds  Will hold off on diet until passing flatus  She is likely to have post operative ileus    -Continue IVF (currently running at 75cc/hr)  -Continue zosyn and trend WBC  -Encourage incentive spirometry and ambulation  -Surgical team will continue to monitor    Plan of care was discussed with patient and patient's nurse  Care plan discussed with Dr Madyson Aviles PA-C  1/21/2019  9:35 AM

## 2019-01-21 NOTE — ED NOTES
Patient transferred LILIANA to OR by Mitch Briones [id number], 3[address] [facility]  01/20/19 7997

## 2019-01-22 PROBLEM — D72.829 LEUKOCYTOSIS: Status: ACTIVE | Noted: 2019-01-22

## 2019-01-22 PROBLEM — D50.9 IRON DEFICIENCY ANEMIA: Status: ACTIVE | Noted: 2019-01-22

## 2019-01-22 LAB
ANION GAP SERPL CALCULATED.3IONS-SCNC: 6 MMOL/L (ref 4–13)
BASOPHILS # BLD AUTO: 0.03 THOUSANDS/ΜL (ref 0–0.1)
BASOPHILS NFR BLD AUTO: 0 % (ref 0–1)
BUN SERPL-MCNC: 14 MG/DL (ref 5–25)
CALCIUM SERPL-MCNC: 8.2 MG/DL (ref 8.3–10.1)
CHLORIDE SERPL-SCNC: 105 MMOL/L (ref 100–108)
CO2 SERPL-SCNC: 27 MMOL/L (ref 21–32)
CREAT SERPL-MCNC: 0.74 MG/DL (ref 0.6–1.3)
EOSINOPHIL # BLD AUTO: 0.03 THOUSAND/ΜL (ref 0–0.61)
EOSINOPHIL NFR BLD AUTO: 0 % (ref 0–6)
ERYTHROCYTE [DISTWIDTH] IN BLOOD BY AUTOMATED COUNT: 13.4 % (ref 11.6–15.1)
ERYTHROCYTE [DISTWIDTH] IN BLOOD BY AUTOMATED COUNT: 13.4 % (ref 11.6–15.1)
GFR SERPL CREATININE-BSD FRML MDRD: 90 ML/MIN/1.73SQ M
GLUCOSE SERPL-MCNC: 109 MG/DL (ref 65–140)
GLUCOSE SERPL-MCNC: 260 MG/DL (ref 65–140)
GLUCOSE SERPL-MCNC: 99 MG/DL (ref 65–140)
HCT VFR BLD AUTO: 27.8 % (ref 34.8–46.1)
HCT VFR BLD AUTO: 29.4 % (ref 34.8–46.1)
HGB BLD-MCNC: 8.9 G/DL (ref 11.5–15.4)
HGB BLD-MCNC: 9.3 G/DL (ref 11.5–15.4)
IMM GRANULOCYTES # BLD AUTO: 0.04 THOUSAND/UL (ref 0–0.2)
IMM GRANULOCYTES NFR BLD AUTO: 0 % (ref 0–2)
LYMPHOCYTES # BLD AUTO: 2.1 THOUSANDS/ΜL (ref 0.6–4.47)
LYMPHOCYTES NFR BLD AUTO: 18 % (ref 14–44)
MAGNESIUM SERPL-MCNC: 1.9 MG/DL (ref 1.6–2.6)
MCH RBC QN AUTO: 28.2 PG (ref 26.8–34.3)
MCH RBC QN AUTO: 28.4 PG (ref 26.8–34.3)
MCHC RBC AUTO-ENTMCNC: 31.6 G/DL (ref 31.4–37.4)
MCHC RBC AUTO-ENTMCNC: 32 G/DL (ref 31.4–37.4)
MCV RBC AUTO: 88 FL (ref 82–98)
MCV RBC AUTO: 90 FL (ref 82–98)
MONOCYTES # BLD AUTO: 0.75 THOUSAND/ΜL (ref 0.17–1.22)
MONOCYTES NFR BLD AUTO: 7 % (ref 4–12)
NEUTROPHILS # BLD AUTO: 8.54 THOUSANDS/ΜL (ref 1.85–7.62)
NEUTS SEG NFR BLD AUTO: 75 % (ref 43–75)
NRBC BLD AUTO-RTO: 0 /100 WBCS
PHOSPHATE SERPL-MCNC: 2.9 MG/DL (ref 2.7–4.5)
PLATELET # BLD AUTO: 205 THOUSANDS/UL (ref 149–390)
PLATELET # BLD AUTO: 238 THOUSANDS/UL (ref 149–390)
PMV BLD AUTO: 10.1 FL (ref 8.9–12.7)
PMV BLD AUTO: 9.6 FL (ref 8.9–12.7)
POTASSIUM SERPL-SCNC: 4.5 MMOL/L (ref 3.5–5.3)
RBC # BLD AUTO: 3.16 MILLION/UL (ref 3.81–5.12)
RBC # BLD AUTO: 3.27 MILLION/UL (ref 3.81–5.12)
SODIUM SERPL-SCNC: 138 MMOL/L (ref 136–145)
WBC # BLD AUTO: 11.49 THOUSAND/UL (ref 4.31–10.16)
WBC # BLD AUTO: 13.11 THOUSAND/UL (ref 4.31–10.16)

## 2019-01-22 PROCEDURE — 82948 REAGENT STRIP/BLOOD GLUCOSE: CPT

## 2019-01-22 PROCEDURE — 85025 COMPLETE CBC W/AUTO DIFF WBC: CPT | Performed by: SURGERY

## 2019-01-22 PROCEDURE — 85027 COMPLETE CBC AUTOMATED: CPT | Performed by: SURGERY

## 2019-01-22 PROCEDURE — 99242 OFF/OP CONSLTJ NEW/EST SF 20: CPT | Performed by: STUDENT IN AN ORGANIZED HEALTH CARE EDUCATION/TRAINING PROGRAM

## 2019-01-22 PROCEDURE — C9113 INJ PANTOPRAZOLE SODIUM, VIA: HCPCS | Performed by: NURSE PRACTITIONER

## 2019-01-22 PROCEDURE — 80048 BASIC METABOLIC PNL TOTAL CA: CPT | Performed by: SURGERY

## 2019-01-22 PROCEDURE — 84100 ASSAY OF PHOSPHORUS: CPT | Performed by: SURGERY

## 2019-01-22 PROCEDURE — 83735 ASSAY OF MAGNESIUM: CPT | Performed by: SURGERY

## 2019-01-22 RX ORDER — FERROUS SULFATE 325(65) MG
325 TABLET ORAL
Status: DISCONTINUED | OUTPATIENT
Start: 2019-01-22 | End: 2019-01-22

## 2019-01-22 RX ORDER — KETOROLAC TROMETHAMINE 30 MG/ML
15 INJECTION, SOLUTION INTRAMUSCULAR; INTRAVENOUS ONCE
Status: COMPLETED | OUTPATIENT
Start: 2019-01-22 | End: 2019-01-22

## 2019-01-22 RX ORDER — FERROUS SULFATE 325(65) MG
325 TABLET ORAL
Status: DISCONTINUED | OUTPATIENT
Start: 2019-01-23 | End: 2019-01-23

## 2019-01-22 RX ADMIN — OXYCODONE HYDROCHLORIDE 10 MG: 10 TABLET ORAL at 05:53

## 2019-01-22 RX ADMIN — PIPERACILLIN SODIUM,TAZOBACTAM SODIUM 3.38 G: 3; .375 INJECTION, POWDER, FOR SOLUTION INTRAVENOUS at 16:21

## 2019-01-22 RX ADMIN — PIPERACILLIN SODIUM,TAZOBACTAM SODIUM 3.38 G: 3; .375 INJECTION, POWDER, FOR SOLUTION INTRAVENOUS at 22:16

## 2019-01-22 RX ADMIN — PANTOPRAZOLE SODIUM 40 MG: 40 INJECTION, POWDER, FOR SOLUTION INTRAVENOUS at 22:16

## 2019-01-22 RX ADMIN — PIPERACILLIN SODIUM,TAZOBACTAM SODIUM 3.38 G: 3; .375 INJECTION, POWDER, FOR SOLUTION INTRAVENOUS at 03:43

## 2019-01-22 RX ADMIN — ENOXAPARIN SODIUM 40 MG: 40 INJECTION SUBCUTANEOUS at 09:48

## 2019-01-22 RX ADMIN — OXYCODONE HYDROCHLORIDE 10 MG: 10 TABLET ORAL at 17:02

## 2019-01-22 RX ADMIN — OXYCODONE HYDROCHLORIDE 10 MG: 10 TABLET ORAL at 10:01

## 2019-01-22 RX ADMIN — DEXTROSE, SODIUM CHLORIDE, AND POTASSIUM CHLORIDE 75 ML/HR: 5; .45; .15 INJECTION INTRAVENOUS at 20:46

## 2019-01-22 RX ADMIN — PIPERACILLIN SODIUM,TAZOBACTAM SODIUM 3.38 G: 3; .375 INJECTION, POWDER, FOR SOLUTION INTRAVENOUS at 09:52

## 2019-01-22 RX ADMIN — KETOROLAC TROMETHAMINE 15 MG: 30 INJECTION, SOLUTION INTRAMUSCULAR at 23:45

## 2019-01-22 RX ADMIN — PANTOPRAZOLE SODIUM 40 MG: 40 INJECTION, POWDER, FOR SOLUTION INTRAVENOUS at 09:48

## 2019-01-22 RX ADMIN — DEXTROSE, SODIUM CHLORIDE, AND POTASSIUM CHLORIDE 100 ML/HR: 5; .45; .15 INJECTION INTRAVENOUS at 06:33

## 2019-01-22 NOTE — ASSESSMENT & PLAN NOTE
Leukocytosis likely related to small bowel necrosis  Status post small bowel resection POD#2      Currently improving on Zosyn  Continue to monitor CBC

## 2019-01-22 NOTE — ASSESSMENT & PLAN NOTE
Baseline hemoglobin around 11-12 range  Previous iron studies were consistent with iron deficiency  Patient denies being on any supplements  Current hemoglobin 8 5  Patient denies hematuria, melena, hematochezia  No signs of overt bleeding noted  Hemodynamically stable  Start on iron supplement once she starts having bowel movement and on regular diet

## 2019-01-22 NOTE — PROGRESS NOTES
Progress Note - Bariatric Surgery   Phuc Rosa 62 y o  female MRN: [id number]  Unit/Bed#: -01 Encounter: [id number]      Subjective/Objective     Subjective:  Patient with hx  Of RYGB 2015 s/p Dx  Lap converted to open, small bowel resection, LUCY POD#2  Sauceda catheter intact  Pain adequately controlled - reports mild incisional soreness  Ambulating without assistance and using incentive spirometer  Denies fevers, chills, sweats, SOB, CP, calf pain or swelling  Objective:     /64 (BP Location: Left arm)   Pulse 68   Temp 99 °F (37 2 °C) (Oral)   Resp 18   Ht 5' 5" (1 651 m)   Wt 104 kg (228 lb 9 9 oz)   SpO2 95%   BMI 38 04 kg/m²       Intake/Output Summary (Last 24 hours) at 01/22/19 0823  Last data filed at 01/22/19 0648   Gross per 24 hour   Intake             1275 ml   Output             1050 ml   Net              225 ml       Invasive Devices     Peripheral Intravenous Line            Peripheral IV 01/20/19 Right;Ventral (anterior) Forearm 1 day    Peripheral IV 01/21/19 Left Arm 1 day          Drain            Urethral Catheter Latex 16 Fr  1 day                ROS: 10-point system completed  All negative except see HPI  Physical Exam    General Appearance:    Alert, cooperative, no distress, appears stated age   Head:    Normocephalic, without obvious abnormality, atraumatic   Lungs:     Clear to auscultation bilaterally, respirations unlabored   Heart:    Regular rate and rhythm, S1 and S2 normal, no murmur, rub    or gallop   Abdomen:     Soft, appropriate tenderness, hypoactive BS, non distended, incisions clean, dry, and staples intact  Sauceda intact - yellow urine  Extremities:   Extremities normal, atraumatic, no cyanosis or edema   Neurologic:   CNII-XII intact  Normal strength and sensation               Lab, Imaging and other studies:  I have personally reviewed pertinent lab results    , CBC:   Lab Results   Component Value Date    WBC 11 49 (H) 01/22/2019    HGB 8 9 (L) 01/22/2019    HCT 27 8 (L) 01/22/2019    MCV 88 01/22/2019     01/22/2019    MCH 28 2 01/22/2019    MCHC 32 0 01/22/2019    RDW 13 4 01/22/2019    MPV 9 6 01/22/2019    NRBC 0 01/22/2019   , CMP:   Lab Results   Component Value Date    SODIUM 138 01/22/2019    K 4 5 01/22/2019     01/22/2019    CO2 27 01/22/2019    BUN 14 01/22/2019    CREATININE 0 74 01/22/2019    CALCIUM 8 2 (L) 01/22/2019    EGFR 90 01/22/2019        VTE Mechanical Prophylaxis: sequential compression device, Lovenox    Assessment/Plan  57yo F with hx  Of RYGB 2015 s/p Dx  Lap converted to open, small bowel resection, LUCY POD#2 with stable post op course  Noted WBC trending down - on Zosyn  Hgb down to 8 9 today - likely dilutional  No BM or flatus, hypoactive BS  Encouraged walking in hallways      -Start bariatric clears  -D/C boyd catheter  -Continue IVF   -Continue Zosyn and trend WBC  -Encourage incentive spirometry and ambulation in the hallways  -Surgical team will continue to monitor    Plan of care was discussed with patient and patient's nurse  Care plan discussed with Dr Maira Garcia PA-C  1/22/2019  8:25 AM

## 2019-01-22 NOTE — ASSESSMENT & PLAN NOTE
Patient reports history of hypertension but has been off of any BP meds since bypass surgery    Currently BP controlled  Continue to monitor

## 2019-01-22 NOTE — ASSESSMENT & PLAN NOTE
Patient reports history of hypertension but has been off of any BP meds since bypass surgery  Currently BP controlled  Recommended close follow up outpatient with primary care provider once discharged

## 2019-01-22 NOTE — CONSULTS
Consult- Constanza Lissa 1960, 62 y o  female MRN: [id number]    Unit/Bed#: -01 Encounter: [id number]    Primary Care Provider: Niles Bray PA-C   Date and time admitted to hospital: 1/20/2019  7:15 PM      Inpatient consult to Internal Medicine  Consult performed by: Darell Chiu ordered by: Donald Conklin          * Ischemic necrosis of small bowel [facility])   Assessment & Plan    Past medical history of Dontae-en-Y gastric bypass 2015  Status post diagnostic lap converted to open, small-bowel resection postop day # 2  Abdomen soft, positive bowel sounds, flatus, no bowel movement yet  Patient reports feeling better  Hemodynamically stable denies any new complaints  Currently on clear liquids  Discontinue Sauceda  Out of bed to chair as tolerated  Advanced diet as tolerated per Surgery   Primary team: Bariatric surgery         Hypertension   Assessment & Plan    Patient reports history of hypertension but has been off of any BP meds since bypass surgery  Currently BP controlled  Continue to monitor     Leukocytosis   Assessment & Plan    Leukocytosis likely related to small bowel necrosis  Status post small bowel resection POD#2   Currently improving on Zosyn  Continue to monitor CBC     Iron deficiency anemia   Assessment & Plan    Baseline hemoglobin around 11-12 range  Previous iron studies were consistent with iron deficiency  Patient denies being on any supplements  Current hemoglobin 8 9  Patient denies hematuria, melena, hematochezia  No signs of overt bleeding noted  Hemodynamically stable  Continue to trend CBC  Transfuse if below 7  Start on iron supplement           VTE Prophylaxis: Fondaparinux (Arixtra)  / sequential compression device     Counseling / Coordination of Care Time: 30 minutes  Greater than 50% of total time spent on patient counseling and coordination of care  Collaboration of Care:  Were Recommendations Directly Discussed with Primary Treatment Team? - No     History of Present Illness:    Kayleen Lopez is a 62 y o  female with past medical history of Dontae-en-Y gastric bypass in 2015, hypertension,  who is originally admitted to the bariatric surgery service due to small bowel necrosis  We are consulted for medical management  Patient initially had presented with complaining of abdominal pain and nausea, found to have cholelithiasis, internal hernia and small bowel necrosis requiring ex lap converted to open and small bowel resection, POD #2  Currently patient seen not in acute distress  Patient reports feeling better  Reports having flatus, no bowel movement yet  Reports she has not been on BP medications since having bypass surgery  Denies taking any other prescribed or over-the-counter medications at home  Denies chest pain, fever, chills, nausea, vomiting, diarrhea  Currently on clear liquids tolerating well  No other events reported  Level 1 full code  Ambulates independently  Review of Systems:    Review of Systems   Constitutional: Negative for chills, diaphoresis, fatigue and fever  HENT: Negative for rhinorrhea  Eyes: Negative for photophobia  Respiratory: Negative for cough, shortness of breath and wheezing  Cardiovascular: Negative for chest pain and palpitations  Gastrointestinal: Negative for diarrhea, nausea and vomiting  Endocrine: Negative for polyuria  Genitourinary: Negative for dysuria  Skin: Negative for rash  Neurological: Negative for dizziness  Psychiatric/Behavioral: Negative for agitation         Past Medical and Surgical History:     Past Medical History:   Diagnosis Date    Disease of thyroid gland     Hypertension        Past Surgical History:   Procedure Laterality Date    GASTRIC BYPASS      ID LAP,DIAGNOSTIC ABDOMEN N/A 1/20/2019    Procedure: LAPAROSCOPY DIAGNOSTIC CONVERTED TO OPEN; SMALL BOWEL RESECTION; LYSIS OF ADHESIONS;  Surgeon: Kelvin Wilburn MD;  Location: [facility];  Service: General Meds/Allergies:    all medications and allergies reviewed    Allergies: No Known Allergies    Social History:     Marital Status: /Civil Union    Substance Use History:   History   Alcohol Use No     History   Smoking Status    Never Smoker   Smokeless Tobacco    Never Used     History   Drug Use No       Family History:    non-contributory    Physical Exam:     Vitals:   Blood Pressure: 135/64 (01/22/19 0716)  Pulse: 68 (01/22/19 0716)  Temperature: 99 °F (37 2 °C) (01/22/19 0716)  Temp Source: Oral (01/22/19 0716)  Respirations: 18 (01/22/19 0716)  Height: 5' 5" (165 1 cm) (01/20/19 1919)  Weight - Scale: 104 kg (228 lb 9 9 oz) (01/22/19 0600)  SpO2: 95 % (01/22/19 0716)    Physical Exam   Constitutional: She is oriented to person, place, and time  No distress  HENT:   Head: Normocephalic and atraumatic  Eyes: Pupils are equal, round, and reactive to light  EOM are normal    Neck: Normal range of motion  No JVD present  Cardiovascular: Normal rate and regular rhythm  Pulmonary/Chest: Effort normal and breath sounds normal  No respiratory distress  She has no wheezes  Abdominal: Soft  Bowel sounds are normal  She exhibits no distension  There is no tenderness  Surgical wound noted clean, without any discharge, no erythema  Musculoskeletal: Normal range of motion  She exhibits no edema  Neurological: She is alert and oriented to person, place, and time  Psychiatric: She has a normal mood and affect  Her behavior is normal        Additional Data:     Lab Results: I have personally reviewed pertinent reports          Results from last 7 days  Lab Units 01/22/19  0648 01/21/19  0431   WBC Thousand/uL 11 49* 16 73*   HEMOGLOBIN g/dL 8 9* 11 0*   HEMATOCRIT % 27 8* 34 5*   PLATELETS Thousands/uL 205 277   BANDS PCT %  --  6   NEUTROS PCT % 75  --    LYMPHS PCT % 18  --    LYMPHO PCT %  --  7*   MONOS PCT % 7  --    MONO PCT %  --  7   EOS PCT % 0 0       Results from last 7 days  Lab Units 19  0531 19  0431   SODIUM mmol/L 138 141   POTASSIUM mmol/L 4 5 4 0   CHLORIDE mmol/L 105 107   CO2 mmol/L 27 26   BUN mg/dL 14 19   CREATININE mg/dL 0 74 0 80   ANION GAP mmol/L 6 8   CALCIUM mg/dL 8 2* 8 4   ALBUMIN g/dL  --  3 0*   TOTAL BILIRUBIN mg/dL  --  0 20   ALK PHOS U/L  --  80   ALT U/L  --  21   AST U/L  --  24   GLUCOSE RANDOM mg/dL 260* 158*       Results from last 7 days  Lab Units 19  0431   INR  1 18*       Results from last 7 days  Lab Units 19   TROPONIN I ng/mL <0 02     Lab Results   Component Value Date/Time    HGBA1C 5 4 2018 07:55 AM    HGBA1C 5 6 2017 07:58 AM       Results from last 7 days  Lab Units 19  0534 19  0020 19  1959 19  0600   POC GLUCOSE mg/dl 99 109 110 155*       Results from last 7 days  Lab Units 19  0431 19  2131   LACTIC ACID mmol/L  --  1 2   PROCALCITONIN ng/ml <0 05  --        Imaging: I have personally reviewed pertinent reports  CT abdomen pelvis with contrast   Final Result by Brandon Felix MD ([id number])   1  Clustered mild to moderately dilated small bowel loops noted in the lower abdomen/pelvis with wall thickening and associated mesenteric venous congestion  Findings concerning for internal hernia  2   Cholelithiasis  There is either mild pericholecystic fluid versus gallbladder wall thickening towards the fundus  Findings are non-specific  This could be further evaluated with ultrasound  I personally discussed this study with Hayley Mckinney on 2019 at 9:12 PM                Workstation performed: [id number]             EKG, Pathology, and Other Studies Reviewed on Admission:   · EK/20 report noted for sinus bradycardia with heart rate 50 without any other significant abnormalities  ** Please Note: This note has been constructed using a voice recognition system   **

## 2019-01-22 NOTE — ASSESSMENT & PLAN NOTE
Past medical history of Dontae-en-Y gastric bypass 2015  Status post diagnostic lap converted to open, small-bowel resection postop day # 2  Abdomen soft, positive bowel sounds, flatus, no bowel movement yet  Patient reports feeling better  Hemodynamically stable denies any new complaints    Currently on clear liquids  Discontinue Sauceda  Out of bed to chair as tolerated  Advanced diet as tolerated per Surgery   Primary team: Bariatric surgery

## 2019-01-22 NOTE — ASSESSMENT & PLAN NOTE
Past medical history of Dontae-en-Y gastric bypass 2015  Status post diagnostic lap converted to open, small-bowel resection postop day # 3  Abdomen soft, positive bowel sounds, no bowel movement yet  Patient reports feeling better  Hemodynamically stable denies any new complaints  Currently on clear liquids  Advanced diet as tolerated once she starts having bowel movement    Primary team: Bariatric surgery

## 2019-01-23 LAB
BASOPHILS # BLD AUTO: 0.03 THOUSANDS/ΜL (ref 0–0.1)
BASOPHILS NFR BLD AUTO: 0 % (ref 0–1)
EOSINOPHIL # BLD AUTO: 0.16 THOUSAND/ΜL (ref 0–0.61)
EOSINOPHIL NFR BLD AUTO: 2 % (ref 0–6)
ERYTHROCYTE [DISTWIDTH] IN BLOOD BY AUTOMATED COUNT: 13.3 % (ref 11.6–15.1)
HCT VFR BLD AUTO: 26.8 % (ref 34.8–46.1)
HGB BLD-MCNC: 8.5 G/DL (ref 11.5–15.4)
IMM GRANULOCYTES # BLD AUTO: 0.03 THOUSAND/UL (ref 0–0.2)
IMM GRANULOCYTES NFR BLD AUTO: 0 % (ref 0–2)
LYMPHOCYTES # BLD AUTO: 1.89 THOUSANDS/ΜL (ref 0.6–4.47)
LYMPHOCYTES NFR BLD AUTO: 21 % (ref 14–44)
MCH RBC QN AUTO: 28.2 PG (ref 26.8–34.3)
MCHC RBC AUTO-ENTMCNC: 31.7 G/DL (ref 31.4–37.4)
MCV RBC AUTO: 89 FL (ref 82–98)
MONOCYTES # BLD AUTO: 0.5 THOUSAND/ΜL (ref 0.17–1.22)
MONOCYTES NFR BLD AUTO: 6 % (ref 4–12)
NEUTROPHILS # BLD AUTO: 6.25 THOUSANDS/ΜL (ref 1.85–7.62)
NEUTS SEG NFR BLD AUTO: 71 % (ref 43–75)
NRBC BLD AUTO-RTO: 0 /100 WBCS
PLATELET # BLD AUTO: 182 THOUSANDS/UL (ref 149–390)
PMV BLD AUTO: 10 FL (ref 8.9–12.7)
RBC # BLD AUTO: 3.01 MILLION/UL (ref 3.81–5.12)
WBC # BLD AUTO: 8.86 THOUSAND/UL (ref 4.31–10.16)

## 2019-01-23 PROCEDURE — 99232 SBSQ HOSP IP/OBS MODERATE 35: CPT | Performed by: STUDENT IN AN ORGANIZED HEALTH CARE EDUCATION/TRAINING PROGRAM

## 2019-01-23 PROCEDURE — 85025 COMPLETE CBC W/AUTO DIFF WBC: CPT | Performed by: PHYSICIAN ASSISTANT

## 2019-01-23 PROCEDURE — C9113 INJ PANTOPRAZOLE SODIUM, VIA: HCPCS | Performed by: NURSE PRACTITIONER

## 2019-01-23 RX ADMIN — PANTOPRAZOLE SODIUM 40 MG: 40 INJECTION, POWDER, FOR SOLUTION INTRAVENOUS at 22:30

## 2019-01-23 RX ADMIN — FERROUS SULFATE TAB 325 MG (65 MG ELEMENTAL FE) 325 MG: 325 (65 FE) TAB at 12:05

## 2019-01-23 RX ADMIN — FERROUS SULFATE TAB 325 MG (65 MG ELEMENTAL FE) 325 MG: 325 (65 FE) TAB at 09:27

## 2019-01-23 RX ADMIN — PANTOPRAZOLE SODIUM 40 MG: 40 INJECTION, POWDER, FOR SOLUTION INTRAVENOUS at 09:27

## 2019-01-23 RX ADMIN — PIPERACILLIN SODIUM,TAZOBACTAM SODIUM 3.38 G: 3; .375 INJECTION, POWDER, FOR SOLUTION INTRAVENOUS at 10:45

## 2019-01-23 RX ADMIN — ENOXAPARIN SODIUM 40 MG: 40 INJECTION SUBCUTANEOUS at 09:26

## 2019-01-23 RX ADMIN — DEXTROSE, SODIUM CHLORIDE, AND POTASSIUM CHLORIDE 75 ML/HR: 5; .45; .15 INJECTION INTRAVENOUS at 14:12

## 2019-01-23 RX ADMIN — Medication 1 TABLET: at 09:27

## 2019-01-23 RX ADMIN — OXYCODONE HYDROCHLORIDE 5 MG: 5 TABLET ORAL at 19:31

## 2019-01-23 RX ADMIN — PIPERACILLIN SODIUM,TAZOBACTAM SODIUM 3.38 G: 3; .375 INJECTION, POWDER, FOR SOLUTION INTRAVENOUS at 05:55

## 2019-01-23 NOTE — OCCUPATIONAL THERAPY NOTE
Occupational Therapy Screen        Patient Name: Marsha Rodríguez  HTFGG'L Date: 1/23/2019    OT orders received  Chart received performed  Based on conversation with nursing and PT, pt appears to be performing at functional baseline  OT performed screen and discussed results with pt  Pt does not demonstrate skilled OT needs at this time  OT provided edu and card with OT contact information should something change and the pt decided further OT assessment might be waranted  However, at this time, pt will not be seen further by OT services  DC OT orders       Erendira Hanks MS OTR/L

## 2019-01-23 NOTE — PROGRESS NOTES
Progress Note - Kayleen Post 1960, 62 y o  female MRN: [id number]    Unit/Bed#: -01 Encounter: [id number]    Primary Care Provider: Jade Edgar PA-C   Date and time admitted to hospital: 1/20/2019  7:15 PM        * Ischemic necrosis of small bowel ([facility] [address] )   Assessment & Plan    Past medical history of Dontae-en-Y gastric bypass 2015  Status post diagnostic lap converted to open, small-bowel resection postop day # 3  Abdomen soft, positive bowel sounds, no bowel movement yet  Patient reports feeling better  Hemodynamically stable denies any new complaints  Currently on clear liquids  Advanced diet as tolerated once she starts having bowel movement  Despite planning Home: pending bowel function  Primary team: Bariatric surgery         Hypertension   Assessment & Plan    Patient reports history of hypertension but has been off of any BP meds since bypass surgery  Currently BP controlled  Recommended close follow up outpatient with primary care provider once discharged  Leukocytosis   Assessment & Plan    Resolved  Patient was on Zosyn dc on 01/23/2019  Currently monitor off antibiotics     Iron deficiency anemia   Assessment & Plan    Baseline hemoglobin around 11-12 range  Previous iron studies were consistent with iron deficiency  Patient denies being on any supplements  Current hemoglobin 8 5  Patient denies hematuria, melena, hematochezia  No signs of overt bleeding noted  Hemodynamically stable  Start on iron supplement once she starts having bowel movement and on regular diet  VTE Pharmacologic Prophylaxis:   Pharmacologic: Enoxaparin (Lovenox)  Mechanical VTE Prophylaxis in Place: Yes    Patient Centered Rounds: I have performed bedside rounds with nursing staff today  Discussions with Specialists or Other Care Team Provider:     Education and Discussions with Family / Patient:  Discussed with patient bedside  Answered all questions appropriate      Time Spent for Care: 20 minutes  More than 50% of total time spent on counseling and coordination of care as described above  Current Length of Stay: 2 day(s)    Current Patient Status: Inpatient   Certification Statement: The patient will continue to require additional inpatient hospital stay due to [address] home pending bowel function  Discharge Plan:  As per above    Code Status: Level 1 - Full Code      Subjective:   POD #3   Seen ambulating in hallway  Reports feeling much better  Positive bowel sounds  No bowel movement yet  Tolerating liquid diet well  Denies chest pain, dyspnea, abdominal pain, nausea, vomiting  No other events reported  Objective:     Vitals:   Temp (24hrs), Av 5 °F (36 9 °C), Min:98 3 °F (36 8 °C), Max:98 6 °F (37 °C)    Temp:  [98 3 °F (36 8 °C)-98 6 °F (37 °C)] 98 3 °F (36 8 °C)  HR:  [70-86] 86  Resp:  [19-20] 19  BP: (130-135)/(70-71) 130/70  SpO2:  [95 %-96 %] 95 %  Body mass index is 38 04 kg/m²  Input and Output Summary (last 24 hours): Intake/Output Summary (Last 24 hours) at 19 1240  Last data filed at 19 1100   Gross per 24 hour   Intake          1148 33 ml   Output             1450 ml   Net          -301 67 ml       Physical Exam:     Physical Exam   Constitutional: She is oriented to person, place, and time  No distress  HENT:   Head: Normocephalic and atraumatic  Eyes: Pupils are equal, round, and reactive to light  Neck: Normal range of motion  No JVD present  Cardiovascular: Normal rate and regular rhythm  Pulmonary/Chest: Effort normal and breath sounds normal  No respiratory distress  She has no wheezes  Abdominal: Soft  Bowel sounds are normal  She exhibits no distension  There is no tenderness  Surgical wound noted  No discharge or erythema noted  Musculoskeletal: Normal range of motion  Neurological: She is alert and oriented to person, place, and time  Psychiatric: She has a normal mood and affect   Her behavior is normal  Additional Data:     Labs:      Results from last 7 days  Lab Units 01/23/19  0640  01/21/19  0431   WBC Thousand/uL 8 86  < > 16 73*   HEMOGLOBIN g/dL 8 5*  < > 11 0*   HEMATOCRIT % 26 8*  < > 34 5*   PLATELETS Thousands/uL 182  < > 277   BANDS PCT %  --   --  6   NEUTROS PCT % 71  < >  --    LYMPHS PCT % 21  < >  --    LYMPHO PCT %  --   --  7*   MONOS PCT % 6  < >  --    MONO PCT %  --   --  7   EOS PCT % 2  < > 0   < > = values in this interval not displayed  Results from last 7 days  Lab Units 01/22/19  0531 01/21/19  0431   SODIUM mmol/L 138 141   POTASSIUM mmol/L 4 5 4 0   CHLORIDE mmol/L 105 107   CO2 mmol/L 27 26   BUN mg/dL 14 19   CREATININE mg/dL 0 74 0 80   ANION GAP mmol/L 6 8   CALCIUM mg/dL 8 2* 8 4   ALBUMIN g/dL  --  3 0*   TOTAL BILIRUBIN mg/dL  --  0 20   ALK PHOS U/L  --  80   ALT U/L  --  21   AST U/L  --  24   GLUCOSE RANDOM mg/dL 260* 158*       Results from last 7 days  Lab Units 01/21/19  0431   INR  1 18*       Results from last 7 days  Lab Units 01/22/19  0534 01/22/19  0020 01/21/19  1959 01/21/19  0600   POC GLUCOSE mg/dl 99 109 110 155*           Results from last 7 days  Lab Units 01/21/19  0431 01/20/19  2131   LACTIC ACID mmol/L  --  1 2   PROCALCITONIN ng/ml <0 05  --            * I Have Reviewed All Lab Data Listed Above  * Additional Pertinent Lab Tests Reviewed:  All Labs Within Last 24 Hours Reviewed      Recent Cultures (last 7 days):           Last 24 Hours Medication List:     Current Facility-Administered Medications:  dextrose 5 % and sodium chloride 0 45 % with KCl 20 mEq/L 75 mL/hr Intravenous Continuous Aicha Wade MD Last Rate: 75 mL/hr (01/22/19 2046)   enoxaparin 40 mg Subcutaneous Q24H [address] Junita JACOB Chavez    HYDROmorphone 0 5 mg Intravenous Q4H PRN Aicha Wade MD    multivitamin 1 tablet Oral Daily Aicha Wade MD    ondansetron 4 mg Intravenous Q4H PRN Aicha Wade MD    oxyCODONE 10 mg Oral Q3H PRN Aicha Wade MD    oxyCODONE 5 mg Oral Q3H PRN Sophia Jimenez MD    pantoprazole 40 mg Intravenous Q12H [facility] FINESSE Floyd    piperacillin-tazobactam 3 375 g Intravenous Q6H FINESSE Andrade Last Rate: 3 375 g (01/23/19 1045)        Today, Patient Was Seen By: Daniel Ibarra MD    ** Please Note: Dictation voice to text software may have been used in the creation of this document   **

## 2019-01-23 NOTE — PROGRESS NOTES
Progress Note - Bariatric Surgery   Jackie Gresham 62 y o  female MRN: [id number]  Unit/Bed#: MS [facility] Encounter: [id number]    Assessment:  61yo F s/p diagnostic lap converted to open SBR, LUCY on 1/21/19  Doing well  No bowel function but active bowel sounds    Plan:  1  Cont bariatric clears  2  Cont pulm toilet, OOB in halls  3  VTE ppx  4  dispo planning pending bowel function    Subjective/Objective   Chief Complaint: none    Subjective: Ambulating halls, tolerating clears and protein shakes, denies n/v/sob/cp, -F/BM    Objective: sitting in chair, appears well overall    Vitals: Blood pressure 130/70, pulse 86, temperature 98 3 °F (36 8 °C), temperature source Oral, resp  rate 19, height 5' 5" (1 651 m), weight 104 kg (228 lb 9 9 oz), SpO2 95 %  ,Body mass index is 38 04 kg/m²  Intake/Output Summary (Last 24 hours) at 01/23/19 1119  Last data filed at 01/22/19 2130   Gross per 24 hour   Intake          1148 33 ml   Output             1250 ml   Net          -101 67 ml       Invasive Devices     Peripheral Intravenous Line            Peripheral IV 01/22/19 Right Forearm less than 1 day                Physical Exam: NAD, EOMI, abdominal incisions c/d/i, staples intact, ND, appropriate tenderness at incisions, -r/g, SCDs on and working      Lab, Imaging and other studies:   I have personally reviewed pertinent labs    CBC:   Lab Results   Component Value Date    WBC 8 86 01/23/2019    HGB 8 5 (L) 01/23/2019    HCT 26 8 (L) 01/23/2019    MCV 89 01/23/2019     01/23/2019    MCH 28 2 01/23/2019    MCHC 31 7 01/23/2019    RDW 13 3 01/23/2019    MPV 10 0 01/23/2019    NRBC 0 01/23/2019     VTE Pharmacologic Prophylaxis: Sequential compression device (Venodyne)  and Enoxaparin (Lovenox)  VTE Mechanical Prophylaxis: sequential compression device

## 2019-01-24 VITALS
SYSTOLIC BLOOD PRESSURE: 133 MMHG | WEIGHT: 216.27 LBS | HEART RATE: 94 BPM | BODY MASS INDEX: 36.03 KG/M2 | RESPIRATION RATE: 18 BRPM | DIASTOLIC BLOOD PRESSURE: 75 MMHG | HEIGHT: 65 IN | OXYGEN SATURATION: 96 % | TEMPERATURE: 98.8 F

## 2019-01-24 PROBLEM — D72.829 LEUKOCYTOSIS: Status: RESOLVED | Noted: 2019-01-22 | Resolved: 2019-01-24

## 2019-01-24 PROBLEM — Z98.890 S/P EXPLORATORY LAPAROTOMY: Status: ACTIVE | Noted: 2019-01-24

## 2019-01-24 LAB
ANION GAP SERPL CALCULATED.3IONS-SCNC: 8 MMOL/L (ref 4–13)
BASOPHILS # BLD AUTO: 0.04 THOUSANDS/ΜL (ref 0–0.1)
BASOPHILS NFR BLD AUTO: 1 % (ref 0–1)
BUN SERPL-MCNC: 10 MG/DL (ref 5–25)
CALCIUM SERPL-MCNC: 8.6 MG/DL (ref 8.3–10.1)
CHLORIDE SERPL-SCNC: 107 MMOL/L (ref 100–108)
CO2 SERPL-SCNC: 27 MMOL/L (ref 21–32)
CREAT SERPL-MCNC: 0.63 MG/DL (ref 0.6–1.3)
EOSINOPHIL # BLD AUTO: 0.25 THOUSAND/ΜL (ref 0–0.61)
EOSINOPHIL NFR BLD AUTO: 4 % (ref 0–6)
ERYTHROCYTE [DISTWIDTH] IN BLOOD BY AUTOMATED COUNT: 13.2 % (ref 11.6–15.1)
GFR SERPL CREATININE-BSD FRML MDRD: 99 ML/MIN/1.73SQ M
GLUCOSE SERPL-MCNC: 94 MG/DL (ref 65–140)
HCT VFR BLD AUTO: 26.4 % (ref 34.8–46.1)
HGB BLD-MCNC: 8.5 G/DL (ref 11.5–15.4)
IMM GRANULOCYTES # BLD AUTO: 0.02 THOUSAND/UL (ref 0–0.2)
IMM GRANULOCYTES NFR BLD AUTO: 0 % (ref 0–2)
LYMPHOCYTES # BLD AUTO: 1.75 THOUSANDS/ΜL (ref 0.6–4.47)
LYMPHOCYTES NFR BLD AUTO: 26 % (ref 14–44)
MCH RBC QN AUTO: 28.5 PG (ref 26.8–34.3)
MCHC RBC AUTO-ENTMCNC: 32.2 G/DL (ref 31.4–37.4)
MCV RBC AUTO: 89 FL (ref 82–98)
MONOCYTES # BLD AUTO: 0.45 THOUSAND/ΜL (ref 0.17–1.22)
MONOCYTES NFR BLD AUTO: 7 % (ref 4–12)
NEUTROPHILS # BLD AUTO: 4.36 THOUSANDS/ΜL (ref 1.85–7.62)
NEUTS SEG NFR BLD AUTO: 62 % (ref 43–75)
NRBC BLD AUTO-RTO: 0 /100 WBCS
PLATELET # BLD AUTO: 206 THOUSANDS/UL (ref 149–390)
PMV BLD AUTO: 10.2 FL (ref 8.9–12.7)
POTASSIUM SERPL-SCNC: 3.6 MMOL/L (ref 3.5–5.3)
RBC # BLD AUTO: 2.98 MILLION/UL (ref 3.81–5.12)
SODIUM SERPL-SCNC: 142 MMOL/L (ref 136–145)
WBC # BLD AUTO: 6.87 THOUSAND/UL (ref 4.31–10.16)

## 2019-01-24 PROCEDURE — 85025 COMPLETE CBC W/AUTO DIFF WBC: CPT | Performed by: STUDENT IN AN ORGANIZED HEALTH CARE EDUCATION/TRAINING PROGRAM

## 2019-01-24 PROCEDURE — 99232 SBSQ HOSP IP/OBS MODERATE 35: CPT | Performed by: INTERNAL MEDICINE

## 2019-01-24 PROCEDURE — C9113 INJ PANTOPRAZOLE SODIUM, VIA: HCPCS | Performed by: NURSE PRACTITIONER

## 2019-01-24 PROCEDURE — 80048 BASIC METABOLIC PNL TOTAL CA: CPT | Performed by: STUDENT IN AN ORGANIZED HEALTH CARE EDUCATION/TRAINING PROGRAM

## 2019-01-24 RX ORDER — OXYCODONE HYDROCHLORIDE AND ACETAMINOPHEN 5; 325 MG/1; MG/1
1 TABLET ORAL EVERY 4 HOURS PRN
Qty: 15 TABLET | Refills: 0 | Status: SHIPPED | OUTPATIENT
Start: 2019-01-24 | End: 2019-02-03

## 2019-01-24 RX ADMIN — PANTOPRAZOLE SODIUM 40 MG: 40 INJECTION, POWDER, FOR SOLUTION INTRAVENOUS at 09:21

## 2019-01-24 RX ADMIN — ENOXAPARIN SODIUM 40 MG: 40 INJECTION SUBCUTANEOUS at 09:21

## 2019-01-24 RX ADMIN — DEXTROSE, SODIUM CHLORIDE, AND POTASSIUM CHLORIDE 75 ML/HR: 5; .45; .15 INJECTION INTRAVENOUS at 03:10

## 2019-01-24 RX ADMIN — Medication 1 TABLET: at 09:21

## 2019-01-24 NOTE — DISCHARGE SUMMARY
Discharge Summary - Lowell Lemus 62 y o  female MRN: [id number]    Unit/Bed#: -01 Encounter: [id number]      Pre-Operative Diagnosis:   Internal Hernia    Post-Operative Diagnosis:  Ischemic necrosis of small bowel  Pelvic Adhesions    Procedures Performed:  Procedure(s):  LAPAROSCOPY DIAGNOSTIC CONVERTED TO OPEN; SMALL BOWEL RESECTION; LYSIS OF ADHESIONS    Surgeon: Milly Dial MD    See H & P for full details of admission and Operative Note for full details of operations performed  Patient was seen and examined prior to discharge  Provisions for Follow-Up Care:  See After Visit Summary/Discharge Instructions for information related to follow-up care and home orders  Disposition: Home, in stable condition  Planned Readmission: No    Discharge Medications:  See After Visit Summary/Discharge Instructions for reconciled discharge medications provided to patient and family  Post Operative instructions: Reviewed with patient and/or family  This text is generated with voice recognition software  There may be translation, syntax,  or grammatical errors  If you have any questions, please contact the dictating provider       Signature:   Wolf Ornelas PA-C  Date: 1/24/2019 Time: 9:07 AM

## 2019-01-24 NOTE — PROGRESS NOTES
[facility] Internal Medicine Progress Note  Patient: Toav Sanches 62 y o  female   MRN: [id number]  PCP: Elvan Schirmer, PA-C  Unit/Bed#: MS [facility] Encounter: [id number]  Date Of Visit: 19          * Ischemic necrosis of small bowel [facility])   Assessment & Plan    Status post surgery  Postop day 4  Doing very well  Discharge as per primary team        Iron deficiency anemia   Assessment & Plan    Hemoglobin stable  Continue to monitor hemoglobin on outpatient basis     Hypertension   Assessment & Plan    Essential hypertension  Do not see that she is on any medications at home or here  Present on Admission:   Ischemic necrosis of small bowel ([address] )   Hypertension   (Resolved) Leukocytosis   Iron deficiency anemia    Medically stable for discharge  We will sign off  VTE Pharmacologic Prophylaxis:   Pharmacologic: Enoxaparin (Lovenox)  Mechanical VTE Prophylaxis in Place: Yes    Patient Centered Rounds: I have performed bedside rounds with nursing staff today  Discussions with Specialists or Other Care Team Provider:  Yes    Education and Discussions with Family / Patient:  Yes      Current Length of Stay: 3 day(s)    Current Patient Status: Inpatient   Certification Statement: As per primary team    Discharge Plan:  Home as per primary team    Code Status: Level 1 - Full Code      Subjective:   Patient feels good  Has almost no abdominal pain  She is passing flatus  Tolerating her current diet  Donivan Abhishek to be discharged home as she is planning to go for a cruise in the next couple of days  Objective:     Vitals:   Temp (24hrs), Av 7 °F (37 1 °C), Min:98 5 °F (36 9 °C), Max:98 8 °F (37 1 °C)    Temp:  [98 5 °F (36 9 °C)-98 8 °F (37 1 °C)] 98 8 °F (37 1 °C)  HR:  [85-97] 94  Resp:  [18-20] 18  BP: (132-136)/(72-79) 133/75  SpO2:  [96 %-97 %] 96 %  Body mass index is 35 99 kg/m²  Input and Output Summary (last 24 hours):        Intake/Output Summary (Last 24 hours) at 19 [facility] 4752 filed at 01/24/19 0310   Gross per 24 hour   Intake             2280 ml   Output              550 ml   Net             1730 ml           Physical Exam:     Vital signs reviewed as above  Patient is up in the chair  Not in any respiratory distress  Abdomen is soft  Grossly nontender and bowel sounds are audible  Awake and alert  Oriented x3  S1 and S2 audible  In sinus rhythm  Conjunctiva slightly pale  Mood and affect are pleasant    Additional Data:     Labs:      Results from last 7 days  Lab Units 01/24/19  0548   WBC Thousand/uL 6 87   HEMOGLOBIN g/dL 8 5*   HEMATOCRIT % 26 4*   PLATELETS Thousands/uL 206   NEUTROS PCT % 62   LYMPHS PCT % 26   MONOS PCT % 7   EOS PCT % 4       Results from last 7 days  Lab Units 01/24/19  0548  01/21/19  0431   POTASSIUM mmol/L 3 6  < > 4 0   CHLORIDE mmol/L 107  < > 107   CO2 mmol/L 27  < > 26   BUN mg/dL 10  < > 19   CREATININE mg/dL 0 63  < > 0 80   CALCIUM mg/dL 8 6  < > 8 4   ALK PHOS U/L  --   --  80   ALT U/L  --   --  21   AST U/L  --   --  24   < > = values in this interval not displayed  Results from last 7 days  Lab Units 01/21/19  0431   INR  1 18*       * I Have Reviewed All Lab Data Listed Above  * Additional Pertinent Lab Tests Reviewed:  All Labs Within Last 24 Hours Reviewed        Recent Cultures (last 7 days):           Last 24 Hours Medication List:     Current Facility-Administered Medications:  enoxaparin 40 mg Subcutaneous Q24H [address] Cassie Mendoza PA-C   HYDROmorphone 0 5 mg Intravenous Q4H PRN Darryn Walter MD   multivitamin 1 tablet Oral Daily Darryn Walter MD   ondansetron 4 mg Intravenous Q4H PRN Darryn Walter MD   oxyCODONE 10 mg Oral Q3H PRN Darryn Walter MD   oxyCODONE 5 mg Oral Q3H PRN Darryn Walter MD   pantoprazole 40 mg Intravenous Q12H [address] FINESSE Nunes        Today, Patient Was Seen By: Justine Concepcion MD    ** Please Note: Dragon 360 Dictation voice to text software may have been used in the creation of this document   **

## 2019-01-24 NOTE — PROGRESS NOTES
Progress Note - Bariatric Surgery   Jenna Jenkins 62 y o  female MRN: [id number]  Unit/Bed#: -01 Encounter: [id number]      Subjective/Objective     Subjective: 61yo F with hx  Of RYGB 2015 s/p Dx  Lap converted to open, small bowel resection, LUCY POD#4  Tolerating full liquid diet without nausea or vomiting, pain adequately controlled on oral pain medication, ambulating without assistance, voiding well, using incentive spirometer  Passing gas, but no BM  Denies fevers, chills, sweats, SOB, CP, calf pain  Objective:    /75 (BP Location: Left arm)   Pulse 94   Temp 98 8 °F (37 1 °C) (Oral)   Resp 18   Ht 5' 5" (1 651 m)   Wt 98 1 kg (216 lb 4 3 oz)   SpO2 96%   BMI 35 99 kg/m²       Intake/Output Summary (Last 24 hours) at 01/24/19 0813  Last data filed at 01/24/19 0310   Gross per 24 hour   Intake             2700 ml   Output              750 ml   Net             1950 ml       Invasive Devices     Peripheral Intravenous Line            Peripheral IV 01/22/19 Right Forearm 1 day                ROS: 10-point system completed  All negative except see HPI  Physical Exam    General Appearance:    Alert, cooperative, no distress, appears stated age   Head:    Normocephalic, without obvious abnormality, atraumatic   Lungs:     Clear to auscultation bilaterally, respirations unlabored   Heart:    Regular rate and rhythm, S1 and S2 normal, no murmur, rub    or gallop   Abdomen:     Soft, appropriate tenderness, bowel sounds active all four quadrants, no masses, no organomegaly, non distended, incisions clean, dry, and intact   Extremities:   Extremities normal, atraumatic, no cyanosis or edema   Neurologic:   CNII-XII intact  Normal strength and sensation               Lab, Imaging and other studies:  I have personally reviewed pertinent lab results    , CBC:   Lab Results   Component Value Date    WBC 6 87 01/24/2019    HGB 8 5 (L) 01/24/2019    HCT 26 4 (L) 01/24/2019    MCV 89 01/24/2019    PLT 206 01/24/2019    MCH 28 5 01/24/2019    MCHC 32 2 01/24/2019    RDW 13 2 01/24/2019    MPV 10 2 01/24/2019    NRBC 0 01/24/2019   , CMP:   Lab Results   Component Value Date    SODIUM 142 01/24/2019    K 3 6 01/24/2019     01/24/2019    CO2 27 01/24/2019    BUN 10 01/24/2019    CREATININE 0 63 01/24/2019    CALCIUM 8 6 01/24/2019    EGFR 99 01/24/2019        VTE Mechanical Prophylaxis: sequential compression device    Assessment/Plan  61yo F with hx  Of RYGB 2015 s/p Dx  Lap converted to open, small bowel resection, LUCY POD#4 with stable post op course  Passing gas, tolerating full liquids, ambulating well, pain controlled on oral medications  Labs and vitals stable  Will plan for D/C today  Advised patient to stay on full liquids and slowly advance to purees as tolerated and frequently ambulate at home  She will f/u in the office in 10 days for staple removal       Plan of care was discussed with patient and patient's nurse  Care plan discussed with Dr Sridhar Cobb PA-C  1/24/2019  8:16 AM

## 2019-01-24 NOTE — DISCHARGE INSTRUCTIONS
Bariatric/Weight Loss Surgery  Hospital Discharge Instructions  1  ACTIVITY:  a  Progress as feels comfortable - a good rule is:  if you are doing something and it begins to hurt, stop doing the activity  Walk at least 3 times per day at home and more if tolerated  b  Jeanne Ramirez may walk stairs if you do so slowly  c  You may shower when you get home  d  Use your incentive spirometer 10 times per hour while awake for 1 week  e  No driving until you are medically cleared by Dr Geeta Villalba at your first post operative appointment  Never drive while taking narcotic pain medication, such as Percocet, Darvocet, Oxycodone, Tylenol #3, and Tylenol with Codeine  Follow your pharmacists orders  2  DIET  a  Continue with full liquids until you have your first bowel movement  Slowly advance to puree as tolerated and then soft  Ensure that you are staying adequately hydrated with at least 64oz  Hydrating fluids daily  3  MEDICATIONS:  a  Start vitamins and minerals when you get home  b  Pain Medication as per prescription  c  Other medications as indicated on the Physician Patient Discharge Instructions form given to you at the time of discharge  d  Jeanne Ramirez may use colace or prune juice as needed for mild constipation  Call if issues persist with constipation  4  INCISION CARE  a  You may shower and get incisions wet when you go home  Do not pick at the staples and keep the wound clean and dry   b  Brad Lambert should be removed in the office with your surgeon 10- 14 days after surgery  5  FOLLOW-UP APPOINTMENT should be made for one week after discharge  Call surgeons office at [phone] to schedule an appointment  6  You are strongly encouraged to have regular follow up in the office with the PA and obtain consistent annual bariatric lab testing  7   CALL YOUR DOCTOR FOR:  pain not controlled by pain medications, a temperature greater than 101 5° F, any increase or change in drainage or redness from any incision, any vomiting or inability to keep liquids down, shortness of breath, shoulder pain, or bleeding

## 2019-01-25 ENCOUNTER — TRANSITIONAL CARE MANAGEMENT (OUTPATIENT)
Dept: FAMILY MEDICINE CLINIC | Facility: CLINIC | Age: 59
End: 2019-01-25

## 2019-01-25 ENCOUNTER — TELEPHONE (OUTPATIENT)
Dept: BARIATRICS | Facility: CLINIC | Age: 59
End: 2019-01-25

## 2019-01-25 NOTE — TELEPHONE ENCOUNTER
Pt returned my call  She is doing well  No problems tolerating fluids or food  Needs to schedule her follow up after recent surgery and is overdue for her regular yearly follow up after RNY 4 years ago  She will be scheduling with [facility] office

## 2019-02-06 ENCOUNTER — OFFICE VISIT (OUTPATIENT)
Dept: BARIATRICS | Facility: CLINIC | Age: 59
End: 2019-02-06

## 2019-02-06 VITALS
HEIGHT: 65 IN | TEMPERATURE: 99.3 F | SYSTOLIC BLOOD PRESSURE: 140 MMHG | DIASTOLIC BLOOD PRESSURE: 90 MMHG | WEIGHT: 210.2 LBS | HEART RATE: 65 BPM | BODY MASS INDEX: 35.02 KG/M2

## 2019-02-06 DIAGNOSIS — Z98.890 S/P EXPLORATORY LAPAROTOMY: Primary | ICD-10-CM

## 2019-02-06 PROCEDURE — 99024 POSTOP FOLLOW-UP VISIT: CPT | Performed by: SURGERY

## 2019-02-06 RX ORDER — MULTIVIT-MIN/IRON FUM/FOLIC AC 7.5 MG-4
1 TABLET ORAL DAILY
COMMUNITY

## 2019-02-06 RX ORDER — BIOTIN 1 MG
1 TABLET ORAL DAILY
COMMUNITY
Start: 2016-06-29 | End: 2019-05-02

## 2019-02-06 RX ORDER — LEVOTHYROXINE SODIUM 0.07 MG/1
1 TABLET ORAL DAILY
COMMUNITY
Start: 2015-10-08 | End: 2019-05-02

## 2019-02-06 RX ORDER — SIMVASTATIN 40 MG
1 TABLET ORAL DAILY
COMMUNITY
Start: 2011-10-10 | End: 2019-05-02

## 2019-02-06 NOTE — PROGRESS NOTES
Progress Note - Bariatric Surgery   Nubia Lincoln 62 y o  female MRN: [id number]  Unit/Bed#:  Encounter: [id number]    Assessment:  58/F s/p laparoscopic converted to open small bowel resection, lysis of adhesions   History of LRYGB     Plan:  Regular bariatric diet   Cont vitamins/minerals   May return to work 2/11/19 with 10 lb lifting restriction     Subjective/Objective     Subjective: Feels great; no pain    Objective: Looks well     Blood pressure 140/90, pulse 65, temperature 99 3 °F (37 4 °C), temperature source Tympanic, height 5' 5" (1 651 m), weight 95 3 kg (210 lb 3 2 oz)  ,Body mass index is 34 98 kg/m²          Physical Exam:     No distress   RRR  Breathing non labored   Abd wounds healing well without erythema (all staples removed) and benzoin with steristrips applied to lower midline incision

## 2019-05-01 ENCOUNTER — APPOINTMENT (EMERGENCY)
Dept: RADIOLOGY | Facility: HOSPITAL | Age: 59
End: 2019-05-01
Payer: OTHER MISCELLANEOUS

## 2019-05-01 ENCOUNTER — HOSPITAL ENCOUNTER (EMERGENCY)
Facility: HOSPITAL | Age: 59
Discharge: HOME | End: 2019-05-01
Attending: EMERGENCY MEDICINE
Payer: OTHER MISCELLANEOUS

## 2019-05-01 VITALS
SYSTOLIC BLOOD PRESSURE: 184 MMHG | RESPIRATION RATE: 17 BRPM | OXYGEN SATURATION: 99 % | BODY MASS INDEX: 36.14 KG/M2 | HEART RATE: 86 BPM | TEMPERATURE: 97.9 F | WEIGHT: 216.93 LBS | HEIGHT: 65 IN | DIASTOLIC BLOOD PRESSURE: 96 MMHG

## 2019-05-01 DIAGNOSIS — S82.001A: Primary | ICD-10-CM

## 2019-05-01 PROCEDURE — 99284 EMERGENCY DEPT VISIT MOD MDM: CPT | Performed by: PHYSICIAN ASSISTANT

## 2019-05-01 PROCEDURE — 73564 X-RAY EXAM KNEE 4 OR MORE: CPT

## 2019-05-01 PROCEDURE — 99283 EMERGENCY DEPT VISIT LOW MDM: CPT

## 2019-05-01 RX ORDER — OXYCODONE HYDROCHLORIDE AND ACETAMINOPHEN 5; 325 MG/1; MG/1
1 TABLET ORAL 3 TIMES DAILY PRN
Qty: 9 TABLET | Refills: 0 | Status: SHIPPED | OUTPATIENT
Start: 2019-05-01 | End: 2019-05-02

## 2019-05-02 ENCOUNTER — OFFICE VISIT (OUTPATIENT)
Dept: OBGYN CLINIC | Facility: OTHER | Age: 59
End: 2019-05-02
Payer: OTHER MISCELLANEOUS

## 2019-05-02 VITALS
HEART RATE: 84 BPM | BODY MASS INDEX: 34.99 KG/M2 | WEIGHT: 210 LBS | HEIGHT: 65 IN | DIASTOLIC BLOOD PRESSURE: 80 MMHG | SYSTOLIC BLOOD PRESSURE: 132 MMHG

## 2019-05-02 DIAGNOSIS — S82.024A CLOSED NONDISPLACED LONGITUDINAL FRACTURE OF RIGHT PATELLA, INITIAL ENCOUNTER: Primary | ICD-10-CM

## 2019-05-02 PROCEDURE — 99243 OFF/OP CNSLTJ NEW/EST LOW 30: CPT | Performed by: ORTHOPAEDIC SURGERY

## 2019-05-02 PROCEDURE — 27520 TREAT KNEECAP FRACTURE: CPT | Performed by: ORTHOPAEDIC SURGERY

## 2019-05-02 RX ORDER — OXYCODONE HYDROCHLORIDE AND ACETAMINOPHEN 5; 325 MG/1; MG/1
1 TABLET ORAL EVERY 4 HOURS PRN
COMMUNITY
End: 2020-12-08 | Stop reason: ALTCHOICE

## 2019-06-13 ENCOUNTER — OFFICE VISIT (OUTPATIENT)
Dept: OBGYN CLINIC | Facility: OTHER | Age: 59
End: 2019-06-13

## 2019-06-13 ENCOUNTER — APPOINTMENT (OUTPATIENT)
Dept: RADIOLOGY | Facility: OTHER | Age: 59
End: 2019-06-13
Payer: COMMERCIAL

## 2019-06-13 VITALS
BODY MASS INDEX: 36.32 KG/M2 | HEIGHT: 65 IN | WEIGHT: 218 LBS | DIASTOLIC BLOOD PRESSURE: 87 MMHG | SYSTOLIC BLOOD PRESSURE: 150 MMHG | HEART RATE: 64 BPM

## 2019-06-13 DIAGNOSIS — S82.024D CLOSED NONDISPLACED LONGITUDINAL FRACTURE OF RIGHT PATELLA WITH ROUTINE HEALING, SUBSEQUENT ENCOUNTER: ICD-10-CM

## 2019-06-13 DIAGNOSIS — S82.024D CLOSED NONDISPLACED LONGITUDINAL FRACTURE OF RIGHT PATELLA WITH ROUTINE HEALING, SUBSEQUENT ENCOUNTER: Primary | ICD-10-CM

## 2019-06-13 PROCEDURE — 99024 POSTOP FOLLOW-UP VISIT: CPT | Performed by: PHYSICIAN ASSISTANT

## 2019-06-13 PROCEDURE — 73562 X-RAY EXAM OF KNEE 3: CPT

## 2019-10-22 ENCOUNTER — HOSPITAL ENCOUNTER (OUTPATIENT)
Facility: HOSPITAL | Age: 59
Setting detail: OBSERVATION
Discharge: HOME | End: 2019-10-23
Attending: EMERGENCY MEDICINE | Admitting: SURGERY
Payer: COMMERCIAL

## 2019-10-22 ENCOUNTER — APPOINTMENT (EMERGENCY)
Dept: CT IMAGING | Facility: HOSPITAL | Age: 59
End: 2019-10-22
Payer: COMMERCIAL

## 2019-10-22 DIAGNOSIS — K56.609 BOWEL OBSTRUCTION (HCC): Primary | ICD-10-CM

## 2019-10-22 DIAGNOSIS — Z98.890 S/P EXPLORATORY LAPAROTOMY: ICD-10-CM

## 2019-10-22 DIAGNOSIS — Z12.11 COLON CANCER SCREENING: ICD-10-CM

## 2019-10-22 LAB
ALBUMIN SERPL BCP-MCNC: 3.5 G/DL (ref 3.5–5)
ALP SERPL-CCNC: 115 U/L (ref 46–116)
ALT SERPL W P-5'-P-CCNC: 25 U/L (ref 12–78)
ANION GAP SERPL CALCULATED.3IONS-SCNC: 10 MMOL/L (ref 4–13)
AST SERPL W P-5'-P-CCNC: 24 U/L (ref 5–45)
BASOPHILS # BLD AUTO: 0.04 THOUSANDS/ΜL (ref 0–0.1)
BASOPHILS NFR BLD AUTO: 0 % (ref 0–1)
BILIRUB SERPL-MCNC: 0.2 MG/DL (ref 0.2–1)
BILIRUB UR QL STRIP: NEGATIVE
BUN SERPL-MCNC: 20 MG/DL (ref 5–25)
CALCIUM SERPL-MCNC: 9.1 MG/DL (ref 8.3–10.1)
CHLORIDE SERPL-SCNC: 102 MMOL/L (ref 100–108)
CLARITY UR: CLEAR
CO2 SERPL-SCNC: 28 MMOL/L (ref 21–32)
COLOR UR: YELLOW
CREAT SERPL-MCNC: 0.7 MG/DL (ref 0.6–1.3)
EOSINOPHIL # BLD AUTO: 0 THOUSAND/ΜL (ref 0–0.61)
EOSINOPHIL NFR BLD AUTO: 0 % (ref 0–6)
ERYTHROCYTE [DISTWIDTH] IN BLOOD BY AUTOMATED COUNT: 16.2 % (ref 11.6–15.1)
GFR SERPL CREATININE-BSD FRML MDRD: 95 ML/MIN/1.73SQ M
GLUCOSE SERPL-MCNC: 124 MG/DL (ref 65–140)
GLUCOSE UR STRIP-MCNC: NEGATIVE MG/DL
HCT VFR BLD AUTO: 37.2 % (ref 34.8–46.1)
HGB BLD-MCNC: 11 G/DL (ref 11.5–15.4)
HGB UR QL STRIP.AUTO: NEGATIVE
IMM GRANULOCYTES # BLD AUTO: 0.08 THOUSAND/UL (ref 0–0.2)
IMM GRANULOCYTES NFR BLD AUTO: 1 % (ref 0–2)
KETONES UR STRIP-MCNC: NEGATIVE MG/DL
LACTATE SERPL-SCNC: 2.2 MMOL/L (ref 0.5–2)
LEUKOCYTE ESTERASE UR QL STRIP: NEGATIVE
LIPASE SERPL-CCNC: 103 U/L (ref 73–393)
LYMPHOCYTES # BLD AUTO: 0.82 THOUSANDS/ΜL (ref 0.6–4.47)
LYMPHOCYTES NFR BLD AUTO: 5 % (ref 14–44)
MCH RBC QN AUTO: 22.9 PG (ref 26.8–34.3)
MCHC RBC AUTO-ENTMCNC: 29.6 G/DL (ref 31.4–37.4)
MCV RBC AUTO: 78 FL (ref 82–98)
MONOCYTES # BLD AUTO: 0.39 THOUSAND/ΜL (ref 0.17–1.22)
MONOCYTES NFR BLD AUTO: 3 % (ref 4–12)
NEUTROPHILS # BLD AUTO: 14.43 THOUSANDS/ΜL (ref 1.85–7.62)
NEUTS SEG NFR BLD AUTO: 91 % (ref 43–75)
NITRITE UR QL STRIP: NEGATIVE
NRBC BLD AUTO-RTO: 0 /100 WBCS
PH UR STRIP.AUTO: 6.5 [PH]
PLATELET # BLD AUTO: 339 THOUSANDS/UL (ref 149–390)
PLATELET # BLD AUTO: 384 THOUSANDS/UL (ref 149–390)
PMV BLD AUTO: 10.1 FL (ref 8.9–12.7)
PMV BLD AUTO: 9.9 FL (ref 8.9–12.7)
POTASSIUM SERPL-SCNC: 4.2 MMOL/L (ref 3.5–5.3)
PROT SERPL-MCNC: 8.4 G/DL (ref 6.4–8.2)
PROT UR STRIP-MCNC: NEGATIVE MG/DL
RBC # BLD AUTO: 4.8 MILLION/UL (ref 3.81–5.12)
SODIUM SERPL-SCNC: 140 MMOL/L (ref 136–145)
SP GR UR STRIP.AUTO: <=1.005 (ref 1–1.03)
UROBILINOGEN UR QL STRIP.AUTO: 0.2 E.U./DL
WBC # BLD AUTO: 15.76 THOUSAND/UL (ref 4.31–10.16)

## 2019-10-22 PROCEDURE — 36415 COLL VENOUS BLD VENIPUNCTURE: CPT | Performed by: NURSE PRACTITIONER

## 2019-10-22 PROCEDURE — 83690 ASSAY OF LIPASE: CPT | Performed by: NURSE PRACTITIONER

## 2019-10-22 PROCEDURE — 80053 COMPREHEN METABOLIC PANEL: CPT | Performed by: NURSE PRACTITIONER

## 2019-10-22 PROCEDURE — 81003 URINALYSIS AUTO W/O SCOPE: CPT | Performed by: NURSE PRACTITIONER

## 2019-10-22 PROCEDURE — 85025 COMPLETE CBC W/AUTO DIFF WBC: CPT | Performed by: NURSE PRACTITIONER

## 2019-10-22 PROCEDURE — 74177 CT ABD & PELVIS W/CONTRAST: CPT

## 2019-10-22 PROCEDURE — 99285 EMERGENCY DEPT VISIT HI MDM: CPT

## 2019-10-22 PROCEDURE — 96374 THER/PROPH/DIAG INJ IV PUSH: CPT

## 2019-10-22 PROCEDURE — 99285 EMERGENCY DEPT VISIT HI MDM: CPT | Performed by: NURSE PRACTITIONER

## 2019-10-22 PROCEDURE — 96375 TX/PRO/DX INJ NEW DRUG ADDON: CPT

## 2019-10-22 PROCEDURE — 83605 ASSAY OF LACTIC ACID: CPT | Performed by: NURSE PRACTITIONER

## 2019-10-22 PROCEDURE — 99220 PR INITIAL OBSERVATION CARE/DAY 70 MINUTES: CPT | Performed by: SURGERY

## 2019-10-22 PROCEDURE — 85049 AUTOMATED PLATELET COUNT: CPT | Performed by: SURGERY

## 2019-10-22 PROCEDURE — 96361 HYDRATE IV INFUSION ADD-ON: CPT

## 2019-10-22 RX ORDER — MINERAL OIL 100 G/100G
1 OIL RECTAL ONCE
Status: COMPLETED | OUTPATIENT
Start: 2019-10-22 | End: 2019-10-22

## 2019-10-22 RX ORDER — ONDANSETRON 2 MG/ML
4 INJECTION INTRAMUSCULAR; INTRAVENOUS ONCE
Status: COMPLETED | OUTPATIENT
Start: 2019-10-22 | End: 2019-10-22

## 2019-10-22 RX ORDER — SODIUM CHLORIDE, SODIUM LACTATE, POTASSIUM CHLORIDE, CALCIUM CHLORIDE 600; 310; 30; 20 MG/100ML; MG/100ML; MG/100ML; MG/100ML
125 INJECTION, SOLUTION INTRAVENOUS CONTINUOUS
Status: DISCONTINUED | OUTPATIENT
Start: 2019-10-22 | End: 2019-10-23 | Stop reason: HOSPADM

## 2019-10-22 RX ORDER — ACETAMINOPHEN 325 MG/1
975 TABLET ORAL EVERY 6 HOURS PRN
Status: DISCONTINUED | OUTPATIENT
Start: 2019-10-22 | End: 2019-10-23 | Stop reason: HOSPADM

## 2019-10-22 RX ORDER — MORPHINE SULFATE 10 MG/ML
6 INJECTION, SOLUTION INTRAMUSCULAR; INTRAVENOUS ONCE
Status: DISCONTINUED | OUTPATIENT
Start: 2019-10-22 | End: 2019-10-22

## 2019-10-22 RX ORDER — HEPARIN SODIUM 5000 [USP'U]/ML
5000 INJECTION, SOLUTION INTRAVENOUS; SUBCUTANEOUS EVERY 8 HOURS SCHEDULED
Status: DISCONTINUED | OUTPATIENT
Start: 2019-10-22 | End: 2019-10-23 | Stop reason: HOSPADM

## 2019-10-22 RX ADMIN — IOHEXOL 50 ML: 240 INJECTION, SOLUTION INTRATHECAL; INTRAVASCULAR; INTRAVENOUS; ORAL at 07:07

## 2019-10-22 RX ADMIN — SODIUM CHLORIDE, SODIUM LACTATE, POTASSIUM CHLORIDE, AND CALCIUM CHLORIDE 125 ML/HR: .6; .31; .03; .02 INJECTION, SOLUTION INTRAVENOUS at 15:07

## 2019-10-22 RX ADMIN — SODIUM CHLORIDE, SODIUM LACTATE, POTASSIUM CHLORIDE, AND CALCIUM CHLORIDE 1000 ML: .6; .31; .03; .02 INJECTION, SOLUTION INTRAVENOUS at 12:23

## 2019-10-22 RX ADMIN — IOHEXOL 100 ML: 350 INJECTION, SOLUTION INTRAVENOUS at 08:32

## 2019-10-22 RX ADMIN — SODIUM CHLORIDE 1000 ML: 0.9 INJECTION, SOLUTION INTRAVENOUS at 07:05

## 2019-10-22 RX ADMIN — HEPARIN SODIUM 5000 UNITS: 5000 INJECTION INTRAVENOUS; SUBCUTANEOUS at 15:09

## 2019-10-22 RX ADMIN — MINERAL OIL 1 ENEMA: 100 ENEMA RECTAL at 15:09

## 2019-10-22 RX ADMIN — HEPARIN SODIUM 5000 UNITS: 5000 INJECTION INTRAVENOUS; SUBCUTANEOUS at 21:43

## 2019-10-22 RX ADMIN — ONDANSETRON 4 MG: 2 INJECTION INTRAMUSCULAR; INTRAVENOUS at 07:05

## 2019-10-22 RX ADMIN — ACETAMINOPHEN 975 MG: 325 TABLET, FILM COATED ORAL at 15:07

## 2019-10-22 RX ADMIN — SODIUM CHLORIDE, SODIUM LACTATE, POTASSIUM CHLORIDE, AND CALCIUM CHLORIDE 125 ML/HR: .6; .31; .03; .02 INJECTION, SOLUTION INTRAVENOUS at 23:09

## 2019-10-22 NOTE — ED PROVIDER NOTES
History  Chief Complaint   Patient presents with    Abdominal Pain     Per Pt " I've been having abd pain since last night  It ook vicodin and morphine this morning but it was not effective " Pt c/o nuasea, chills and sweats  54-year-old female presents here with a chief complaint of upper abdominal pain  She reports that the upper abdominal pain started around 8:00 p m  Last night  Pain is persisted since that time despite her taking a few of her 's pain medications  She reports that she continues to have a bowel movement this morning she has some queasiness but really has not had any appetite at this point  Abdominal Pain   Pain location:  Epigastric  Pain quality: cramping    Pain severity:  Mild  Onset quality:  Gradual  Duration:  2 days  Timing:  Constant  Context: not suspicious food intake    Associated symptoms: no chest pain, no cough, no diarrhea, no dysuria, no fatigue, no fever, no shortness of breath, no sore throat and no vomiting        Prior to Admission Medications   Prescriptions Last Dose Informant Patient Reported? Taking?    Multiple Vitamins-Minerals (MULTIVITAMIN WITH MINERALS) tablet  Self Yes No   Sig: Take 1 tablet by mouth daily   oxyCODONE-acetaminophen (PERCOCET) 5-325 mg per tablet  Self Yes No   Sig: Take 1 tablet by mouth every 4 (four) hours as needed for moderate pain      Facility-Administered Medications: None       Past Medical History:   Diagnosis Date    Disease of thyroid gland     Hypertension        Past Surgical History:   Procedure Laterality Date     SECTION      GASTRIC BYPASS      VA LAP,DIAGNOSTIC ABDOMEN N/A 2019    Procedure: LAPAROSCOPY DIAGNOSTIC CONVERTED TO OPEN; SMALL BOWEL RESECTION; LYSIS OF ADHESIONS;  Surgeon: Indira Tapia MD;  Location: [facility];  Service: General       Family History   Problem Relation Age of Onset    Hypertension Mother     COPD Father      I have reviewed and agree with the history as documented  Social History     Tobacco Use    Smoking status: Never Smoker    Smokeless tobacco: Never Used   Substance Use Topics    Alcohol use: No    Drug use: No        Review of Systems   Constitutional: Negative for diaphoresis, fatigue and fever  HENT: Negative for congestion, ear pain, nosebleeds and sore throat  Eyes: Negative for photophobia, pain, discharge and visual disturbance  Respiratory: Negative for cough, choking, chest tightness, shortness of breath and wheezing  Cardiovascular: Negative for chest pain and palpitations  Gastrointestinal: Positive for abdominal pain  Negative for abdominal distention, diarrhea and vomiting  Genitourinary: Negative for dysuria, flank pain and frequency  Musculoskeletal: Negative for back pain, gait problem and joint swelling  Skin: Negative for color change and rash  Neurological: Negative for dizziness, syncope and headaches  Psychiatric/Behavioral: Negative for behavioral problems and confusion  The patient is not nervous/anxious  All other systems reviewed and are negative  Physical Exam  Physical Exam   Constitutional: She is oriented to person, place, and time  She appears well-developed and well-nourished  She is cooperative  Non-toxic appearance  She does not have a sickly appearance  She does not appear ill  No distress  HENT:   Head: Normocephalic and atraumatic  Right Ear: Tympanic membrane and external ear normal    Left Ear: Tympanic membrane and external ear normal    Nose: No rhinorrhea, sinus tenderness or nasal deformity  No epistaxis  Right sinus exhibits no maxillary sinus tenderness and no frontal sinus tenderness  Left sinus exhibits no maxillary sinus tenderness and no frontal sinus tenderness  Mouth/Throat: Oropharynx is clear and moist and mucous membranes are normal  Normal dentition  Eyes: Pupils are equal, round, and reactive to light  EOM are normal    Neck: Normal range of motion   Neck supple  Cardiovascular: Normal rate, regular rhythm and normal heart sounds  No murmur heard  Pulmonary/Chest: Effort normal and breath sounds normal  No accessory muscle usage  No respiratory distress  She has no wheezes  She has no rales  She exhibits no tenderness  Abdominal: Soft  She exhibits no distension  There is tenderness in the epigastric area  There is no guarding and no CVA tenderness  Musculoskeletal: Normal range of motion  She exhibits no edema or tenderness  Lymphadenopathy:     She has no cervical adenopathy  Neurological: She is alert and oriented to person, place, and time  She exhibits normal muscle tone  Skin: Skin is warm and dry  No rash noted  No erythema  Psychiatric: She has a normal mood and affect  Nursing note and vitals reviewed        Vital Signs  ED Triage Vitals   Temperature Pulse Respirations Blood Pressure SpO2   10/22/19 0631 10/22/19 0629 10/22/19 0629 10/22/19 0629 10/22/19 0629   98 2 °F (36 8 °C) 87 20 (!) 161/118 99 %      Temp Source Heart Rate Source Patient Position - Orthostatic VS BP Location FiO2 (%)   10/22/19 0631 10/22/19 0629 10/22/19 0629 10/22/19 0629 --   Oral Monitor Lying Right arm       Pain Score       10/22/19 0629       7           Vitals:    10/22/19 0629 10/22/19 0816 10/22/19 1047   BP: (!) 161/118 155/79 144/62   Pulse: 87 86 94   Patient Position - Orthostatic VS: Lying Sitting Sitting         Visual Acuity      ED Medications  Medications   lactated ringers bolus 1,000 mL (1,000 mL Intravenous New Bag 10/22/19 1223)   lactated ringers infusion (has no administration in time range)   heparin (porcine) subcutaneous injection 5,000 Units (has no administration in time range)   mineral oil enema 1 enema (has no administration in time range)   sodium chloride 0 9 % bolus 1,000 mL (0 mL Intravenous Stopped 10/22/19 1006)   ondansetron (ZOFRAN) injection 4 mg (4 mg Intravenous Given 10/22/19 0705)   iohexol (OMNIPAQUE) 240 MG/ML solution 50 mL (50 mL Oral Given 10/22/19 0707)   iohexol (OMNIPAQUE) 350 MG/ML injection (SINGLE-DOSE) 100 mL (100 mL Intravenous Given 10/22/19 4706)       Diagnostic Studies  Results Reviewed     Procedure Component Value Units Date/Time    Platelet count [[id number]]     Lab Status:  No result Specimen:  Blood     UA w Reflex to Microscopic w Reflex to Culture [[id number]] Collected:  10/22/19 0851    Lab Status:  Final result Specimen:  Urine, Clean Catch Updated:  10/22/19 0859     Color, UA Yellow     Clarity, UA Clear     Specific Gravity, UA <=1 005     pH, UA 6 5     Leukocytes, UA Negative     Nitrite, UA Negative     Protein, UA Negative mg/dl      Glucose, UA Negative mg/dl      Ketones, UA Negative mg/dl      Urobilinogen, UA 0 2 E U /dl      Bilirubin, UA Negative     Blood, UA Negative    Lactic acid, plasma [[id number]]  (Abnormal) Collected:  10/22/19 0704    Lab Status:  Final result Specimen:  Blood from Arm, Right Updated:  10/22/19 0747     LACTIC ACID 2 2 mmol/L     Narrative:       Result may be elevated if tourniquet was used during collection      Lipase [[id number]]  (Normal) Collected:  10/22/19 0704    Lab Status:  Final result Specimen:  Blood from Arm, Right Updated:  10/22/19 0738     Lipase 103 u/L     Comprehensive metabolic panel [[id number]]  (Abnormal) Collected:  10/22/19 0704    Lab Status:  Final result Specimen:  Blood from Arm, Right Updated:  10/22/19 0738     Sodium 140 mmol/L      Potassium 4 2 mmol/L      Chloride 102 mmol/L      CO2 28 mmol/L      ANION GAP 10 mmol/L      BUN 20 mg/dL      Creatinine 0 70 mg/dL      Glucose 124 mg/dL      Calcium 9 1 mg/dL      AST 24 U/L      ALT 25 U/L      Alkaline Phosphatase 115 U/L      Total Protein 8 4 g/dL      Albumin 3 5 g/dL      Total Bilirubin 0 20 mg/dL      eGFR 95 ml/min/1 73sq m     Narrative:       [place] guidelines for Chronic Kidney Disease (CKD):     Stage 1 with normal or high GFR (GFR > 90 mL/min/1 73 square meters)    Stage 2 Mild CKD (GFR = 60-89 mL/min/1 73 square meters)    Stage 3A Moderate CKD (GFR = 45-59 mL/min/1 73 square meters)    Stage 3B Moderate CKD (GFR = 30-44 mL/min/1 73 square meters)    Stage 4 Severe CKD (GFR = 15-29 mL/min/1 73 square meters)    Stage 5 End Stage CKD (GFR <15 mL/min/1 73 square meters)  Note: GFR calculation is accurate only with a steady state creatinine    CBC and differential [[id number]]  (Abnormal) Collected:  10/22/19 0704    Lab Status:  Final result Specimen:  Blood from Arm, Right Updated:  10/22/19 0724     WBC 15 76 Thousand/uL      RBC 4 80 Million/uL      Hemoglobin 11 0 g/dL      Hematocrit 37 2 %      MCV 78 fL      MCH 22 9 pg      MCHC 29 6 g/dL      RDW 16 2 %      MPV 9 9 fL      Platelets 114 Thousands/uL      nRBC 0 /100 WBCs      Neutrophils Relative 91 %      Immat GRANS % 1 %      Lymphocytes Relative 5 %      Monocytes Relative 3 %      Eosinophils Relative 0 %      Basophils Relative 0 %      Neutrophils Absolute 14 43 Thousands/µL      Immature Grans Absolute 0 08 Thousand/uL      Lymphocytes Absolute 0 82 Thousands/µL      Monocytes Absolute 0 39 Thousand/µL      Eosinophils Absolute 0 00 Thousand/µL      Basophils Absolute 0 04 Thousands/µL                  CT abdomen pelvis with contrast   Final Result by Brendan Liu MD (10/22 5107)   Findings suggest small bowel obstruction probably at the level of jejunojejunostomy anastomosis  Mild infiltration adjacent to the anastomosis noted   Dilated small bowel loops in the upper and mid abdomen       Postsurgical changes from gastric bypass with expected the size of the gastric pouch however there is air and fluid in the excluded stomach, consider nonemergent evaluation to evaluate for gastric gastric fistula        Enteric contrast is also seen within the biliary pancreatic limb, this may be related to presence of Gastrogastric fistula or may be due to reflux of the contrast up the biliary pancreatic limb      Calcification in the gallbladder consider nonemergent gallbladder ultrasound to evaluate for a fixed calcified lesion versus  free floating stones  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 ,  Stable since the previous study of January 10, 2019  Continued surveillance suggested with follow-up at 6 months   Follow-up notification has been created in the Epic          I personally discussed this study with Jeyson Sarbjit on 10/22/2019 at 9:41 AM                            Workstation performed: [id number]         XR abdomen obstruction series    (Results Pending)              Procedures  Procedures       ED Course                               MDM  Number of Diagnoses or Management Options  Bowel obstruction [facility]): new and requires workup     Amount and/or Complexity of Data Reviewed  Clinical lab tests: ordered and reviewed  Tests in the radiology section of CPT®: reviewed and ordered  Review and summarize past medical records: yes  Independent visualization of images, tracings, or specimens: yes    Patient Progress  Patient progress: stable      Disposition  Final diagnoses: Bowel obstruction ([address] )     Time reflects when diagnosis was documented in both MDM as applicable and the Disposition within this note     Time User Action Codes Description Comment    10/22/2019 12:36 PM Xena Quintana Add [K56 609] Bowel obstruction [facility])       ED Disposition     ED Disposition Condition Date/Time Comment    Admit Stable Tue Oct 22, 2019 12:36 PM Case was discussed with Marta Rosario and the patient's admission status was agreed to be Admission Status: inpatient status to the service of Dr Marta Rosario   Follow-up Information    None         Patient's Medications   Discharge Prescriptions    No medications on file     No discharge procedures on file      ED Provider  Electronically Signed by           FINESSE Simons  10/22/19 8801

## 2019-10-22 NOTE — ED NOTES
1  CC-abd pain since last night  Hx gastric bypass  2  Orientation status-A&OX4    3  Abnormal labs/ abnormal focused assessment/ abnormal vitals- BP elevated  Elevated WBC and lactic acid    4  Medications/drips-lactated ringers bolus    5  Last time narcotics given-n/a    6  IV lines/drains/etc-20G R AC    7  Isolation status-n/a    8  Skin-intact    9  Ambulation-independent    10  ED nurse's phone number-[phone]  11   Admission related to traumatic injury-no           Vahid Shen RN  10/22/19 1002 Chinedu Culver RN  10/22/19 9051

## 2019-10-22 NOTE — ED NOTES
bariatric surgeons at bedside     Kirsten Pike, [address] [facility]  10/22/19 7279

## 2019-10-22 NOTE — PLAN OF CARE
Problem: PAIN - ADULT  Goal: Verbalizes/displays adequate comfort level or baseline comfort level  Description  Interventions:  - Encourage patient to monitor pain and request assistance  - Assess pain using appropriate pain scale  - Administer analgesics based on type and severity of pain and evaluate response  - Implement non-pharmacological measures as appropriate and evaluate response  - Consider cultural and social influences on pain and pain management  - Notify physician/advanced practitioner if interventions unsuccessful or patient reports new pain  Outcome: Progressing     Problem: INFECTION - ADULT  Goal: Absence or prevention of progression during hospitalization  Description  INTERVENTIONS:  - Assess and monitor for signs and symptoms of infection  - Monitor lab/diagnostic results  - Monitor all insertion sites, i e  indwelling lines, tubes, and drains  - Monitor endotracheal if appropriate and nasal secretions for changes in amount and color  - [place] appropriate cooling/warming therapies per order  - Administer medications as ordered  - Instruct and encourage patient and family to use good hand hygiene technique  - Identify and instruct in appropriate isolation precautions for identified infection/condition  Outcome: Progressing  Goal: Absence of fever/infection during neutropenic period  Description  INTERVENTIONS:  - Monitor WBC    Outcome: Progressing     Problem: SAFETY ADULT  Goal: Patient will remain free of falls  Description  INTERVENTIONS:  - Assess patient frequently for physical needs  -  Identify cognitive and physical deficits and behaviors that affect risk of falls    -  [place] fall precautions as indicated by assessment   - Educate patient/family on patient safety including physical limitations  - Instruct patient to call for assistance with activity based on assessment  - Modify environment to reduce risk of injury  - Consider OT/PT consult to assist with strengthening/mobility  Outcome: Progressing  Goal: Maintain or return to baseline ADL function  Description  INTERVENTIONS:  -  Assess patient's ability to carry out ADLs; assess patient's baseline for ADL function and identify physical deficits which impact ability to perform ADLs (bathing, care of mouth/teeth, toileting, grooming, dressing, etc )  - Assess/evaluate cause of self-care deficits   - Assess range of motion  - Assess patient's mobility; develop plan if impaired  - Assess patient's need for assistive devices and provide as appropriate  - Encourage maximum independence but intervene and supervise when necessary  - Involve family in performance of ADLs  - Assess for home care needs following discharge   - Consider OT consult to assist with ADL evaluation and planning for discharge  - Provide patient education as appropriate  Outcome: Progressing  Goal: Maintain or return mobility status to optimal level  Description  INTERVENTIONS:  - Assess patient's baseline mobility status (ambulation, transfers, stairs, etc )    - Identify cognitive and physical deficits and behaviors that affect mobility  - Identify mobility aids required to assist with transfers and/or ambulation (gait belt, sit-to-stand, lift, walker, cane, etc )  - [place] fall precautions as indicated by assessment  - Record patient progress and toleration of activity level on Mobility SBAR; progress patient to next Phase/Stage  - Instruct patient to call for assistance with activity based on assessment  - Consider rehabilitation consult to assist with strengthening/weightbearing, etc   Outcome: Progressing     Problem: DISCHARGE PLANNING  Goal: Discharge to home or other facility with appropriate resources  Description  INTERVENTIONS:  - Identify barriers to discharge w/patient and caregiver  - Arrange for needed discharge resources and transportation as appropriate  - Identify discharge learning needs (meds, wound care, etc )  - Arrange for interpretive services to assist at discharge as needed  - Refer to Case Management Department for coordinating discharge planning if the patient needs post-hospital services based on physician/advanced practitioner order or complex needs related to functional status, cognitive ability, or social support system  Outcome: Progressing     Problem: Knowledge Deficit  Goal: Patient/family/caregiver demonstrates understanding of disease process, treatment plan, medications, and discharge instructions  Description  Complete learning assessment and assess knowledge base    Interventions:  - Provide teaching at level of understanding  - Provide teaching via preferred learning methods  Outcome: Progressing

## 2019-10-22 NOTE — PLAN OF CARE
Problem: PAIN - ADULT  Goal: Verbalizes/displays adequate comfort level or baseline comfort level  Description  Interventions:  - Encourage patient to monitor pain and request assistance  - Assess pain using appropriate pain scale  - Administer analgesics based on type and severity of pain and evaluate response  - Implement non-pharmacological measures as appropriate and evaluate response  - Consider cultural and social influences on pain and pain management  - Notify physician/advanced practitioner if interventions unsuccessful or patient reports new pain  Outcome: Progressing     Problem: INFECTION - ADULT  Goal: Absence or prevention of progression during hospitalization  Description  INTERVENTIONS:  - Assess and monitor for signs and symptoms of infection  - Monitor lab/diagnostic results  - Monitor all insertion sites, i e  indwelling lines, tubes, and drains  - Monitor endotracheal if appropriate and nasal secretions for changes in amount and color  - [place] appropriate cooling/warming therapies per order  - Administer medications as ordered  - Instruct and encourage patient and family to use good hand hygiene technique  - Identify and instruct in appropriate isolation precautions for identified infection/condition  Outcome: Progressing  Goal: Absence of fever/infection during neutropenic period  Description  INTERVENTIONS:  - Monitor WBC    Outcome: Progressing     Problem: SAFETY ADULT  Goal: Patient will remain free of falls  Description  INTERVENTIONS:  - Assess patient frequently for physical needs  -  Identify cognitive and physical deficits and behaviors that affect risk of falls    -  [place] fall precautions as indicated by assessment   - Educate patient/family on patient safety including physical limitations  - Instruct patient to call for assistance with activity based on assessment  - Modify environment to reduce risk of injury  - Consider OT/PT consult to assist with strengthening/mobility  Outcome: Progressing  Goal: Maintain or return to baseline ADL function  Description  INTERVENTIONS:  -  Assess patient's ability to carry out ADLs; assess patient's baseline for ADL function and identify physical deficits which impact ability to perform ADLs (bathing, care of mouth/teeth, toileting, grooming, dressing, etc )  - Assess/evaluate cause of self-care deficits   - Assess range of motion  - Assess patient's mobility; develop plan if impaired  - Assess patient's need for assistive devices and provide as appropriate  - Encourage maximum independence but intervene and supervise when necessary  - Involve family in performance of ADLs  - Assess for home care needs following discharge   - Consider OT consult to assist with ADL evaluation and planning for discharge  - Provide patient education as appropriate  Outcome: Progressing  Goal: Maintain or return mobility status to optimal level  Description  INTERVENTIONS:  - Assess patient's baseline mobility status (ambulation, transfers, stairs, etc )    - Identify cognitive and physical deficits and behaviors that affect mobility  - Identify mobility aids required to assist with transfers and/or ambulation (gait belt, sit-to-stand, lift, walker, cane, etc )  - [place] fall precautions as indicated by assessment  - Record patient progress and toleration of activity level on Mobility SBAR; progress patient to next Phase/Stage  - Instruct patient to call for assistance with activity based on assessment  - Consider rehabilitation consult to assist with strengthening/weightbearing, etc   Outcome: Progressing     Problem: DISCHARGE PLANNING  Goal: Discharge to home or other facility with appropriate resources  Description  INTERVENTIONS:  - Identify barriers to discharge w/patient and caregiver  - Arrange for needed discharge resources and transportation as appropriate  - Identify discharge learning needs (meds, wound care, etc )  - Arrange for interpretive services to assist at discharge as needed  - Refer to Case Management Department for coordinating discharge planning if the patient needs post-hospital services based on physician/advanced practitioner order or complex needs related to functional status, cognitive ability, or social support system  Outcome: Progressing     Problem: Knowledge Deficit  Goal: Patient/family/caregiver demonstrates understanding of disease process, treatment plan, medications, and discharge instructions  Description  Complete learning assessment and assess knowledge base    Interventions:  - Provide teaching at level of understanding  - Provide teaching via preferred learning methods  Outcome: Progressing

## 2019-10-22 NOTE — H&P
H&P Exam - Bariatric Surgery   Montserrat Correa 61 y o  female MRN: [id number]  Unit/Bed#: ED 14 Encounter: [id number]    Assessment/Plan     Assessment:  59/F h/o LRYGB s/p open SBR  secondary to pelvic adhesions and necrotic small bowel now with SBO likely secondary to adhesive disease     Plan:  -Admit for observation  -NPO  -IVF  -OOB/ambulate   -DVT ppx   -IS  -Rpt Labs and lactate tomorrow morning  -Obstruction series tomorrow morning   -If labs improve, obstruction series demonstrates contrast in colon with resolution or improvement of SBO and pt has ongoing flatus then will trial a diet tomorrow  -If any clinical deterioration will need operative intervention  Patient and  understand plan well    History of Present Illness     HPI:  Montserrat Correa is a 61 y o  female who presents with a small bowel obstruction s/p LRYGB  She has been having intermittent cramping abdominal pain (about 4-5 episodes since January though  states more)  The pain always subsided by itself the next morning  Yesterday she began having the same periumbilical intermittent cramping pain however the pain persisted  She took a percocet and a morphine tablet (at separate times) and this brought no relief  At 0400 she decided to go to the ER  After being in the ER for sometime her pain resolved  She states she is passing gas and she had two bowel movements yesterday  She has had no bowel movements today  At the present moment she denies any pain  Review of Systems   Constitutional: Positive for chills and diaphoresis  Gastrointestinal: Positive for constipation  All other systems reviewed and are negative        Historical Information   Past Medical History:   Diagnosis Date    Disease of thyroid gland     Hypertension      Past Surgical History:   Procedure Laterality Date     SECTION      GASTRIC BYPASS      [place] LAP,DIAGNOSTIC ABDOMEN N/A 2019    Procedure: LAPAROSCOPY DIAGNOSTIC CONVERTED TO OPEN; SMALL BOWEL RESECTION; LYSIS OF ADHESIONS;  Surgeon: Marisela Garrido MD;  Location: [facility];  Service: General     Social History   Social History     Substance and Sexual Activity   Alcohol Use No     Social History     Substance and Sexual Activity   Drug Use No     Social History     Tobacco Use   Smoking Status Never Smoker   Smokeless Tobacco Never Used     Family History: non-contributory    Meds/Allergies   all medications and allergies reviewed  No Known Allergies    Objective   First Vitals:   Blood Pressure: (!) 161/118 (10/22/19 0629)  Pulse: 87 (10/22/19 0629)  Temperature: 98 2 °F (36 8 °C) (10/22/19 0631)  Temp Source: Oral (10/22/19 0631)  Respirations: 20 (10/22/19 0629)  Weight - Scale: 109 kg (240 lb 4 8 oz) (10/22/19 0629)  SpO2: 99 % (10/22/19 0629)    Current Vitals:   Blood Pressure: 144/62 (10/22/19 1047)  Pulse: 94 (10/22/19 1047)  Temperature: 98 2 °F (36 8 °C) (10/22/19 0631)  Temp Source: Oral (10/22/19 0631)  Respirations: 19 (10/22/19 1047)  Weight - Scale: 109 kg (240 lb 4 8 oz) (10/22/19 0629)  SpO2: 98 % (10/22/19 1047)      Intake/Output Summary (Last 24 hours) at 10/22/2019 1225  Last data filed at 10/22/2019 1006  Gross per 24 hour   Intake 1000 ml   Output    Net 1000 ml       Physical Exam   Constitutional: She is oriented to person, place, and time  She appears well-developed and well-nourished  HENT:   Head: Atraumatic  Eyes: EOM are normal    Neck: Neck supple  Cardiovascular: Normal rate  Pulmonary/Chest: Effort normal    Abdominal: Soft  She exhibits no distension  There is no tenderness  Musculoskeletal: Normal range of motion  Neurological: She is alert and oriented to person, place, and time  Skin: Skin is warm and dry  Psychiatric: She has a normal mood and affect  Her behavior is normal    Vitals reviewed        Lab Results:   CBC:   Lab Results   Component Value Date    WBC 15 76 (H) 10/22/2019    HGB 11 0 (L) 10/22/2019    HCT 37 2 10/22/2019 MCV 78 (L) 10/22/2019     10/22/2019    MCH 22 9 (L) 10/22/2019    MCHC 29 6 (L) 10/22/2019    RDW 16 2 (H) 10/22/2019    MPV 9 9 10/22/2019    NRBC 0 10/22/2019   , CMP:   Lab Results   Component Value Date    SODIUM 140 10/22/2019    K 4 2 10/22/2019     10/22/2019    CO2 28 10/22/2019    BUN 20 10/22/2019    CREATININE 0 70 10/22/2019    CALCIUM 9 1 10/22/2019    AST 24 10/22/2019    ALT 25 10/22/2019    ALKPHOS 115 10/22/2019    EGFR 95 10/22/2019     Imaging: I have personally reviewed pertinent reports  CT ABDOMEN AND PELVIS WITH IV CONTRAST     INDICATION:   upper abdominal pain      COMPARISON:  January 20, 2019     TECHNIQUE:  CT examination of the abdomen and pelvis was performed  Axial, sagittal, and coronal 2D reformatted images were created from the source data and submitted for interpretation      Radiation dose length product (DLP) for this visit:  8522 mGy-cm   This examination, like all CT scans performed in the [facility], was performed utilizing techniques to minimize radiation dose exposure, including the use of iterative   reconstruction and automated exposure control      IV Contrast:  100 mL of iohexol (OMNIPAQUE)  Enteric Contrast:  Enteric contrast was not administered      FINDINGS:     ABDOMEN     LOWER CHEST:  No clinically significant abnormality identified in the visualized lower chest      LIVER/BILIARY TREE:  Mild prominence of the intrahepatic ducts seen      GALLBLADDER:  Calcification seen within the gallbladder lumen and along the wall    These were also noted on the previous study     SPLEEN:  Unremarkable      PANCREAS:  Unremarkable      ADRENAL GLANDS:  Unremarkable      KIDNEYS/URETERS:  A mildly septated the cyst seen in the upper pole of the left kidney measuring about 2 cm  Mild prominence of the both renal pelvises noted without ureteral dilation     STOMACH AND BOWEL:  Postsurgical changes are seen from gastric bypass  Fluid and air is noted within the excluded stomach  The proximal portion of the [place] limb is of normal caliber  The small bowel loops adjacent to the anastomosis are dilated  The juxta anastomotic loop demonstrate fecalisation of its content compatible   with prolonged transit      Contrast is also seen opacifying the part of the biliopancreatic the limb  Mild dilation of the biliopancreatic limb loops is also noted     The distal ileal loop are of normal size     The terminal ileum is of normal caliber and demonstrates some fecalization     APPENDIX:  No findings to suggest appendicitis      ABDOMINOPELVIC CAVITY:  No ascites or free intraperitoneal air  No lymphadenopathy      VESSELS:  The celiac trunk, SMA are patent  The KRISTIN and the iliac vessels appear unremarkable     PELVIS     REPRODUCTIVE ORGANS:  No pelvic adnexal mass seen     URINARY BLADDER:  The urinary bladder is distended      ABDOMINAL WALL/INGUINAL REGIONS:  There is a ventral fat-containing hernia to the right of midline in the lower to mid anterior abdominal wall     OSSEOUS STRUCTURES:  No acute compression collapse is  No gross lytic lesions     IMPRESSION:  Findings suggest small bowel obstruction probably at the level of jejunojejunostomy anastomosis      Mild infiltration adjacent to the anastomosis noted  Dilated small bowel loops in the upper and mid abdomen      Postsurgical changes from gastric bypass with expected the size of the gastric pouch however there is air and fluid in the excluded stomach, consider nonemergent evaluation to evaluate for gastric gastric fistula      Enteric contrast is also seen within the biliary pancreatic limb, this may be related to presence of Gastrogastric fistula or may be due to reflux of the contrast up the biliary pancreatic limb     Calcification in the gallbladder consider nonemergent gallbladder ultrasound to evaluate for a fixed calcified lesion versus  free floating stones      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 ,  Stable since the previous study of January 10, 2019  Continued surveillance suggested with follow-up at 6 months  Follow-up notification has been created in the Epic        I personally discussed this study with Sly Vazquez on 10/22/2019 at 9:41 AM      EKG, Pathology, and Other Studies: I have personally reviewed pertinent reports  Code Status: Prior      Counseling / Coordination of Care  Total floor / unit time spent today 30 minutes  Greater than 50% of total time was spent with the patient and / or family counseling and / or coordination of care

## 2019-10-23 ENCOUNTER — APPOINTMENT (OUTPATIENT)
Dept: RADIOLOGY | Facility: HOSPITAL | Age: 59
End: 2019-10-23
Payer: COMMERCIAL

## 2019-10-23 VITALS
OXYGEN SATURATION: 98 % | SYSTOLIC BLOOD PRESSURE: 153 MMHG | HEART RATE: 69 BPM | BODY MASS INDEX: 40.04 KG/M2 | RESPIRATION RATE: 18 BRPM | HEIGHT: 65 IN | TEMPERATURE: 98.2 F | WEIGHT: 240.3 LBS | DIASTOLIC BLOOD PRESSURE: 95 MMHG

## 2019-10-23 DIAGNOSIS — D50.9 IRON DEFICIENCY ANEMIA: ICD-10-CM

## 2019-10-23 DIAGNOSIS — K91.2 POSTSURGICAL MALABSORPTION: Primary | ICD-10-CM

## 2019-10-23 LAB
ANION GAP SERPL CALCULATED.3IONS-SCNC: 8 MMOL/L (ref 4–13)
BASOPHILS # BLD AUTO: 0.03 THOUSANDS/ΜL (ref 0–0.1)
BASOPHILS NFR BLD AUTO: 0 % (ref 0–1)
BUN SERPL-MCNC: 12 MG/DL (ref 5–25)
CALCIUM SERPL-MCNC: 8.6 MG/DL (ref 8.3–10.1)
CHLORIDE SERPL-SCNC: 107 MMOL/L (ref 100–108)
CO2 SERPL-SCNC: 27 MMOL/L (ref 21–32)
CREAT SERPL-MCNC: 0.6 MG/DL (ref 0.6–1.3)
EOSINOPHIL # BLD AUTO: 0.09 THOUSAND/ΜL (ref 0–0.61)
EOSINOPHIL NFR BLD AUTO: 1 % (ref 0–6)
ERYTHROCYTE [DISTWIDTH] IN BLOOD BY AUTOMATED COUNT: 16.6 % (ref 11.6–15.1)
GFR SERPL CREATININE-BSD FRML MDRD: 100 ML/MIN/1.73SQ M
GLUCOSE P FAST SERPL-MCNC: 83 MG/DL (ref 65–99)
GLUCOSE SERPL-MCNC: 83 MG/DL (ref 65–140)
HCT VFR BLD AUTO: 30.2 % (ref 34.8–46.1)
HGB BLD-MCNC: 9.3 G/DL (ref 11.5–15.4)
IMM GRANULOCYTES # BLD AUTO: 0.02 THOUSAND/UL (ref 0–0.2)
IMM GRANULOCYTES NFR BLD AUTO: 0 % (ref 0–2)
LACTATE SERPL-SCNC: 0.5 MMOL/L (ref 0.5–2)
LYMPHOCYTES # BLD AUTO: 2.78 THOUSANDS/ΜL (ref 0.6–4.47)
LYMPHOCYTES NFR BLD AUTO: 41 % (ref 14–44)
MAGNESIUM SERPL-MCNC: 1.9 MG/DL (ref 1.6–2.6)
MCH RBC QN AUTO: 23.9 PG (ref 26.8–34.3)
MCHC RBC AUTO-ENTMCNC: 30.8 G/DL (ref 31.4–37.4)
MCV RBC AUTO: 78 FL (ref 82–98)
MONOCYTES # BLD AUTO: 0.44 THOUSAND/ΜL (ref 0.17–1.22)
MONOCYTES NFR BLD AUTO: 7 % (ref 4–12)
NEUTROPHILS # BLD AUTO: 3.46 THOUSANDS/ΜL (ref 1.85–7.62)
NEUTS SEG NFR BLD AUTO: 51 % (ref 43–75)
NRBC BLD AUTO-RTO: 0 /100 WBCS
PHOSPHATE SERPL-MCNC: 3.9 MG/DL (ref 2.7–4.5)
PLATELET # BLD AUTO: 265 THOUSANDS/UL (ref 149–390)
PMV BLD AUTO: 9.7 FL (ref 8.9–12.7)
POTASSIUM SERPL-SCNC: 3.6 MMOL/L (ref 3.5–5.3)
RBC # BLD AUTO: 3.89 MILLION/UL (ref 3.81–5.12)
SODIUM SERPL-SCNC: 142 MMOL/L (ref 136–145)
WBC # BLD AUTO: 6.82 THOUSAND/UL (ref 4.31–10.16)

## 2019-10-23 PROCEDURE — 74022 RADEX COMPL AQT ABD SERIES: CPT

## 2019-10-23 PROCEDURE — 99225 PR SBSQ OBSERVATION CARE/DAY 25 MINUTES: CPT | Performed by: PHYSICIAN ASSISTANT

## 2019-10-23 PROCEDURE — 83605 ASSAY OF LACTIC ACID: CPT | Performed by: SURGERY

## 2019-10-23 PROCEDURE — 80048 BASIC METABOLIC PNL TOTAL CA: CPT | Performed by: SURGERY

## 2019-10-23 PROCEDURE — 84100 ASSAY OF PHOSPHORUS: CPT | Performed by: SURGERY

## 2019-10-23 PROCEDURE — 83735 ASSAY OF MAGNESIUM: CPT | Performed by: SURGERY

## 2019-10-23 PROCEDURE — 85025 COMPLETE CBC W/AUTO DIFF WBC: CPT | Performed by: SURGERY

## 2019-10-23 RX ADMIN — ACETAMINOPHEN 975 MG: 325 TABLET, FILM COATED ORAL at 06:04

## 2019-10-23 RX ADMIN — HEPARIN SODIUM 5000 UNITS: 5000 INJECTION INTRAVENOUS; SUBCUTANEOUS at 06:04

## 2019-10-23 RX ADMIN — SODIUM CHLORIDE, SODIUM LACTATE, POTASSIUM CHLORIDE, AND CALCIUM CHLORIDE 125 ML/HR: .6; .31; .03; .02 INJECTION, SOLUTION INTRAVENOUS at 06:55

## 2019-10-23 NOTE — PLAN OF CARE
Problem: PAIN - ADULT  Goal: Verbalizes/displays adequate comfort level or baseline comfort level  Description  Interventions:  - Encourage patient to monitor pain and request assistance  - Assess pain using appropriate pain scale  - Administer analgesics based on type and severity of pain and evaluate response  - Implement non-pharmacological measures as appropriate and evaluate response  - Consider cultural and social influences on pain and pain management  - Notify physician/advanced practitioner if interventions unsuccessful or patient reports new pain  Outcome: Progressing     Problem: INFECTION - ADULT  Goal: Absence or prevention of progression during hospitalization  Description  INTERVENTIONS:  - Assess and monitor for signs and symptoms of infection  - Monitor lab/diagnostic results  - Monitor all insertion sites, i e  indwelling lines, tubes, and drains  - Monitor endotracheal if appropriate and nasal secretions for changes in amount and color  - [place] appropriate cooling/warming therapies per order  - Administer medications as ordered  - Instruct and encourage patient and family to use good hand hygiene technique  - Identify and instruct in appropriate isolation precautions for identified infection/condition  Outcome: Progressing  Goal: Absence of fever/infection during neutropenic period  Description  INTERVENTIONS:  - Monitor WBC    Outcome: Progressing     Problem: SAFETY ADULT  Goal: Patient will remain free of falls  Description  INTERVENTIONS:  - Assess patient frequently for physical needs  -  Identify cognitive and physical deficits and behaviors that affect risk of falls    -  [place] fall precautions as indicated by assessment   - Educate patient/family on patient safety including physical limitations  - Instruct patient to call for assistance with activity based on assessment  - Modify environment to reduce risk of injury  - Consider OT/PT consult to assist with strengthening/mobility  Outcome: Progressing  Goal: Maintain or return to baseline ADL function  Description  INTERVENTIONS:  -  Assess patient's ability to carry out ADLs; assess patient's baseline for ADL function and identify physical deficits which impact ability to perform ADLs (bathing, care of mouth/teeth, toileting, grooming, dressing, etc )  - Assess/evaluate cause of self-care deficits   - Assess range of motion  - Assess patient's mobility; develop plan if impaired  - Assess patient's need for assistive devices and provide as appropriate  - Encourage maximum independence but intervene and supervise when necessary  - Involve family in performance of ADLs  - Assess for home care needs following discharge   - Consider OT consult to assist with ADL evaluation and planning for discharge  - Provide patient education as appropriate  Outcome: Progressing  Goal: Maintain or return mobility status to optimal level  Description  INTERVENTIONS:  - Assess patient's baseline mobility status (ambulation, transfers, stairs, etc )    - Identify cognitive and physical deficits and behaviors that affect mobility  - Identify mobility aids required to assist with transfers and/or ambulation (gait belt, sit-to-stand, lift, walker, cane, etc )  - [place] fall precautions as indicated by assessment  - Record patient progress and toleration of activity level on Mobility SBAR; progress patient to next Phase/Stage  - Instruct patient to call for assistance with activity based on assessment  - Consider rehabilitation consult to assist with strengthening/weightbearing, etc   Outcome: Progressing     Problem: DISCHARGE PLANNING  Goal: Discharge to home or other facility with appropriate resources  Description  INTERVENTIONS:  - Identify barriers to discharge w/patient and caregiver  - Arrange for needed discharge resources and transportation as appropriate  - Identify discharge learning needs (meds, wound care, etc )  - Arrange for interpretive services to assist at discharge as needed  - Refer to Case Management Department for coordinating discharge planning if the patient needs post-hospital services based on physician/advanced practitioner order or complex needs related to functional status, cognitive ability, or social support system  Outcome: Progressing     Problem: Knowledge Deficit  Goal: Patient/family/caregiver demonstrates understanding of disease process, treatment plan, medications, and discharge instructions  Description  Complete learning assessment and assess knowledge base  Interventions:  - Provide teaching at level of understanding  - Provide teaching via preferred learning methods  Outcome: Progressing     Problem: Potential for Falls  Goal: Patient will remain free of falls  Description  INTERVENTIONS:  - Assess patient frequently for physical needs  -  Identify cognitive and physical deficits and behaviors that affect risk of falls    -  [place] fall precautions as indicated by assessment   - Educate patient/family on patient safety including physical limitations  - Instruct patient to call for assistance with activity based on assessment  - Modify environment to reduce risk of injury  - Consider OT/PT consult to assist with strengthening/mobility  Outcome: Progressing

## 2019-10-23 NOTE — PROGRESS NOTES
Patient agrees to obtain post op labs  She is scheduled to come into the office next week for review and follow up

## 2019-10-23 NOTE — PROGRESS NOTES
Progress Note - Bariatric Surgery   Silvia Auguste 61 y o  female MRN: [id number]  Unit/Bed#: -01 Encounter: [id number]      Subjective/Objective     Subjective:   Patient feeling completely improved; denies any pain  She admits she was previously eating a lot of corn nuts and wonders if this is what caused her obstruction  Benign abdominal exam  Had normal BM last night and has been passing flatus without issues  Ambulating and using IS  Denies fevers, chills, sweats, SOB, CP, calf pain/swelling  Objective:    /68 (BP Location: Left arm)   Pulse 76   Temp 99 °F (37 2 °C) (Oral)   Resp 16   Ht 5' 5" (1 651 m)   Wt 109 kg (240 lb 4 8 oz)   SpO2 93%   BMI 39 99 kg/m²       Intake/Output Summary (Last 24 hours) at 10/23/2019 0732  Last data filed at 10/23/2019 0655  Gross per 24 hour   Intake 3000 ml   Output 1200 ml   Net 1800 ml       Invasive Devices     Peripheral Intravenous Line            Peripheral IV 10/22/19 Right Antecubital 1 day                ROS: 10-point system completed  All negative except see HPI  Physical Exam    General Appearance:    Alert, cooperative, no distress, appears stated age   Head:    Normocephalic, without obvious abnormality, atraumatic   Lungs:     Clear to auscultation bilaterally, respirations unlabored   Heart:    Regular rate and rhythm, S1 and S2 normal, no murmur, rub    or gallop   Abdomen:     Soft, non-tender, bowel sounds active all four quadrants, non distended, well healed open and lap incisions   Extremities:   Extremities normal, atraumatic, no cyanosis or edema   Neurologic:   CNII-XII intact  Normal strength and sensation               Lab, Imaging and other studies:  I have personally reviewed pertinent lab results    , CBC:   Lab Results   Component Value Date    WBC 6 82 10/23/2019    HGB 9 3 (L) 10/23/2019    HCT 30 2 (L) 10/23/2019    MCV 78 (L) 10/23/2019     10/23/2019    MCH 23 9 (L) 10/23/2019    MCHC 30 8 (L) 10/23/2019 RDW 16 6 (H) 10/23/2019    MPV 9 7 10/23/2019    NRBC 0 10/23/2019   , CMP:   Lab Results   Component Value Date    SODIUM 142 10/23/2019    K 3 6 10/23/2019     10/23/2019    CO2 27 10/23/2019    BUN 12 10/23/2019    CREATININE 0 60 10/23/2019    CALCIUM 8 6 10/23/2019    EGFR 100 10/23/2019      Lactic Acid (0 5), Mag, Phos today all WNL     VTE Mechanical Prophylaxis: sequential compression device, heparin     Assessment/Plan    Assessment:  59/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 and necrotic small bowel admitted for observation for SBO likely secondary to adhesive disease      Plan:  -Continue NPO and IVF  -OOB/ambulate and DVT ppx   -Encourage IS  -Obstruction series scheduled for 8am - will f/u with results  -If obstruction series demonstrates contrast in colon with resolution or improvement of SBO we will start Bariatric clears   -If tolerating adequate fluids and continues to improve without pain or issues will plan for D/C this afternoon  -Advised patient that she must f/u for metabolic panel in the office and address ROC and postsurgical malabsorption issues as well as diet and lifestyle changes  She admits to not taking vitamins and has never had colonoscopy - advised on the absolute importance and she verbalizes understanding      Case discussed with Dr Jalyn Henderson PA-C  10/23/2019  7:37 AM

## 2019-10-23 NOTE — UTILIZATION REVIEW
Initial Clinical Review    Admission: Date/Time/Statement: 10/22 1223  Orders Placed This Encounter   Procedures    Place in Observation     Standing Status:   Standing     Number of Occurrences:   1     Order Specific Question:   Admitting Physician     Answer:   Tiffani You     Order Specific Question:   Level of Care     Answer:   Med Surg [16]     ED Arrival Information     Expected Arrival Acuity Means of Arrival Escorted By Service Admission Type    - 10/22/2019 06:19 Urgent Walk-In Family Member Bariatrics Urgent    Arrival Complaint    Abdominal Pain        Chief Complaint   Patient presents with    Abdominal Pain     Per Pt " I've been having abd pain since last night  It ook vicodin and morphine this morning but it was not effective " Pt c/o nuasea, chills and sweats  Assessment/Plan: 62 yo female to ED from home s/p SBR 1/20/2019 sec to pelvic adhesions and necrotic SB c/o intermittent cramping abd pain , no relief w/ percocet or morphine   Passing gas and 2 BMs yest   IN ED CT revealed SBO   Admitted OBS status with plan to make NPO , IVF , OOB , recheck labs and OBS series in am   If resolution of obstr  Will trial diet       ED Triage Vitals   Temperature Pulse Respirations Blood Pressure SpO2   10/22/19 0631 10/22/19 0629 10/22/19 0629 10/22/19 0629 10/22/19 0629   98 2 °F (36 8 °C) 87 20 (!) 161/118 99 %      Temp Source Heart Rate Source Patient Position - Orthostatic VS BP Location FiO2 (%)   10/22/19 0631 10/22/19 0629 10/22/19 0629 10/22/19 0629 --   Oral Monitor Lying Right arm       Pain Score       10/22/19 0629       7        Wt Readings from Last 1 Encounters:   10/22/19 109 kg (240 lb 4 8 oz)     Additional Vital Signs:   10/23/19 0700  98 2 °F (36 8 °C)  69  18  153/95    98 %  None (Room air)  Sitting   10/23/19 0300  99 °F (37 2 °C)  76  16  140/68  97  93 %  None (Room air)  Lying   10/22/19 2300  98 3 °F (36 8 °C)  70  18  150/72  103  96 %  None (Room air)  Sitting 10/22/19 1843  99 2 °F (37 3 °C)  84  18  125/75    98 %  None (Room air)  Sitting   10/22/19 1444  98 6 °F (37 °C)  84  18  149/79    100 %  None (Room air)  Sitting   10/22/19 1413    80  17  121/61    99 %  None (Room air)  Sitting   10/22/19 1047    94  19  144/62    98 %  None (Room air)  Sitting   10/22/19 0816    86  17  155/79    99 %  None (Room air)  Sitting   10/22/19 0631  98 2 °F (36 8 °C)                     Pertinent Labs/Diagnostic Test Results:   10/23 OBS series - pending   10/22 CT abd - Findings suggest small bowel obstruction probably at the level of jejunojejunostomy anastomosis  Mild infiltration adjacent to the anastomosis noted  Dilated small bowel loops in the upper and mid abdomen      Postsurgical changes from gastric bypass with expected the size of the gastric pouch however there is air and fluid in the excluded stomach, consider nonemergent evaluation to evaluate for gastric gastric fistula      Enteric contrast is also seen within the biliary pancreatic limb, this may be related to presence of Gastrogastric fistula or may be due to reflux of the contrast up the biliary pancreatic limb     Calcification in the gallbladder consider nonemergent gallbladder ultrasound to evaluate for a fixed calcified lesion versus  free floating stones      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 ,  Stable since the previous study of January 10, 2019    Continued surveillance suggested with follow-up at 6 months  Follow-up notification has been created in the Epic  Results from last 7 days   Lab Units 10/23/19  0539 10/22/19  1412 10/22/19  0704   WBC Thousand/uL 6 82  --  15 76*   HEMOGLOBIN g/dL 9 3*  --  11 0*   HEMATOCRIT % 30 2*  --  37 2   PLATELETS Thousands/uL 265 339 384   NEUTROS ABS Thousands/µL 3 46  --  14 43*     Results from last 7 days   Lab Units 10/23/19  0539 10/22/19  0704   SODIUM mmol/L 142 140   POTASSIUM mmol/L 3 6 4 2   CHLORIDE mmol/L 107 102   CO2 mmol/L 27 28   ANION GAP mmol/L 8 10   BUN mg/dL 12 20   CREATININE mg/dL 0 60 0 70   EGFR ml/min/1 73sq m 100 95   CALCIUM mg/dL 8 6 9 1   MAGNESIUM mg/dL 1 9  --    PHOSPHORUS mg/dL 3 9  --      Results from last 7 days   Lab Units 10/22/19  0704   AST U/L 24   ALT U/L 25   ALK PHOS U/L 115   TOTAL PROTEIN g/dL 8 4*   ALBUMIN g/dL 3 5   TOTAL BILIRUBIN mg/dL 0 20     Results from last 7 days   Lab Units 10/23/19  0539 10/22/19  0704   GLUCOSE RANDOM mg/dL 83 124     Results from last 7 days   Lab Units 10/23/19  0539 10/22/19  0704   LACTIC ACID mmol/L 0 5 2 2*     Results from last 7 days   Lab Units 10/22/19  0704   LIPASE u/L 103     Results from last 7 days   Lab Units 10/22/19  0851   CLARITY UA  Clear   COLOR UA  Yellow   SPEC GRAV UA  <=1 005   PH UA  6 5   GLUCOSE UA mg/dl Negative   KETONES UA mg/dl Negative   BLOOD UA  Negative   PROTEIN UA mg/dl Negative   NITRITE UA  Negative   BILIRUBIN UA  Negative   UROBILINOGEN UA E U /dl 0 2   LEUKOCYTES UA  Negative       ED Treatment:   Medication Administration from 10/22/2019 0678 to 10/22/2019 1434       Date/Time Order Dose Route Action     10/22/2019 0705 sodium chloride 0 9 % bolus 1,000 mL 1,000 mL Intravenous New Bag     10/22/2019 0705 ondansetron (ZOFRAN) injection 4 mg 4 mg Intravenous Given     10/22/2019 1223 lactated ringers bolus 1,000 mL 1,000 mL Intravenous New Bag        Past Medical History:   Diagnosis Date    Disease of thyroid gland     Hypertension      Present on Admission:  **None**      Admitting Diagnosis: Bowel obstruction (HCC) [K56 609]  Abdominal pain [R10 9]  Age/Sex: 61 y o  female  Admission Orders:    Medications:  heparin (porcine) 5,000 Units Subcutaneous Q8H [facility]       lactated ringers 125 mL/hr Intravenous Continuous       acetaminophen 975 mg Oral Q6H PRN     NPO  SCD  I&O   amb pt   Tele   IS    Network Utilization Review Department  [email or web address]  ATTENTION: Please call with any questions or concerns to [phone] and carefully listen to the prompts so that you are directed to the right person  All voicemails are confidential   Angietine Can all requests for admission clinical reviews, approved or denied determinations and any other requests to dedicated fax number below belonging to the campus where the patient is receiving treatment    FACILITY NAME UR FAX NUMBER   ADMISSION DENIALS (Administrative/Medical Necessity) [address] [facility] (Maternity/NICU/Pediatrics) [phone]   [facility] [id number] [place] [address] [address] [facility] [phone]   [facility] [address] [facility] [phone]   Erin  [address] [facility] [address] [phone]

## 2019-10-23 NOTE — SOCIAL WORK
Per RN during rounds pt will have kub and is r/o sbo  Pt is moving bowels but remains npo  Pending diet adv and tolerance pt may be dc'ed later today  Pt has no needs anticipated and is independent

## 2019-10-23 NOTE — UTILIZATION REVIEW
Notification of Observation Care Status/Observation Authorization Request    This is a Notification of Observation Care Status to our facility [address] [place] [address]  Be advised that this patient was admitted to our facility under Observation Status  Please contact the Utilization Review Department where the patient is receiving care services for additional admission information  Facility: [address] [place] [address]  Place of Service Code: 22  Place of Service Name: [facility]  CPT Code for Observation: CPT  / CPT 52520    Presentation Date & Time: 10/22/2019  6:28 AM  Observation Admission Date & Time: 32 hours  Discharge Date & Time: 10/23/2019  2:58 PM   Discharge Disposition (if discharged): Home/Self Care  Attending Physician and NPI#: Himanshu Ingram, [address] [[id number]]  Admission Orders (From admission, onward)     Ordered        10/22/19 1223  Place in Observation  Once                   Network Utilization Review Department  Phone: [phone]; Fax [phone]  [email or web address]  ATTENTION: Please call with any questions or concerns to [phone] and carefully listen to the prompts so that you are directed to the right person  All voicemails are confidential   Finis Feeling all requests for admission clinical reviews, approved or denied determinations and any other requests to dedicated fax number below belonging to the campus where the patient is receiving treatment    Initial Clinical Review    Admission: Date/Time/Statement: 10/22 1223  Orders Placed This Encounter   Procedures    Place in Observation     Standing Status:   Standing     Number of Occurrences:   1     Order Specific Question:   Admitting Physician     Answer:   Mekhi Schmidt     Order Specific Question:   Level of Care     Answer:   Med Surg [16]     ED Arrival Information     Expected Arrival Acuity Means of Arrival Escorted By Service Admission Type - 10/22/2019 06:19 Urgent Walk-In Family Member Bariatrics Urgent    Arrival Complaint    Abdominal Pain        Chief Complaint   Patient presents with    Abdominal Pain     Per Pt " I've been having abd pain since last night  It ook vicodin and morphine this morning but it was not effective " Pt c/o nuasea, chills and sweats  Assessment/Plan: 62 yo female to ED from home s/p SBR 1/20/2019 sec to pelvic adhesions and necrotic SB c/o intermittent cramping abd pain , no relief w/ percocet or morphine   Passing gas and 2 BMs yest   IN ED CT revealed SBO   Admitted OBS status with plan to make NPO , IVF , OOB , recheck labs and OBS series in am   If resolution of obstr  Will trial diet       ED Triage Vitals   Temperature Pulse Respirations Blood Pressure SpO2   10/22/19 0631 10/22/19 0629 10/22/19 0629 10/22/19 0629 10/22/19 0629   98 2 °F (36 8 °C) 87 20 (!) 161/118 99 %      Temp Source Heart Rate Source Patient Position - Orthostatic VS BP Location FiO2 (%)   10/22/19 0631 10/22/19 0629 10/22/19 0629 10/22/19 0629 --   Oral Monitor Lying Right arm       Pain Score       10/22/19 0629       7          Wt Readings from Last 1 Encounters:   10/22/19 109 kg (240 lb 4 8 oz)     Additional Vital Signs:   10/23/19 0700  98 2 °F (36 8 °C)  69  18  153/95    98 %  None (Room air)  Sitting   10/23/19 0300  99 °F (37 2 °C)  76  16  140/68  97  93 %  None (Room air)  Lying   10/22/19 2300  98 3 °F (36 8 °C)  70  18  150/72  103  96 %  None (Room air)  Sitting   10/22/19 1843  99 2 °F (37 3 °C)  84  18  125/75    98 %  None (Room air)  Sitting   10/22/19 1444  98 6 °F (37 °C)  84  18  149/79    100 %  None (Room air)  Sitting   10/22/19 1413    80  17  121/61    99 %  None (Room air)  Sitting   10/22/19 1047    94  19  144/62    98 %  None (Room air)  Sitting   10/22/19 0816    86  17  155/79    99 %  None (Room air)  Sitting   10/22/19 0631  98 2 °F (36 8 °C)                     Pertinent Labs/Diagnostic Test Results:   10/23 OBS series - pending   10/22 CT abd - Findings suggest small bowel obstruction probably at the level of jejunojejunostomy anastomosis  Mild infiltration adjacent to the anastomosis noted  Dilated small bowel loops in the upper and mid abdomen      Postsurgical changes from gastric bypass with expected the size of the gastric pouch however there is air and fluid in the excluded stomach, consider nonemergent evaluation to evaluate for gastric gastric fistula      Enteric contrast is also seen within the biliary pancreatic limb, this may be related to presence of Gastrogastric fistula or may be due to reflux of the contrast up the biliary pancreatic limb     Calcification in the gallbladder consider nonemergent gallbladder ultrasound to evaluate for a fixed calcified lesion versus  free floating stones      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 ,  Stable since the previous study of January 10, 2019    Continued surveillance suggested with follow-up at 6 months  Follow-up notification has been created in the Epic  Results from last 7 days   Lab Units 10/23/19  0539 10/22/19  1412 10/22/19  0704   WBC Thousand/uL 6 82  --  15 76*   HEMOGLOBIN g/dL 9 3*  --  11 0*   HEMATOCRIT % 30 2*  --  37 2   PLATELETS Thousands/uL 265 339 384   NEUTROS ABS Thousands/µL 3 46  --  14 43*     Results from last 7 days   Lab Units 10/23/19  0539 10/22/19  0704   SODIUM mmol/L 142 140   POTASSIUM mmol/L 3 6 4 2   CHLORIDE mmol/L 107 102   CO2 mmol/L 27 28   ANION GAP mmol/L 8 10   BUN mg/dL 12 20   CREATININE mg/dL 0 60 0 70   EGFR ml/min/1 73sq m 100 95   CALCIUM mg/dL 8 6 9 1   MAGNESIUM mg/dL 1 9  --    PHOSPHORUS mg/dL 3 9  --      Results from last 7 days   Lab Units 10/22/19  0704   AST U/L 24   ALT U/L 25   ALK PHOS U/L 115   TOTAL PROTEIN g/dL 8 4*   ALBUMIN g/dL 3 5   TOTAL BILIRUBIN mg/dL 0 20     Results from last 7 days   Lab Units 10/23/19  0539 10/22/19  7819 GLUCOSE RANDOM mg/dL 83 124     Results from last 7 days   Lab Units 10/23/19  0539 10/22/19  0704   LACTIC ACID mmol/L 0 5 2 2*     Results from last 7 days   Lab Units 10/22/19  0704   LIPASE u/L 103     Results from last 7 days   Lab Units 10/22/19  0851   CLARITY UA  Clear   COLOR UA  Yellow   SPEC GRAV UA  <=1 005   PH UA  6 5   GLUCOSE UA mg/dl Negative   KETONES UA mg/dl Negative   BLOOD UA  Negative   PROTEIN UA mg/dl Negative   NITRITE UA  Negative   BILIRUBIN UA  Negative   UROBILINOGEN UA E U /dl 0 2   LEUKOCYTES UA  Negative       ED Treatment:   Medication Administration from 10/22/2019 7336 to 10/22/2019 1434       Date/Time Order Dose Route Action     10/22/2019 0705 sodium chloride 0 9 % bolus 1,000 mL 1,000 mL Intravenous New Bag     10/22/2019 0705 ondansetron (ZOFRAN) injection 4 mg 4 mg Intravenous Given     10/22/2019 1223 lactated ringers bolus 1,000 mL 1,000 mL Intravenous New Bag        Past Medical History:   Diagnosis Date    Disease of thyroid gland     Hypertension      Present on Admission:  **None**      Admitting Diagnosis: Bowel obstruction (HCC) [K56 609]  Abdominal pain [R10 9]  Age/Sex: 61 y o  female  Admission Orders:    Medications:  heparin (porcine) 5,000 Units Subcutaneous Q8H [facility]       lactated ringers 125 mL/hr Intravenous Continuous       acetaminophen 975 mg Oral Q6H PRN     NPO  SCD  I&O   amb pt   Tele   IS    Network Utilization Review Department  [email or web address]  ATTENTION: Please call with any questions or concerns to [phone] and carefully listen to the prompts so that you are directed to the right person  All voicemails are confidential   Sylvia Nixon all requests for admission clinical reviews, approved or denied determinations and any other requests to dedicated fax number below belonging to the campus where the patient is receiving treatment    FACILITY NAME UR FAX NUMBER   ADMISSION DENIALS (Administrative/Medical Necessity) [address] [facility] (Maternity/NICU/Pediatrics) [place] [phone]   Jonas Gerard [phone]   Brendon Loud [phone]   [address] [phone]

## 2019-10-24 NOTE — UTILIZATION REVIEW
Notification of Discharge  This is a Notification of Discharge from our facility [address]  Please be advised that this patient has been discharge from our facility  Below you will find the admission and discharge date and time including the patients disposition  PRESENTATION DATE: 10/22/2019  6:28 AM  OBS ADMISSION DATE: 10/22/2019  IP ADMISSION DATE: N/A N/A   DISCHARGE DATE: 10/23/2019  2:58 PM  DISPOSITION: Home/Self Care Home/Self Care   Admission Orders listed below:  Admission Orders (From admission, onward)     Ordered        10/22/19 1223  Place in Observation  Once                   Please contact the UR Department if additional information is required to close this patient's authorization/case  [address]  Utilization Review Department  Phone: [phone]; Fax [phone]  [email or web address]  ATTENTION: Please call with any questions or concerns to [phone]  and carefully listen to the prompts so that you are directed to the right person  Send all requests for admission clinical reviews, approved or denied determinations and any other requests to fax [phone]   All voicemails are confidential

## 2019-10-28 ENCOUNTER — APPOINTMENT (OUTPATIENT)
Dept: LAB | Facility: HOSPITAL | Age: 59
End: 2019-10-28
Payer: COMMERCIAL

## 2019-10-28 DIAGNOSIS — K91.2 POSTSURGICAL MALABSORPTION: ICD-10-CM

## 2019-10-28 DIAGNOSIS — D50.9 IRON DEFICIENCY ANEMIA: ICD-10-CM

## 2019-10-28 LAB
25(OH)D3 SERPL-MCNC: 15 NG/ML (ref 30–100)
ALBUMIN SERPL BCP-MCNC: 3.8 G/DL (ref 3.5–5)
ALP SERPL-CCNC: 111 U/L (ref 46–116)
ALT SERPL W P-5'-P-CCNC: 33 U/L (ref 12–78)
ANION GAP SERPL CALCULATED.3IONS-SCNC: 8 MMOL/L (ref 4–13)
AST SERPL W P-5'-P-CCNC: 21 U/L (ref 5–45)
BASOPHILS # BLD AUTO: 0.04 THOUSANDS/ΜL (ref 0–0.1)
BASOPHILS NFR BLD AUTO: 0 % (ref 0–1)
BILIRUB SERPL-MCNC: 0.3 MG/DL (ref 0.2–1)
BUN SERPL-MCNC: 14 MG/DL (ref 5–25)
CALCIUM SERPL-MCNC: 9.2 MG/DL (ref 8.3–10.1)
CHLORIDE SERPL-SCNC: 100 MMOL/L (ref 100–108)
CO2 SERPL-SCNC: 29 MMOL/L (ref 21–32)
CREAT SERPL-MCNC: 0.71 MG/DL (ref 0.6–1.3)
EOSINOPHIL # BLD AUTO: 0.09 THOUSAND/ΜL (ref 0–0.61)
EOSINOPHIL NFR BLD AUTO: 1 % (ref 0–6)
ERYTHROCYTE [DISTWIDTH] IN BLOOD BY AUTOMATED COUNT: 17.4 % (ref 11.6–15.1)
FERRITIN SERPL-MCNC: 8 NG/ML (ref 8–388)
FOLATE SERPL-MCNC: >20 NG/ML (ref 3.1–17.5)
GFR SERPL CREATININE-BSD FRML MDRD: 94 ML/MIN/1.73SQ M
GLUCOSE SERPL-MCNC: 83 MG/DL (ref 65–140)
HCT VFR BLD AUTO: 36.7 % (ref 34.8–46.1)
HGB BLD-MCNC: 10.8 G/DL (ref 11.5–15.4)
IMM GRANULOCYTES # BLD AUTO: 0.02 THOUSAND/UL (ref 0–0.2)
IMM GRANULOCYTES NFR BLD AUTO: 0 % (ref 0–2)
IRON SATN MFR SERPL: 7 %
IRON SERPL-MCNC: 34 UG/DL (ref 50–170)
LYMPHOCYTES # BLD AUTO: 3.44 THOUSANDS/ΜL (ref 0.6–4.47)
LYMPHOCYTES NFR BLD AUTO: 36 % (ref 14–44)
MCH RBC QN AUTO: 23.1 PG (ref 26.8–34.3)
MCHC RBC AUTO-ENTMCNC: 29.4 G/DL (ref 31.4–37.4)
MCV RBC AUTO: 79 FL (ref 82–98)
MONOCYTES # BLD AUTO: 0.72 THOUSAND/ΜL (ref 0.17–1.22)
MONOCYTES NFR BLD AUTO: 7 % (ref 4–12)
NEUTROPHILS # BLD AUTO: 5.38 THOUSANDS/ΜL (ref 1.85–7.62)
NEUTS SEG NFR BLD AUTO: 56 % (ref 43–75)
NRBC BLD AUTO-RTO: 0 /100 WBCS
PLATELET # BLD AUTO: 382 THOUSANDS/UL (ref 149–390)
PMV BLD AUTO: 10.5 FL (ref 8.9–12.7)
POTASSIUM SERPL-SCNC: 3.7 MMOL/L (ref 3.5–5.3)
PROT SERPL-MCNC: 8.6 G/DL (ref 6.4–8.2)
PTH-INTACT SERPL-MCNC: 79.2 PG/ML (ref 18.4–80.1)
RBC # BLD AUTO: 4.67 MILLION/UL (ref 3.81–5.12)
SODIUM SERPL-SCNC: 137 MMOL/L (ref 136–145)
TIBC SERPL-MCNC: 467 UG/DL (ref 250–450)
VIT B12 SERPL-MCNC: 499 PG/ML (ref 100–900)
WBC # BLD AUTO: 9.69 THOUSAND/UL (ref 4.31–10.16)

## 2019-10-28 PROCEDURE — 82607 VITAMIN B-12: CPT

## 2019-10-28 PROCEDURE — 85025 COMPLETE CBC W/AUTO DIFF WBC: CPT

## 2019-10-28 PROCEDURE — 83970 ASSAY OF PARATHORMONE: CPT

## 2019-10-28 PROCEDURE — 82746 ASSAY OF FOLIC ACID SERUM: CPT

## 2019-10-28 PROCEDURE — 82728 ASSAY OF FERRITIN: CPT

## 2019-10-28 PROCEDURE — 80053 COMPREHEN METABOLIC PANEL: CPT

## 2019-10-28 PROCEDURE — 82525 ASSAY OF COPPER: CPT

## 2019-10-28 PROCEDURE — 83540 ASSAY OF IRON: CPT

## 2019-10-28 PROCEDURE — 83550 IRON BINDING TEST: CPT

## 2019-10-28 PROCEDURE — 82306 VITAMIN D 25 HYDROXY: CPT

## 2019-10-28 PROCEDURE — 84425 ASSAY OF VITAMIN B-1: CPT

## 2019-10-28 PROCEDURE — 84590 ASSAY OF VITAMIN A: CPT

## 2019-10-28 PROCEDURE — 36415 COLL VENOUS BLD VENIPUNCTURE: CPT

## 2019-10-29 ENCOUNTER — TELEPHONE (OUTPATIENT)
Dept: BARIATRICS | Facility: CLINIC | Age: 59
End: 2019-10-29

## 2019-10-29 DIAGNOSIS — D50.9 IRON DEFICIENCY ANEMIA: ICD-10-CM

## 2019-10-29 DIAGNOSIS — E55.9 VITAMIN D DEFICIENCY: ICD-10-CM

## 2019-10-29 DIAGNOSIS — K91.2 POSTSURGICAL MALABSORPTION: Primary | ICD-10-CM

## 2019-10-29 DIAGNOSIS — E53.8 LOW SERUM VITAMIN B12: ICD-10-CM

## 2019-10-29 RX ORDER — ERGOCALCIFEROL 1.25 MG/1
50000 CAPSULE ORAL
Qty: 24 CAPSULE | Refills: 0 | Status: SHIPPED | OUTPATIENT
Start: 2019-10-31

## 2019-10-29 NOTE — TELEPHONE ENCOUNTER
Left message and discussed labs:     -Vitamin D deficiency: Start Rx and repeat labs in 4 months    -Iron deficiency anemia: continue with oral iron repletion as dicussed with Vitron C and will place referral to hematology for infusions - they will repeat labs    -B12 low normal (499): add additional 1,000mcg sublingual B12 daily     -Will f/u with remaining results and notify the patient if abnormal

## 2019-10-30 LAB — COPPER SERPL-MCNC: 142 UG/DL (ref 72–166)

## 2019-10-31 ENCOUNTER — OFFICE VISIT (OUTPATIENT)
Dept: BARIATRICS | Facility: CLINIC | Age: 59
End: 2019-10-31
Payer: COMMERCIAL

## 2019-10-31 VITALS
TEMPERATURE: 99.8 F | HEIGHT: 65 IN | WEIGHT: 235.5 LBS | DIASTOLIC BLOOD PRESSURE: 86 MMHG | HEART RATE: 83 BPM | BODY MASS INDEX: 39.24 KG/M2 | SYSTOLIC BLOOD PRESSURE: 122 MMHG

## 2019-10-31 DIAGNOSIS — Z48.815 ENCOUNTER FOR SURGICAL AFTERCARE FOLLOWING SURGERY ON THE DIGESTIVE SYSTEM: Primary | ICD-10-CM

## 2019-10-31 DIAGNOSIS — E55.9 VITAMIN D DEFICIENCY: ICD-10-CM

## 2019-10-31 DIAGNOSIS — D50.9 IDA (IRON DEFICIENCY ANEMIA): ICD-10-CM

## 2019-10-31 DIAGNOSIS — E66.9 OBESITY, CLASS II, BMI 35-39.9: ICD-10-CM

## 2019-10-31 DIAGNOSIS — E66.01 MORBID (SEVERE) OBESITY DUE TO EXCESS CALORIES (HCC): ICD-10-CM

## 2019-10-31 LAB — VIT A SERPL-MCNC: 23.4 UG/DL (ref 20.1–62)

## 2019-10-31 PROCEDURE — 99213 OFFICE O/P EST LOW 20 MIN: CPT | Performed by: SURGERY

## 2019-10-31 RX ORDER — IBUPROFEN 200 MG
1 CAPSULE ORAL DAILY
COMMUNITY

## 2019-10-31 NOTE — PROGRESS NOTES
Progress Note - Bariatric Surgery   Joe Gómez 61 y o  female MRN: [id number]  Unit/Bed#:  Encounter: [id number]    Assessment:  59/F h/o LRYGB s/p open SBR 1/20 /19 secondary to pelvic adhesions and necrotic small bowel now with SBO likely secondary to adhesive disease which resolved during hospitalization  She is regularly passing gas and has been having daily bowel movements  She also denies pain  Plan:  Mindful eating, stress reduction sleep hygine  Cont to take MVI/Minerals, B12, Calcium/D3 as prescribed   F/U with Brittnee Gordon PA-C in 3 months for repeat labs     Subjective/Objective     Subjective: Feels well  +bowel function    Review of systems: Ten point review of systems is negative     Objective: Looks well     Blood pressure 122/86, pulse 83, temperature 99 8 °F (37 7 °C), temperature source Tympanic, height 5' 5" (1 651 m), weight 107 kg (235 lb 8 oz)  ,Body mass index is 39 19 kg/m²  Physical Exam:     NAD  RRR   Breathing non labored   Abd soft; NT/ND    Lab, Imaging and other studies:I have personally reviewed pertinent lab results

## 2019-11-01 LAB — VIT B1 BLD-SCNC: 134.8 NMOL/L (ref 66.5–200)

## 2020-01-13 ENCOUNTER — TELEPHONE (OUTPATIENT)
Dept: BARIATRICS | Facility: CLINIC | Age: 60
End: 2020-01-13

## 2020-01-13 DIAGNOSIS — Z48.815 ENCOUNTER FOR SURGICAL AFTERCARE FOLLOWING SURGERY ON THE DIGESTIVE SYSTEM: Primary | ICD-10-CM

## 2020-01-13 DIAGNOSIS — E55.9 VITAMIN D DEFICIENCY: ICD-10-CM

## 2020-01-13 DIAGNOSIS — K91.2 POSTSURGICAL MALABSORPTION: ICD-10-CM

## 2020-01-13 DIAGNOSIS — D64.9 ANEMIA: ICD-10-CM

## 2020-01-13 NOTE — TELEPHONE ENCOUNTER
Patient called to inquire about labs  She has been taking her Vitamins consistently  Reminded her to obtain Repeat Vitamin D, CBC, ferritin, iron labs in the beginning of February  She did not go to hematology as directed

## 2020-01-14 DIAGNOSIS — E55.9 VITAMIN D DEFICIENCY: ICD-10-CM

## 2020-01-14 DIAGNOSIS — K91.2 POSTSURGICAL MALABSORPTION: ICD-10-CM

## 2020-01-14 RX ORDER — ERGOCALCIFEROL 1.25 MG/1
50000 CAPSULE ORAL
Qty: 24 CAPSULE | Refills: 0 | OUTPATIENT
Start: 2020-01-16

## 2020-01-15 ENCOUNTER — TELEPHONE (OUTPATIENT)
Dept: BARIATRICS | Facility: CLINIC | Age: 60
End: 2020-01-15

## 2020-01-15 NOTE — TELEPHONE ENCOUNTER
Vitamin D Rx was sent to the patient's [facility] pharmacy on 10/29/19   I am happy to resubmit the Rx but she should call her pharmacy first  Thanks

## 2020-01-15 NOTE — TELEPHONE ENCOUNTER
Patient left a voicemail on the clinical line about her prescription for Vitamin D  She said she was seen this week and it was not sent to her pharmacy

## 2020-01-16 NOTE — TELEPHONE ENCOUNTER
Patient indicates that she did fill her prescription for Vitamin D and has been taking it properly since the end of October and wants a refill now  I instructed patient that this is to be taken for 12 weeks and then her Vitamin D levels should be checked again with labs  Explained that our PA has those labs in her chart for her to be checked  Patient verbalizes understanding and will have her labs drawn before her next appointment here at the end of this month

## 2020-01-21 ENCOUNTER — APPOINTMENT (OUTPATIENT)
Dept: LAB | Facility: HOSPITAL | Age: 60
End: 2020-01-21
Payer: COMMERCIAL

## 2020-01-21 DIAGNOSIS — Z48.815 ENCOUNTER FOR SURGICAL AFTERCARE FOLLOWING SURGERY ON THE DIGESTIVE SYSTEM: ICD-10-CM

## 2020-01-21 DIAGNOSIS — E55.9 VITAMIN D DEFICIENCY: ICD-10-CM

## 2020-01-21 DIAGNOSIS — D64.9 ANEMIA: ICD-10-CM

## 2020-01-21 DIAGNOSIS — K91.2 POSTSURGICAL MALABSORPTION: ICD-10-CM

## 2020-01-21 LAB
25(OH)D3 SERPL-MCNC: 49.5 NG/ML (ref 30–100)
BASOPHILS # BLD AUTO: 0.04 THOUSANDS/ΜL (ref 0–0.1)
BASOPHILS NFR BLD AUTO: 1 % (ref 0–1)
EOSINOPHIL # BLD AUTO: 0.12 THOUSAND/ΜL (ref 0–0.61)
EOSINOPHIL NFR BLD AUTO: 2 % (ref 0–6)
ERYTHROCYTE [DISTWIDTH] IN BLOOD BY AUTOMATED COUNT: 18.5 % (ref 11.6–15.1)
FERRITIN SERPL-MCNC: 17 NG/ML (ref 8–388)
HCT VFR BLD AUTO: 40.6 % (ref 34.8–46.1)
HGB BLD-MCNC: 12.6 G/DL (ref 11.5–15.4)
IMM GRANULOCYTES # BLD AUTO: 0.01 THOUSAND/UL (ref 0–0.2)
IMM GRANULOCYTES NFR BLD AUTO: 0 % (ref 0–2)
IRON SATN MFR SERPL: 10 %
IRON SERPL-MCNC: 35 UG/DL (ref 50–170)
LYMPHOCYTES # BLD AUTO: 1.37 THOUSANDS/ΜL (ref 0.6–4.47)
LYMPHOCYTES NFR BLD AUTO: 22 % (ref 14–44)
MCH RBC QN AUTO: 26 PG (ref 26.8–34.3)
MCHC RBC AUTO-ENTMCNC: 31 G/DL (ref 31.4–37.4)
MCV RBC AUTO: 84 FL (ref 82–98)
MONOCYTES # BLD AUTO: 0.92 THOUSAND/ΜL (ref 0.17–1.22)
MONOCYTES NFR BLD AUTO: 15 % (ref 4–12)
NEUTROPHILS # BLD AUTO: 3.76 THOUSANDS/ΜL (ref 1.85–7.62)
NEUTS SEG NFR BLD AUTO: 60 % (ref 43–75)
NRBC BLD AUTO-RTO: 0 /100 WBCS
PLATELET # BLD AUTO: 237 THOUSANDS/UL (ref 149–390)
PMV BLD AUTO: 11 FL (ref 8.9–12.7)
RBC # BLD AUTO: 4.84 MILLION/UL (ref 3.81–5.12)
TIBC SERPL-MCNC: 355 UG/DL (ref 250–450)
VIT B12 SERPL-MCNC: 1095 PG/ML (ref 100–900)
WBC # BLD AUTO: 6.22 THOUSAND/UL (ref 4.31–10.16)

## 2020-01-21 PROCEDURE — 82728 ASSAY OF FERRITIN: CPT

## 2020-01-21 PROCEDURE — 85025 COMPLETE CBC W/AUTO DIFF WBC: CPT

## 2020-01-21 PROCEDURE — 83540 ASSAY OF IRON: CPT

## 2020-01-21 PROCEDURE — 82306 VITAMIN D 25 HYDROXY: CPT

## 2020-01-21 PROCEDURE — 82607 VITAMIN B-12: CPT

## 2020-01-21 PROCEDURE — 83550 IRON BINDING TEST: CPT

## 2020-01-21 PROCEDURE — 84630 ASSAY OF ZINC: CPT

## 2020-01-21 PROCEDURE — 36415 COLL VENOUS BLD VENIPUNCTURE: CPT

## 2020-01-23 LAB — ZINC SERPL-MCNC: 69 UG/DL (ref 56–134)

## 2020-01-30 PROBLEM — Z48.815 ENCOUNTER FOR SURGICAL AFTERCARE FOLLOWING SURGERY OF DIGESTIVE SYSTEM: Status: ACTIVE | Noted: 2020-01-30

## 2020-01-30 PROBLEM — K91.2 POSTSURGICAL MALABSORPTION: Status: ACTIVE | Noted: 2020-01-30

## 2020-01-30 NOTE — ASSESSMENT & PLAN NOTE
-Hgb now normalized on 01/21/20 labs  -Ferritin improved from 8 to 17, but iron still low at 35  -Continue with Vitron C BID  -Repeat iron studies in 3 months  -F/U with GI for colonoscopy

## 2020-01-30 NOTE — ASSESSMENT & PLAN NOTE
-s/p lap RYGB with Dr Arleth Yao in 02/24/15 and s/p laparoscopic converted to open small bowel resection, lysis of adhesions on 01/19/19  Initial: 297 5lbs  Current: 230 7lbs  EWL: 47%  Simone: 188lbs 1 year post op  Current BMI is Body mass index is 38 39 kg/m²  · Tolerating a regular diet-yes  · Eating at least 60 grams of protein per day-yes  · Following 30/60 minute rule with liquids-yes  · Drinking at least 64 ounces of fluid per day-no; advised her to increase to goal  · Drinking carbonated beverages-no  · Sufficient exercise-no; advised her to slowly start walking program and see a   · Using NSAIDs regularly-no  · Using nicotine-no  · Using alcohol-no   Advised about the risks of alcohol s/p bariatric surgery and recommend avoiding all alcohol

## 2020-01-30 NOTE — ASSESSMENT & PLAN NOTE
-At risk for malabsorption of vitamins/minerals secondary to malabsorption and restriction of intake from bariatric surgery  -NOT Currently taking adequate postop bariatric surgery vitamin supplementation: Procare MVI with 45mg iron, calcium citrate 1500mg, not taking Vitron C   -Last set of bariatric labs completed in October2019/January 2020 and showed Vitamin D and B12 levels improved and now WNL, ferritin slightly improvement from 8 to 17, but iron still low at 35  -Start Vitron C BID on top of Procare with 45mg of iron  -Repeat iron studies in approximately 3 months  -Patient received education about the importance of adhering to a lifelong supplementation regimen to avoid vitamin/mineral deficiencies

## 2020-01-30 NOTE — PROGRESS NOTES
Assessment/Plan:    Encounter for surgical aftercare following surgery of digestive system  -s/p lap RYGB with Dr Coleman Whaley in 02/24/15 and s/p laparoscopic converted to open small bowel resection, lysis of adhesions on 01/19/19  Initial: 297 5lbs  Current: 230 7lbs  EWL: 47%  Simone: 188lbs 1 year post op  Current BMI is Body mass index is 38 39 kg/m²  · Tolerating a regular diet-yes  · Eating at least 60 grams of protein per day-yes  · Following 30/60 minute rule with liquids-yes  · Drinking at least 64 ounces of fluid per day-no; advised her to increase to goal  · Drinking carbonated beverages-no  · Sufficient exercise-no; advised her to slowly start walking program and see a   · Using NSAIDs regularly-no  · Using nicotine-no  · Using alcohol-no   Advised about the risks of alcohol s/p bariatric surgery and recommend avoiding all alcohol      Postsurgical malabsorption  -At risk for malabsorption of vitamins/minerals secondary to malabsorption and restriction of intake from bariatric surgery  -NOT Currently taking adequate postop bariatric surgery vitamin supplementation: Procare MVI with 45mg iron, calcium citrate 1500mg, not taking Vitron C   -Last set of bariatric labs completed in October2019/January 2020 and showed Vitamin D and B12 levels improved and now WNL, ferritin slightly improvement from 8 to 17, but iron still low at 35  -Start Vitron C BID on top of Procare with 45mg of iron  -Repeat iron studies in approximately 3 months  -Patient received education about the importance of adhering to a lifelong supplementation regimen to avoid vitamin/mineral deficiencies       Iron deficiency anemia  -Hgb now normalized on 01/21/20 labs  -Ferritin improved from 8 to 17, but iron still low at 35  -Continue with Vitron C BID  -Repeat iron studies in 3 months  -F/U with GI for colonoscopy    Hypertension  -Very elevated today at 160/100; she reports it is always WNL at home  -Advised her to contact her PCP asap; she agrees     Weight gain following gastric bypass surgery  -40lbs+ weight regain from her adam d/t mindless grazing, no exercise  -Tries to follow weight watchers but struggling  -Recent 5lbs weight loss since last visit end of October  -She agrees to keep food records and f/u with RD and JASMINA   -MWM referral   -PEP rallies, support groups, Back to basics classes recommended         Diagnoses and all orders for this visit:    Encounter for surgical aftercare following surgery of digestive system  -     CBC and differential; Future  -     Iron Saturation %; Future  -     Ferritin; Future    Postsurgical malabsorption  -     CBC and differential; Future  -     Iron Saturation %; Future  -     Ferritin; Future    Iron deficiency anemia  -     CBC and differential; Future  -     Iron Saturation %; Future  -     Ferritin; Future    Hypertension    Weight gain following gastric bypass surgery    Other orders  -     Probiotic Product (PROBIOTIC DAILY PO); Take by mouth          Subjective:      Patient ID: Kel Oviedo is a 61 y o  female  -s/p lap RYGB with Dr Imani Lucero in 02/24/15 and the patient was lost to f/u until she needed emergent laparoscopic converted to open small bowel resection, lysis of adhesions on 01/19/19 with Dr Margaret Marks  Presents to the office today for overdue routine follow up  Tolerating diet without issues; denies N/V, dysphagia, reflux  Overall doing Fair with weight regain - over 40lbs d/t mindless snacking, no exercise  Trying to follow [employer] but struggling with excessive "free foods "    The following portions of the patient's history were reviewed and updated as appropriate: allergies, current medications, past family history, past medical history, past social history, past surgical history and problem list     Review of Systems   Constitutional: Positive for unexpected weight change (weight regain)  Negative for chills and fever  HENT: Negative for trouble swallowing  Respiratory: Negative for cough and shortness of breath  Cardiovascular: Negative for chest pain and palpitations  Gastrointestinal: Negative for abdominal pain, constipation, diarrhea, nausea and vomiting  Neurological: Negative for dizziness  Psychiatric/Behavioral:        Denies anxiety and depression         Objective:      /100 (BP Location: Left arm, Patient Position: Sitting, Cuff Size: Large)   Pulse 90   Temp 98 9 °F (37 2 °C) (Tympanic)   Resp 18   Ht 5' 5" (1 651 m)   Wt 105 kg (230 lb 11 2 oz)   BMI 38 39 kg/m²     Colonoscopy-Not Completed - stressed the importance of regular screening       Physical Exam   Constitutional: She is oriented to person, place, and time  She appears well-developed and well-nourished  No distress  HENT:   Head: Normocephalic and atraumatic  Eyes: Pupils are equal, round, and reactive to light  No scleral icterus  Cardiovascular: Normal rate, regular rhythm and normal heart sounds  Pulmonary/Chest: Effort normal and breath sounds normal  No respiratory distress  Abdominal: Soft  Bowel sounds are normal  She exhibits no distension  There is no tenderness  No incisional hernias appreciated   Musculoskeletal: She exhibits no edema  Neurological: She is alert and oriented to person, place, and time  Skin: Skin is warm and dry  Psychiatric: She has a normal mood and affect  Nursing note and vitals reviewed  BARRIERS: none identified    GOALS:   · Continued/Maintain healthy weight loss with good nutrition intakes  · Adequate hydration with at least 64oz  fluid intake  · Normal vitamin and mineral levels  · Exercise as tolerated  · Take Vitron C once a day for about 1 week and then increase to BID - take on empty stomach and then before bed  · Keep food records and follow up with Owen Hickey RD and Larry FREEDMAN    · Follow-up in 1 year   We kindly ask that your arrive 15 minutes before your scheduled appointment time with your provider to allow our staff to room you, get your vital signs and update your chart  · Follow diet as discussed  · Get lab work done again in 3 months - iron studies  You have been given a lab slip today  Please call the office if you need a replacement  It is recommended to check with your insurance BEFORE getting labs done to make sure they are covered by your policy  Also, please check with your PCP and other providers before getting labs to avoid duplicate labs  Make sure to HOLD any multivitamins that may contain biotin and any biotin supplements FOR 5 DAYS before any labs since it can affect the results  · Follow vitamin and mineral recommendations as reviewed with you  · Call our office if you have any problems with abdominal pain especially associated with fever, chills, nausea, vomiting or any other concerns  · All  Post-bariatric surgery patients should be aware that very small quantities of any alcohol can cause impairment and it is very possible not to feel the effect  The effect can be in the system for several hours  It is also a stomach irritant  · It is advised to AVOID alcohol, Nonsteroidal antiinflammatory drugs (NSAIDS) and nicotine of all forms   Any of these can cause stomach irritation/pain

## 2020-01-31 ENCOUNTER — OFFICE VISIT (OUTPATIENT)
Dept: BARIATRICS | Facility: CLINIC | Age: 60
End: 2020-01-31
Payer: COMMERCIAL

## 2020-01-31 VITALS
DIASTOLIC BLOOD PRESSURE: 100 MMHG | TEMPERATURE: 98.9 F | WEIGHT: 230.7 LBS | BODY MASS INDEX: 38.44 KG/M2 | HEIGHT: 65 IN | SYSTOLIC BLOOD PRESSURE: 160 MMHG | RESPIRATION RATE: 18 BRPM | HEART RATE: 90 BPM

## 2020-01-31 DIAGNOSIS — D50.9 IRON DEFICIENCY ANEMIA: ICD-10-CM

## 2020-01-31 DIAGNOSIS — Z98.84 WEIGHT GAIN FOLLOWING GASTRIC BYPASS SURGERY: ICD-10-CM

## 2020-01-31 DIAGNOSIS — R63.5 WEIGHT GAIN FOLLOWING GASTRIC BYPASS SURGERY: ICD-10-CM

## 2020-01-31 DIAGNOSIS — I10 HYPERTENSION: ICD-10-CM

## 2020-01-31 DIAGNOSIS — K91.2 POSTSURGICAL MALABSORPTION: ICD-10-CM

## 2020-01-31 DIAGNOSIS — Z48.815 ENCOUNTER FOR SURGICAL AFTERCARE FOLLOWING SURGERY OF DIGESTIVE SYSTEM: Primary | ICD-10-CM

## 2020-01-31 PROCEDURE — 99214 OFFICE O/P EST MOD 30 MIN: CPT | Performed by: PHYSICIAN ASSISTANT

## 2020-01-31 NOTE — ASSESSMENT & PLAN NOTE
-Very elevated today at 160/100; she reports it is always WNL at home  -Advised her to contact her PCP asap; she agrees

## 2020-01-31 NOTE — ASSESSMENT & PLAN NOTE
-40lbs+ weight regain from her adam d/t mindless grazing, no exercise  -Tries to follow weight watchers but struggling  -Recent 5lbs weight loss since last visit end of October  -She agrees to keep food records and f/u with RD and JASMINA   -MWM referral   -PEP rallies, support groups, Back to basics classes recommended

## 2020-01-31 NOTE — PATIENT INSTRUCTIONS
GOALS:   · Continued/Maintain healthy weight loss with good nutrition intakes  · Adequate hydration with at least 64oz  fluid intake  · Normal vitamin and mineral levels  · Exercise as tolerated  · Take Vitron C once a day for about 1 week and then increase to BID - take on empty stomach and then before bed  · Keep food records and follow up with Smita Guardado RD and Silvana FREEDMAN    · Follow-up in 1 year  We kindly ask that your arrive 15 minutes before your scheduled appointment time with your provider to allow our staff to room you, get your vital signs and update your chart  · Follow diet as discussed  · Get lab work done again in 3 months - iron studies  You have been given a lab slip today  Please call the office if you need a replacement  It is recommended to check with your insurance BEFORE getting labs done to make sure they are covered by your policy  Also, please check with your PCP and other providers before getting labs to avoid duplicate labs  Make sure to HOLD any multivitamins that may contain biotin and any biotin supplements FOR 5 DAYS before any labs since it can affect the results  · Follow vitamin and mineral recommendations as reviewed with you  · Call our office if you have any problems with abdominal pain especially associated with fever, chills, nausea, vomiting or any other concerns  · All  Post-bariatric surgery patients should be aware that very small quantities of any alcohol can cause impairment and it is very possible not to feel the effect  The effect can be in the system for several hours  It is also a stomach irritant  · It is advised to AVOID alcohol, Nonsteroidal antiinflammatory drugs (NSAIDS) and nicotine of all forms   Any of these can cause stomach irritation/pain

## 2020-03-04 ENCOUNTER — OFFICE VISIT (OUTPATIENT)
Dept: BARIATRICS | Facility: CLINIC | Age: 60
End: 2020-03-04

## 2020-03-04 VITALS — BODY MASS INDEX: 37.93 KG/M2 | WEIGHT: 236 LBS | HEIGHT: 66 IN

## 2020-03-04 DIAGNOSIS — Z98.84 S/P GASTRIC BYPASS: Primary | ICD-10-CM

## 2020-03-04 PROCEDURE — RECHECK

## 2020-03-04 NOTE — PROGRESS NOTES
Discussed patient's need to make her home be as safe for her as possible  Recommendations were made and discussed  Patient admits to experiencing some food addiction behaviors  Discussed our post-op addiction support group and attendance at [facility]   Next appointments w/JASMINA 3/18/20, 3/25/20 and 4/1/20

## 2020-03-04 NOTE — PROGRESS NOTES
Bariatric Follow Up Nutrition Note    Post-op  Type of surgery  s/p lap RYGB 02/24/15  s/p laparoscopic converted to open small bowel resection, lysis of adhesions on 19  Surgeon: Dr Kaleigh Vivas  61 y o   female    There were no vitals filed for this visit  Weight (last 2 days)     Date/Time   Weight    20 1108   107 (236)            Body mass index is 38 09 kg/m²  Height: 66 inches    Estimated needs via [place]  Jeor Equation based on pt's current height, weight, age, and sex:  BMR: 1627 kcal/day  Estimated calorie needs for weight maintenance = 1953 kcal per day (sedentary)  Estimated protein needs = 70-84 grams per day (1 0-1 2 grams/kg IBW)    Weight History:  Weight on Day of Initial Dietary Evaluation: 297 5 lbs  (BMI = 48 8)  Weight on Day of Metabolic and Bariatric Surgery: 277 4 lbs  (BMI = 44 8)  Current Weight: 236 0 lbs  (BMI = 39 3)  %EWL = 43%  Simone: 188 0 lbs  (BMI 31 3) 2016  Amount of Weight Regain: about 50-60 lbs  Pt states that she recently tried Weight Watchers again last year and was able to lose 30 pounds  Pt states that she has recently regained that weight and is struggling with grazing and portion control at meals      Review of History and Medications   Past Medical History:   Diagnosis Date    Disease of thyroid gland     Hypertension     Obesity     Postgastrectomy malabsorption      Past Surgical History:   Procedure Laterality Date     SECTION      GASTRIC BYPASS      [place] LAP,DIAGNOST[place] ABDOMEN N/A 2019    Procedure: LAPAROSCOPY DIAGNOSTIC CONVERTED TO OPEN; SMALL BOWEL RESECTION; LYSIS OF ADHESIONS;  Surgeon: Alessandro Carrillo MD;  Location: [facility];  Service: General     Social History     Socioeconomic History    Marital status: /Civil Union     Spouse name: Not on file    Number of children: Not on file    Years of education: Not on file    Highest education level: Not on file Occupational History    Not on file   Social Needs    Financial resource strain: Not on file    Food insecurity:     Worry: Not on file     Inability: Not on file    Transportation needs:     Medical: Not on file     Non-medical: Not on file   Tobacco Use    Smoking status: Former Smoker     Types: Cigarettes     Last attempt to quit:      Years since quittin 1    Smokeless tobacco: Never Used   Substance and Sexual Activity    Alcohol use: Not Currently     Comment: rarely socially    Drug use: No    Sexual activity: Not on file   Lifestyle    Physical activity:     Days per week: Not on file     Minutes per session: Not on file    Stress: Not on file   Relationships    Social connections:     Talks on phone: Not on file     Gets together: Not on file     Attends Mandaen service: Not on file     Active member of club or organization: Not on file     Attends meetings of clubs or organizations: Not on file     Relationship status: Not on file    Intimate partner violence:     Fear of current or ex partner: Not on file     Emotionally abused: Not on file     Physically abused: Not on file     Forced sexual activity: Not on file   Other Topics Concern    Not on file   Social History Narrative    Not on file       Current Outpatient Medications:     calcium citrate (CALCITRATE) 950 MG tablet, Take 1 tablet by mouth daily, Disp: , Rfl:     ergocalciferol (VITAMIN D2) 50,000 units, Take 1 capsule (50,000 Units total) by mouth 2 (two) times a week with meals (Patient not taking: Reported on 2020), Disp: 24 capsule, Rfl: 0    Multiple Vitamins-Minerals (MULTIVITAMIN WITH MINERALS) tablet, Take 1 tablet by mouth daily, Disp: , Rfl:     oxyCODONE-acetaminophen (PERCOCET) 5-325 mg per tablet, Take 1 tablet by mouth every 4 (four) hours as needed for moderate pain, Disp: , Rfl:     Probiotic Product (PROBIOTIC DAILY PO), Take by mouth, Disp: , Rfl:     Food Intake and Lifestyle Assessment:  Pt is following 30/60-minute rule - no - pt states that she does not drink with her meals but she will not specifically follow the 30/60-minute rule around meals  Pt is taking required post-operative vitamins and minerals - Pt is following up with JACOB DIAZ regarding post-op MVI status  Pt is getting at least 64 oz of sugar-free, caffeine-free, noncarbonated fluids daily - no - pt states that she is drinking about 42-50 ounces of water per day  Pt is exercising - no - pt states that she does not have any injuries that limits her activity    Pt states that she works at the [employer] at [address] 2-3 days per week  Pt states that she grazes when she is at home and is not as bad when she is at work typically  Pt states that she will eat whetever is around when she is at home  Pt states that she is 100% in control of grocery shopping  Pt states that she lives with her  and her mother lives next door in an attached home  Pt states that she has other family members and young children and friends around her home frequently  Pt states that she is also a frequent baker and will bake for others and her family and friends       Food Intake Assessment completed via usual diet recall:  Wakes - 8:00am and will have her coffee by 8:15am - 1 cup regular coffee with Splenda and powder Coffee Mate  9:00/9:30am - [id number] bread with slice of American cheese and 1 eggs  Snack: bite of stromboli and [place] Fish; picking and grabbing and grazing  Lunch: 1:00pm - salad with lettuce, can of tuna, balsamic vinegar with lemon juice and olive oil, no croutons, and grilled chicken strips  Snack: picking and grabbing and grazing  Dinner: 6:30/7:00pm - vegetable stromboli; chicken cutlet with salad greens or sliced tomatoes and cucumbers and a starch - rice or pasta - on the side (small amount)  Snack: picking and grabbing and grazing; ice cream    Protein supplement: none per pt - pt states that she has a whole case of Premier Protein at home and pt states that she has not had them in a while  Estimated protein intake per day: 40-60 grams  Meals eaten away from home: takeout for dinners on the days where she works  Typical meal pattern: 3 meals per day and 3-7 snacks per day  Eating Behaviors: Consumption of high calorie/ high fat foods, Frequent snacking/ grazing and Mindless eating  Cultural or Mosque considerations: n/a    Nutrition Diagnosis  Diagnosis: Overweight / Obesity (NC-3 3)  Related to: Not ready for diet / lifestyle change, Limited adherence to nutrition-related recommendations, Physical inactivity, Excessive energy intake and Altered GI function  As Evidenced by: BMI >25, Expected anthropometric outcomes are not achieved, Excessive energy intake, Excessive fat / cholesterol intake and Unintentional weight gain      Interventions and Teaching   Patient educated on post-op nutrition guidelines  Patient educated and handouts provided    Capacity of post-surgery stomach  Adequate hydration  Weight loss plateaus/possibility of weight regain  Exercise  Nutrition considerations after surgery  Protein supplements  Meal planning and preparation  Appropriate carbohydrate, protein, and fat intake, and food/fluid choices to maximize safe weight loss, nutrient intake, and tolerance   Dietary and lifestyle changes  Possible problems with poor eating habits  Intuitive eating  Techniques for self monitoring and keeping daily food journal  Vitamin / Mineral supplementation     Education provided to: patient  Barriers to learning: No barriers identified  Readiness to change: Preparation:  (Getting ready to change)  and Relapse: (Returning to older behaviors and abandoning the new changes)   Comprehension: demonstrated understanding   Expected Compliance: good     Evaluation / Monitoring:  Eating pattern as discussed, Tolerance of nutrition prescription, Body weight, Lab values, Physical activity     Goals:  Pt is to go home and throw away all grazing items - stop buying these foods and only buy certain flavors  Pt is to have a high protein and colorful snack between lunch and dinner - provided examples on options   Pt is to avoid grabbing snacks at work as well    Pt states that she is interested in meeting with me once per week to increase accountability at this time       Time Spent:   30 Minutes

## 2020-03-13 ENCOUNTER — OFFICE VISIT (OUTPATIENT)
Dept: BARIATRICS | Facility: CLINIC | Age: 60
End: 2020-03-13

## 2020-03-13 VITALS — HEIGHT: 65 IN | BODY MASS INDEX: 39.09 KG/M2 | WEIGHT: 234.6 LBS

## 2020-03-13 DIAGNOSIS — Z98.84 S/P GASTRIC BYPASS: Primary | ICD-10-CM

## 2020-03-13 PROCEDURE — RECHECK

## 2020-03-13 NOTE — PROGRESS NOTES
Bariatric Follow Up Nutrition Note    Post-op  Type of surgery  s/p lap RYGB 02/24/15  s/p laparoscopic converted to open small bowel resection, lysis of adhesions on 19  Surgeon: Dr Iron White  61 y o   female    There were no vitals filed for this visit  Weight (last 2 days)     Date/Time   Weight    20 1124   106 (234 6)            Body mass index is 39 04 kg/m²  Height: 65 inches    Weight History:  Weight on Day of Initial Dietary Evaluation: 297 5 lbs  (BMI = 48 8)  Weight on Day of Metabolic and Bariatric Surgery: 277 4 lbs  (BMI = 44 8)  Previous Weight: 236 0 lbs  (BMI = 39 3)  Current Weight: 234 6 lbs  (BMI = 39 0)  %EWL = 44%  Simone: 188 0 lbs  (BMI 31 3) 2016  Amount of Weight Regain: about 50-60 lbs      Review of History and Medications   Past Medical History:   Diagnosis Date    Disease of thyroid gland     Hypertension     Obesity     Postgastrectomy malabsorption      Past Surgical History:   Procedure Laterality Date     SECTION      GASTRIC BYPASS      VT LAP,DIAGNOSTIC ABDOMEN N/A 2019    Procedure: LAPAROSCOPY DIAGNOSTIC CONVERTED TO OPEN; SMALL BOWEL RESECTION; LYSIS OF ADHESIONS;  Surgeon: Floridalma Garland MD;  Location: [facility] OR;  Service: General     Social History     Socioeconomic History    Marital status: /Civil Union     Spouse name: Not on file    Number of children: Not on file    Years of education: Not on file    Highest education level: Not on file   Occupational History    Not on file   Social Needs    Financial resource strain: Not on file    Food insecurity:     Worry: Not on file     Inability: Not on file    Transportation needs:     Medical: Not on file     Non-medical: Not on file   Tobacco Use    Smoking status: Former Smoker     Types: Cigarettes     Last attempt to quit:      Years since quittin 2    Smokeless tobacco: Never Used   Substance and Sexual Activity  Alcohol use: Not Currently     Comment: rarely socially    Drug use: No    Sexual activity: Not on file   Lifestyle    Physical activity:     Days per week: Not on file     Minutes per session: Not on file    Stress: Not on file   Relationships    Social connections:     Talks on phone: Not on file     Gets together: Not on file     Attends Bahai service: Not on file     Active member of club or organization: Not on file     Attends meetings of clubs or organizations: Not on file     Relationship status: Not on file    Intimate partner violence:     Fear of current or ex partner: Not on file     Emotionally abused: Not on file     Physically abused: Not on file     Forced sexual activity: Not on file   Other Topics Concern    Not on file   Social History Narrative    Not on file       Current Outpatient Medications:     calcium citrate (CALCITRATE) 950 MG tablet, Take 1 tablet by mouth daily, Disp: , Rfl:     ergocalciferol (VITAMIN D2) 50,000 units, Take 1 capsule (50,000 Units total) by mouth 2 (two) times a week with meals (Patient not taking: Reported on 1/31/2020), Disp: 24 capsule, Rfl: 0    Multiple Vitamins-Minerals (MULTIVITAMIN WITH MINERALS) tablet, Take 1 tablet by mouth daily, Disp: , Rfl:     oxyCODONE-acetaminophen (PERCOCET) 5-325 mg per tablet, Take 1 tablet by mouth every 4 (four) hours as needed for moderate pain, Disp: , Rfl:     Probiotic Product (PROBIOTIC DAILY PO), Take by mouth, Disp: , Rfl:     Food Intake and Lifestyle Assessment:  Pt is following 30/60-minute rule - no - pt states that she does not drink with her meals but she will not specifically follow the 30/60-minute rule around meals  Pt is taking required post-operative vitamins and minerals - Pt is following up with JACOB DIAZ regarding post-op MVI status  Pt is getting at least 64 oz of sugar-free, caffeine-free, noncarbonated fluids daily - yes - pt states that she has recently increased her fluid intake to roughly 50-70 ounces per day  Pt is exercising - no - pt states that she does not have any injuries that limits her activity    Pt states that she works at the [employer] at [address] 2-3 days per week  Pt states that she has been making some good changes - pt reports that she went home after our last appointment and threw away all tempting things at home and has been keeping them out of the house  Pt states that she has exchanged her cheese for fruits and has been bringing fruit to work to have as a snack between breakfast and lunch  Pt states that she started adding something with protein between lunch and dinner  Pt states that she has not started to add exercise at this time  Pt states that she started prepping SF pudding with skim milk and saving for after dinner to avoid nighttime eating  Pt states that she has also increased her fluid intake daily  Food Intake Assessment completed via usual diet recall:  Wakes - 8:00am and will have her coffee by 8:15am - 1 cup regular coffee with Splenda and powder Coffee Mate  Snack: apple or banana  Lunch: 1:00pm - salad with lettuce, can of tuna, balsamic vinegar with lemon juice and olive oil, no croutons, and grilled chicken strips  Snack: chicken breast or HB egg  Dinner: 6:30/7:00pm - vegetable stromboli; chicken cutlet with salad greens or sliced tomatoes and cucumbers and a starch - rice or pasta - on the side (small amount)  Snack: SF pudding made with skim milk    Protein supplement: none per pt   Estimated protein intake per day: 40-60 grams  Meals eaten away from home: takeout for dinners on the days where she works  Typical meal pattern: 3 meals per day and 1-3 snacks per day  Eating Behaviors: Consumption of high calorie/ high fat foods, Frequent snacking/ grazing and Mindless eating  Cultural or Druze considerations: n/a    Nutrition Diagnosis  Diagnosis: Overweight / Obesity (NC-3 3)  Related to:  Not ready for diet / lifestyle change, Limited adherence to nutrition-related recommendations, Physical inactivity, Excessive energy intake and Altered GI function  As Evidenced by: BMI >25, Expected anthropometric outcomes are not achieved, Excessive energy intake, Excessive fat / cholesterol intake and Unintentional weight gain      Interventions and Teaching   Patient educated on post-op nutrition guidelines  Patient educated and handouts provided  Capacity of post-surgery stomach  Adequate hydration  Weight loss plateaus/possibility of weight regain  Exercise  Nutrition considerations after surgery  Protein supplements  Meal planning and preparation  Appropriate carbohydrate, protein, and fat intake, and food/fluid choices to maximize safe weight loss, nutrient intake, and tolerance   Dietary and lifestyle changes  Possible problems with poor eating habits  Intuitive eating  Techniques for self monitoring and keeping daily food journal  Vitamin / Mineral supplementation     Education provided to: patient  Barriers to learning: No barriers identified  Readiness to change: Preparation:  (Getting ready to change)  and Relapse: (Returning to older behaviors and abandoning the new changes)   Comprehension: demonstrated understanding   Expected Compliance: good     Evaluation / Monitoring:  Eating pattern as discussed, Tolerance of nutrition prescription, Body weight, Lab values, Physical activity     Goals:  Pt is to continue to stop buying tempting foods and continue to only purchase flavors that she does not like  Pt is to continue to give her baked items away to others  Pt is to start to slowly increase her exercise throughout the week  Pt is to continue to have a high protein and colorful snack between lunch and dinner  Pt is to continue avoid grabbing snacks at work - continue to bring fruit for another option    Pt states that she is interested in meeting with me once per week to increase accountability at this time       Time Spent:   30 Minutes

## 2020-03-30 ENCOUNTER — TELEPHONE (OUTPATIENT)
Dept: BARIATRICS | Facility: CLINIC | Age: 60
End: 2020-03-30

## 2020-03-30 NOTE — TELEPHONE ENCOUNTER
SW followed up with patient regarding office visit scheduled with SUMA Dooley, explained that she is out at this time but during phone visit on 4/3/20 a follow up appointment with RD can be made at that time  Patient agreed and appreciated the follow up

## 2020-03-30 NOTE — TELEPHONE ENCOUNTER
SW spoke with patient regarding office visit scheduled for 4/3/20 with JASMINA FREEDMAN explained that she is covering for [employer] at this time, that patient is still welcome to come to the office but phone and video visits are also being offered  Patient agreed to a phone visit, she said she would call the office if anything changed before the appointment

## 2020-03-31 ENCOUNTER — AMB VIDEO VISIT (OUTPATIENT)
Dept: URGENT CARE | Facility: CLINIC | Age: 60
End: 2020-03-31

## 2020-03-31 DIAGNOSIS — B02.39 SHINGLES OF EYELID: Primary | ICD-10-CM

## 2020-03-31 RX ORDER — VALACYCLOVIR HYDROCHLORIDE 1 G/1
1000 TABLET, FILM COATED ORAL 3 TIMES DAILY
Qty: 30 TABLET | Refills: 0 | Status: SHIPPED | OUTPATIENT
Start: 2020-03-31 | End: 2020-12-08 | Stop reason: ALTCHOICE

## 2020-03-31 NOTE — PATIENT INSTRUCTIONS
Advised patient to start antiviral medication  Take as directed  Advised patient to call ophthalmologist for an appointment since the shingles are on her eyelid  With the recent pandemic, I am unsure if they will see her right away but I do want her in contact with an ophthalmologist   Discussed contagiousness to people that have not had chickenpox or the vaccine  If patient were to have increased pain, she should follow-up with her family doctor  Patient states that she understands instructions

## 2020-03-31 NOTE — PROGRESS NOTES
Video Visit - Lior Guerra 61 y o  female MRN: [id number]    REQUIRED DOCUMENTATION:         1  This service was provided via [facility]  2  Provider located at [address] [place] [address]  [phone]  3  [facility] provider: Kevin Sicard, PA-C   4  Identify all parties in room with patient during [facility] visit:  Self, mother and   11  After connecting through [email or web address], patient was identified by name and date of birth  Patient was then informed that this was a Telemedicine visit and that the exam was being conducted confidentially over secure lines  My office door was closed  No one else was in the room  Patient acknowledged consent and understanding of privacy and security of the Telemedicine visit  I informed the patient that I have reviewed their record in Epic and presented the opportunity for them to ask any questions regarding the visit today  The patient agreed to participate  44-year-old female he seen for a video visit  Patient states that she started having pain and burning in her right scalp 3-4 days ago  Pain has extended to the right side of her face  This morning woke up with red welts on her right eyelid, right temporal area  She denies any visual disturbances or visual changes  Has no prior history of shingles  Has not tried anything at home for the pain  Physical Exam   Constitutional: No distress  Reports no subjective fever   HENT:   Head: Normocephalic and atraumatic  Cardiovascular:   Unable to assess on video visit   Pulmonary/Chest:   Unable to assess on video visit     Diagnoses and all orders for this visit:    Shingles of eyelid  -     valACYclovir (VALTREX) 1,000 mg tablet; Take 1 tablet (1,000 mg total) by mouth 3 (three) times a day for 10 days      Patient Instructions   Advised patient to start antiviral medication  Take as directed    Advised patient to call ophthalmologist for an appointment since the shingles are on her eyelid  With the recent pandemic, I am unsure if they will see her right away but I do want her in contact with an ophthalmologist   Discussed contagiousness to people that have not had chickenpox or the vaccine  If patient were to have increased pain, she should follow-up with her family doctor  Patient states that she understands instructions  Follow up with PCP if not improved, if symptoms are worse, go to the ER

## 2020-03-31 NOTE — CARE ANYWHERE EVISITS
Visit Summary for Palomo MCKAY - Gender: Female - Date of Birth: [id number]  Date: [id number] - Duration: 4 minutes  Patient: Palomo Orozco   [facility]  Provider: Ludy Miguel PA-C    Patient Contact Information  Address  [address]  [place]  [phone]    Visit Topics  Painful, red lesions on my right eyelid and brow, right temple and scalp  [Added By: Self - [id number]]    Triage Questions   Have you had any international travel in the last 14 days? Answer [No]  Have you had any exposure to a known or expected Covid-19 patient in the last 14 days? Answer [No]  Do you have any immune system compromise or chronic lung disease? Answer [No]  Do you have any vulnerable family members in the home (infant, pregnant, cancer, elderly)? Answer Karl Bergman mother - 79yo]     Conversation Transcripts  [0A][0A] [Notification] You are connected with Ludy Miguel PA-C, Urgent Care Specialist [0A][Notification] Ted Kline is located in [place]  [0A][Notification] Ted Kline has shared health history  Marybeth Console  [0A]    Diagnosis  Other herpes zoster eye disease    Procedures  Value: 22296 Code: CPT-4 UNLISTED E&M SERVICE    Medications Prescribed    No prescriptions ordered    Electronically signed by: Olya España(NPI [id number])

## 2020-04-03 ENCOUNTER — OFFICE VISIT (OUTPATIENT)
Dept: BARIATRICS | Facility: CLINIC | Age: 60
End: 2020-04-03

## 2020-04-03 DIAGNOSIS — Z98.84 WEIGHT GAIN FOLLOWING GASTRIC BYPASS SURGERY: Primary | ICD-10-CM

## 2020-04-03 DIAGNOSIS — R63.5 WEIGHT GAIN FOLLOWING GASTRIC BYPASS SURGERY: Primary | ICD-10-CM

## 2020-04-03 PROCEDURE — RECHECK

## 2020-04-20 ENCOUNTER — TELEPHONE (OUTPATIENT)
Dept: BARIATRICS | Facility: CLINIC | Age: 60
End: 2020-04-20

## 2020-05-13 ENCOUNTER — TELEPHONE (OUTPATIENT)
Dept: BARIATRICS | Facility: CLINIC | Age: 60
End: 2020-05-13

## 2020-05-14 ENCOUNTER — APPOINTMENT (OUTPATIENT)
Dept: LAB | Facility: HOSPITAL | Age: 60
End: 2020-05-14
Payer: COMMERCIAL

## 2020-05-14 DIAGNOSIS — K91.2 POSTSURGICAL MALABSORPTION: ICD-10-CM

## 2020-05-14 DIAGNOSIS — Z48.815 ENCOUNTER FOR SURGICAL AFTERCARE FOLLOWING SURGERY OF DIGESTIVE SYSTEM: ICD-10-CM

## 2020-05-14 DIAGNOSIS — D50.9 IRON DEFICIENCY ANEMIA: ICD-10-CM

## 2020-05-14 LAB
BASOPHILS # BLD AUTO: 0.06 THOUSANDS/ΜL (ref 0–0.1)
BASOPHILS NFR BLD AUTO: 1 % (ref 0–1)
EOSINOPHIL # BLD AUTO: 0.09 THOUSAND/ΜL (ref 0–0.61)
EOSINOPHIL NFR BLD AUTO: 1 % (ref 0–6)
ERYTHROCYTE [DISTWIDTH] IN BLOOD BY AUTOMATED COUNT: 13.8 % (ref 11.6–15.1)
FERRITIN SERPL-MCNC: 39 NG/ML (ref 8–388)
HCT VFR BLD AUTO: 42.5 % (ref 34.8–46.1)
HGB BLD-MCNC: 13.4 G/DL (ref 11.5–15.4)
IMM GRANULOCYTES # BLD AUTO: 0.02 THOUSAND/UL (ref 0–0.2)
IMM GRANULOCYTES NFR BLD AUTO: 0 % (ref 0–2)
IRON SATN MFR SERPL: 19 %
IRON SERPL-MCNC: 63 UG/DL (ref 50–170)
LYMPHOCYTES # BLD AUTO: 2.6 THOUSANDS/ΜL (ref 0.6–4.47)
LYMPHOCYTES NFR BLD AUTO: 28 % (ref 14–44)
MCH RBC QN AUTO: 29.3 PG (ref 26.8–34.3)
MCHC RBC AUTO-ENTMCNC: 31.5 G/DL (ref 31.4–37.4)
MCV RBC AUTO: 93 FL (ref 82–98)
MONOCYTES # BLD AUTO: 0.56 THOUSAND/ΜL (ref 0.17–1.22)
MONOCYTES NFR BLD AUTO: 6 % (ref 4–12)
NEUTROPHILS # BLD AUTO: 5.98 THOUSANDS/ΜL (ref 1.85–7.62)
NEUTS SEG NFR BLD AUTO: 64 % (ref 43–75)
NRBC BLD AUTO-RTO: 0 /100 WBCS
PLATELET # BLD AUTO: 333 THOUSANDS/UL (ref 149–390)
PMV BLD AUTO: 9.7 FL (ref 8.9–12.7)
RBC # BLD AUTO: 4.57 MILLION/UL (ref 3.81–5.12)
TIBC SERPL-MCNC: 325 UG/DL (ref 250–450)
WBC # BLD AUTO: 9.31 THOUSAND/UL (ref 4.31–10.16)

## 2020-05-14 PROCEDURE — 82728 ASSAY OF FERRITIN: CPT

## 2020-05-14 PROCEDURE — 36415 COLL VENOUS BLD VENIPUNCTURE: CPT

## 2020-05-14 PROCEDURE — 85025 COMPLETE CBC W/AUTO DIFF WBC: CPT

## 2020-05-14 PROCEDURE — 83550 IRON BINDING TEST: CPT

## 2020-05-14 PROCEDURE — 83540 ASSAY OF IRON: CPT

## 2020-05-19 ENCOUNTER — TELEMEDICINE (OUTPATIENT)
Dept: BARIATRICS | Facility: CLINIC | Age: 60
End: 2020-05-19
Payer: COMMERCIAL

## 2020-05-19 VITALS — HEIGHT: 65 IN | BODY MASS INDEX: 39.04 KG/M2

## 2020-05-19 DIAGNOSIS — I10 HYPERTENSION: ICD-10-CM

## 2020-05-19 DIAGNOSIS — Z98.84 WEIGHT GAIN FOLLOWING GASTRIC BYPASS SURGERY: ICD-10-CM

## 2020-05-19 DIAGNOSIS — K91.2 POSTSURGICAL MALABSORPTION: ICD-10-CM

## 2020-05-19 DIAGNOSIS — Z48.815 ENCOUNTER FOR SURGICAL AFTERCARE FOLLOWING SURGERY OF DIGESTIVE SYSTEM: Primary | ICD-10-CM

## 2020-05-19 DIAGNOSIS — R63.5 WEIGHT GAIN FOLLOWING GASTRIC BYPASS SURGERY: ICD-10-CM

## 2020-05-19 DIAGNOSIS — D50.9 IRON DEFICIENCY ANEMIA: ICD-10-CM

## 2020-05-19 PROCEDURE — 99214 OFFICE O/P EST MOD 30 MIN: CPT | Performed by: PHYSICIAN ASSISTANT

## 2020-05-19 RX ORDER — OMEGA-3S/DHA/EPA/FISH OIL/D3 300MG-1000
400 CAPSULE ORAL DAILY
COMMUNITY

## 2020-05-19 RX ORDER — DOCUSATE SODIUM 100 MG/1
100 CAPSULE, LIQUID FILLED ORAL 2 TIMES DAILY
COMMUNITY

## 2020-09-21 ENCOUNTER — TRANSCRIBE ORDERS (OUTPATIENT)
Dept: GASTROENTEROLOGY | Facility: CLINIC | Age: 60
End: 2020-09-21

## 2020-12-04 ENCOUNTER — AMB VIDEO VISIT (OUTPATIENT)
Dept: OTHER | Facility: HOSPITAL | Age: 60
End: 2020-12-04
Payer: COMMERCIAL

## 2020-12-04 PROCEDURE — 99212 OFFICE O/P EST SF 10 MIN: CPT | Performed by: FAMILY MEDICINE

## 2020-12-05 ENCOUNTER — NURSE TRIAGE (OUTPATIENT)
Dept: OTHER | Facility: OTHER | Age: 60
End: 2020-12-05

## 2020-12-05 DIAGNOSIS — Z20.828 EXPOSURE TO SARS-ASSOCIATED CORONAVIRUS: Primary | ICD-10-CM

## 2020-12-07 DIAGNOSIS — Z20.828 EXPOSURE TO SARS-ASSOCIATED CORONAVIRUS: ICD-10-CM

## 2020-12-07 PROCEDURE — U0003 INFECTIOUS AGENT DETECTION BY NUCLEIC ACID (DNA OR RNA); SEVERE ACUTE RESPIRATORY SYNDROME CORONAVIRUS 2 (SARS-COV-2) (CORONAVIRUS DISEASE [COVID-19]), AMPLIFIED PROBE TECHNIQUE, MAKING USE OF HIGH THROUGHPUT TECHNOLOGIES AS DESCRIBED BY CMS-2020-01-R: HCPCS | Performed by: PHYSICIAN ASSISTANT

## 2020-12-08 ENCOUNTER — TELEMEDICINE (OUTPATIENT)
Dept: FAMILY MEDICINE CLINIC | Facility: CLINIC | Age: 60
End: 2020-12-08
Payer: COMMERCIAL

## 2020-12-08 DIAGNOSIS — U07.1 COVID-19 VIRUS INFECTION: Primary | ICD-10-CM

## 2020-12-08 PROBLEM — S82.024A CLOSED NONDISPLACED LONGITUDINAL FRACTURE OF RIGHT PATELLA: Status: RESOLVED | Noted: 2019-05-02 | Resolved: 2020-12-08

## 2020-12-08 PROBLEM — Z48.815 ENCOUNTER FOR SURGICAL AFTERCARE FOLLOWING SURGERY OF DIGESTIVE SYSTEM: Status: RESOLVED | Noted: 2020-01-30 | Resolved: 2020-12-08

## 2020-12-08 LAB — SARS-COV-2 RNA SPEC QL NAA+PROBE: DETECTED

## 2020-12-08 PROCEDURE — 99213 OFFICE O/P EST LOW 20 MIN: CPT | Performed by: PHYSICIAN ASSISTANT

## 2020-12-09 ENCOUNTER — TELEPHONE (OUTPATIENT)
Dept: FAMILY MEDICINE CLINIC | Facility: CLINIC | Age: 60
End: 2020-12-09

## 2021-03-01 ENCOUNTER — TELEPHONE (OUTPATIENT)
Dept: BARIATRICS | Facility: CLINIC | Age: 61
End: 2021-03-01

## 2021-03-01 NOTE — TELEPHONE ENCOUNTER
The patient called the office stating that she would like to speak to the dietitian in regards to a specific diet plan and if it can be done as a bariatric patient   She can be reached at her work extension at [employer]

## 2021-03-10 NOTE — PLAN OF CARE
"[facility] [address] OFFICE VISIT    Chief Complaint   Patient presents with   â¢ Establish Care   â¢ Referral     ob, cyst on ovary     HISTORY OF PRESENT ILLNESS     Here to establish care and discuss recent ER visit. Establish care: has not seen PCP in about 3 years. Looking to establish care with me today. ER visit: was seen recently for back and flank pain. Was also having pelvic cramping. As part of her work up, had CT abdomen and pelvis which showed a 1.6cm right ovarian follicle or cyst. Currently the pain comes and goes. Menses is due in a few days. Ulcerative colitis: is not seeing a GI doctor currently. Gets alternating constipation and diarrhea. Has also had blood in stools. Is not sure if the current abdominal issues is due to ulcerative colitis. MEDICATIONS, ALLERGIES, PAST MEDICAL, SURGICAL, FAMILY AND SOCIAL HISTORY     I have reviewed the past medical history, surgical history, family history, social history, medications and allergies as obtained by my nursing staff and support staff and agree with their documentation as noted in the medical records on 3/10/2021    REVIEW OF SYSTEMS     Constitutional: POSITIVE FOR WEIGHT GAIN   Gastrointestinal: POSITIVE FOR ABDOMINAL PAIN AND CRAMPING, ALTERNATING DIARRHEA AND CONSTIPATION   Psychiatric: POSITIVE FOR ANXIETY   Allergy/Immunology: As noted in patient's chart.     PHYSICAL EXAM     Visit Vitals  /70   Pulse 79   Temp 98.2 Â°F (36.8 Â°C)   Resp 18   Ht 5' 9"" (1.753 m)   Wt 109.3 kg   LMP 02/10/2021   BMI 35.59 kg/mÂ²          Constitutional:  Appears well kept and in no acute distress  Eyes: Normal conjunctivae and sclerae  Ear/Nose/Throat: Well-formed external ear and pinna  Skin: No visible rashes or lesions  Respiratory: Normal respiratory effort  Musculoskeletal: Normal gait; appears to move all 4 extremities equally  Neurologic: Cranial nerves 2-12 grossly intact with no focal deficits  Psychiatric:  " Problem: PHYSICAL THERAPY ADULT  Goal: Performs mobility at highest level of function for planned discharge setting  See evaluation for individualized goals  Treatment/Interventions: Functional transfer training, LE strengthening/ROM, Elevations, Therapeutic exercise, Endurance training, Patient/family training, Equipment eval/education, Bed mobility, Gait training, Spoke to nursing, Spoke to advanced practitioner, Family  Equipment Recommended:  (none anticipated)       See flowsheet documentation for full assessment, interventions and recommendations  Prognosis: Good  Problem List: Decreased strength, Decreased endurance, Impaired balance, Decreased mobility, Pain  Assessment: Pt is 62 y o  female seen for PT evaluation on 1/21/2019 s/p admit to [facility] on 1/20/2019 w/ Ischemic necrosis of small bowel ([facility] [address] )  Pt presents with 1 day h/o L upper and B lower quadrant abdominal pain  ED revealed cholelithiasis and possible internal hernia on CT  Patient underwent exploratory laparoscopic converted to open laparotomy for lysis of adhesions and resection of small bowel for necrosis related to an internal hernia  PT consulted to assess pt's functional mobility and d/c needs  Order placed for PT eval and tx, w/ up w/ A order  Performed at least 2 patient identifiers during session: Name and wristband  Comorbidities affecting pt's physical performance at time of assessment include: disease of thyroid gland, HTN  PTA, pt was independent w/ all functional mobility w/ no AD/DME, ambulates unrestricted distances and all terrain, has 0 CLAUDY, lives w/  and mother in 1 level home and works part time  Personal factors affecting pt at time of IE include: inability to navigate community distances, pain   Please find objective findings from PT assessment regarding body systems outlined above with impairments and limitations including weakness, impaired balance, decreased endurance, gait deviations and pain, as well Normal mood and affect; normal judgment; alert and oriented times 3    CT abdomen/pelvis 1/24/2021  IMPRESSION:  Â   1. No obstructive urolithiasis or hydroureteronephrosis. 2. Colonic diverticulosis without convincing evidence of acute  diverticulitis. 3. Dominant right ovarian follicle or cyst, measures 1.6 cm. Â     ASSESSMENT/PLAN     Lizz Barkley was seen today for establish care and referral.    Diagnoses and all orders for this visit:    Cyst of right ovary: possible cyst vs follicle seen on CT; discussed that ultrasound is a more sensitive test for this; likely not the main source of her pain  -     US PELVIS COMPLETE NON OB; Future    Ulcerative colitis without complications, unspecified location (CMS/HCC): likely the cause of patient's current abdominal pain and discomfort; has not seen GI doctor in many years; recent lab work from ER, CMP and CBC in mostly normal range  -     [address] [place] [address] [place]  -     [facility]; Future    Family history of diabetes mellitus (DM)  -     GLYCOHEMOGLOBIN; Future    Adult BMI 35.0-35.9 kg/sq m  -     THYROID STIMULATING HORMONE REFLEX; Future    Encounter to establish care: reviewed previous health histories and updated chart accordingly       Return in about 2 days (around 3/12/2021) for Annual exam with pap and discuss anxiety.     Jewel [place],   3/10/2021 as mobility assessment (need for SBA assist w/ all phases of mobility when usually ambulating independently and need for cueing for mobility technique)  The following objective measures performed on IE also reveal limitations: Barthel Index: 50/100 and Modified Whitman: 3 (moderate disability)  Pt's clinical presentation is currently unstable/unpredictable seen in pt's presentation of need for input for task focus and mobility technique, abdominal pain impacting overall mobility status, on telemetry monitoring and ongoing medical assessment  Pt to benefit from continued PT tx to address deficits as defined above and maximize level of functional independent mobility and consistency  From PT/mobility standpoint, recommendation at time of d/c would be anticipate no needs pending progress in order to facilitate return to PLOF  Barriers to Discharge: None     Recommendation: Home with family support (further PT needs TBD pending progress, none anticipated)     PT - OK to Discharge: No    See flowsheet documentation for full assessment

## 2021-03-15 ENCOUNTER — IMMUNIZATIONS (OUTPATIENT)
Dept: FAMILY MEDICINE CLINIC | Facility: HOSPITAL | Age: 61
End: 2021-03-15

## 2021-03-15 DIAGNOSIS — Z23 ENCOUNTER FOR IMMUNIZATION: Primary | ICD-10-CM

## 2021-03-15 PROCEDURE — 0001A SARS-COV-2 / COVID-19 MRNA VACCINE (PFIZER-BIONTECH) 30 MCG: CPT

## 2021-03-15 PROCEDURE — 91300 SARS-COV-2 / COVID-19 MRNA VACCINE (PFIZER-BIONTECH) 30 MCG: CPT

## 2021-03-23 DIAGNOSIS — Z20.822 ENCOUNTER FOR SCREENING LABORATORY TESTING FOR COVID-19 VIRUS IN ASYMPTOMATIC PATIENT: Primary | ICD-10-CM

## 2021-03-23 DIAGNOSIS — Z20.822 ENCOUNTER FOR SCREENING LABORATORY TESTING FOR COVID-19 VIRUS IN ASYMPTOMATIC PATIENT: ICD-10-CM

## 2021-03-23 PROCEDURE — U0005 INFEC AGEN DETEC AMPLI PROBE: HCPCS | Performed by: PHYSICIAN ASSISTANT

## 2021-03-23 PROCEDURE — U0003 INFECTIOUS AGENT DETECTION BY NUCLEIC ACID (DNA OR RNA); SEVERE ACUTE RESPIRATORY SYNDROME CORONAVIRUS 2 (SARS-COV-2) (CORONAVIRUS DISEASE [COVID-19]), AMPLIFIED PROBE TECHNIQUE, MAKING USE OF HIGH THROUGHPUT TECHNOLOGIES AS DESCRIBED BY CMS-2020-01-R: HCPCS | Performed by: PHYSICIAN ASSISTANT

## 2021-03-24 LAB — SARS-COV-2 RNA RESP QL NAA+PROBE: NEGATIVE

## 2021-04-07 ENCOUNTER — IMMUNIZATIONS (OUTPATIENT)
Dept: FAMILY MEDICINE CLINIC | Facility: HOSPITAL | Age: 61
End: 2021-04-07

## 2021-04-07 DIAGNOSIS — Z23 ENCOUNTER FOR IMMUNIZATION: Primary | ICD-10-CM

## 2021-04-07 PROCEDURE — 0002A SARS-COV-2 / COVID-19 MRNA VACCINE (PFIZER-BIONTECH) 30 MCG: CPT

## 2021-04-07 PROCEDURE — 91300 SARS-COV-2 / COVID-19 MRNA VACCINE (PFIZER-BIONTECH) 30 MCG: CPT

## 2021-10-28 ENCOUNTER — OFFICE VISIT (OUTPATIENT)
Dept: FAMILY MEDICINE CLINIC | Facility: CLINIC | Age: 61
End: 2021-10-28
Payer: COMMERCIAL

## 2021-10-28 VITALS
WEIGHT: 254.2 LBS | TEMPERATURE: 97.5 F | HEART RATE: 94 BPM | OXYGEN SATURATION: 97 % | SYSTOLIC BLOOD PRESSURE: 158 MMHG | BODY MASS INDEX: 42.35 KG/M2 | DIASTOLIC BLOOD PRESSURE: 98 MMHG | HEIGHT: 65 IN

## 2021-10-28 DIAGNOSIS — Z76.89 ENCOUNTER TO ESTABLISH CARE: ICD-10-CM

## 2021-10-28 DIAGNOSIS — I10 PRIMARY HYPERTENSION: Primary | ICD-10-CM

## 2021-10-28 DIAGNOSIS — E03.9 HYPOTHYROIDISM, UNSPECIFIED TYPE: ICD-10-CM

## 2021-10-28 DIAGNOSIS — Z00.00 HEALTHCARE MAINTENANCE: ICD-10-CM

## 2021-10-28 DIAGNOSIS — Z12.11 COLON CANCER SCREENING: ICD-10-CM

## 2021-10-28 DIAGNOSIS — Z12.31 SCREENING MAMMOGRAM, ENCOUNTER FOR: ICD-10-CM

## 2021-10-28 PROCEDURE — 99214 OFFICE O/P EST MOD 30 MIN: CPT | Performed by: NURSE PRACTITIONER

## 2021-10-28 RX ORDER — METOPROLOL SUCCINATE 25 MG/1
25 TABLET, EXTENDED RELEASE ORAL DAILY
Qty: 30 TABLET | Refills: 0 | Status: SHIPPED | OUTPATIENT
Start: 2021-10-28 | End: 2021-11-23 | Stop reason: SDUPTHER

## 2021-11-03 ENCOUNTER — APPOINTMENT (OUTPATIENT)
Dept: LAB | Facility: CLINIC | Age: 61
End: 2021-11-03
Payer: COMMERCIAL

## 2021-11-03 DIAGNOSIS — E03.9 HYPOTHYROIDISM, UNSPECIFIED TYPE: ICD-10-CM

## 2021-11-03 DIAGNOSIS — Z00.00 HEALTHCARE MAINTENANCE: ICD-10-CM

## 2021-11-03 DIAGNOSIS — I10 PRIMARY HYPERTENSION: ICD-10-CM

## 2021-11-03 LAB
ALBUMIN SERPL BCP-MCNC: 3.3 G/DL (ref 3.5–5)
ALP SERPL-CCNC: 90 U/L (ref 46–116)
ALT SERPL W P-5'-P-CCNC: 25 U/L (ref 12–78)
ANION GAP SERPL CALCULATED.3IONS-SCNC: 1 MMOL/L (ref 4–13)
AST SERPL W P-5'-P-CCNC: 14 U/L (ref 5–45)
BASOPHILS # BLD AUTO: 0.07 THOUSANDS/ΜL (ref 0–0.1)
BASOPHILS NFR BLD AUTO: 1 % (ref 0–1)
BILIRUB SERPL-MCNC: 0.3 MG/DL (ref 0.2–1)
BUN SERPL-MCNC: 19 MG/DL (ref 5–25)
CALCIUM ALBUM COR SERPL-MCNC: 10.3 MG/DL (ref 8.3–10.1)
CALCIUM SERPL-MCNC: 9.7 MG/DL (ref 8.3–10.1)
CHLORIDE SERPL-SCNC: 107 MMOL/L (ref 100–108)
CHOLEST SERPL-MCNC: 219 MG/DL (ref 50–200)
CO2 SERPL-SCNC: 30 MMOL/L (ref 21–32)
CREAT SERPL-MCNC: 0.76 MG/DL (ref 0.6–1.3)
EOSINOPHIL # BLD AUTO: 0.17 THOUSAND/ΜL (ref 0–0.61)
EOSINOPHIL NFR BLD AUTO: 2 % (ref 0–6)
ERYTHROCYTE [DISTWIDTH] IN BLOOD BY AUTOMATED COUNT: 13.3 % (ref 11.6–15.1)
GFR SERPL CREATININE-BSD FRML MDRD: 85 ML/MIN/1.73SQ M
GLUCOSE P FAST SERPL-MCNC: 86 MG/DL (ref 65–99)
HCT VFR BLD AUTO: 42.8 % (ref 34.8–46.1)
HDLC SERPL-MCNC: 44 MG/DL
HGB BLD-MCNC: 13.5 G/DL (ref 11.5–15.4)
IMM GRANULOCYTES # BLD AUTO: 0.04 THOUSAND/UL (ref 0–0.2)
IMM GRANULOCYTES NFR BLD AUTO: 0 % (ref 0–2)
LDLC SERPL CALC-MCNC: 133 MG/DL (ref 0–100)
LYMPHOCYTES # BLD AUTO: 2.79 THOUSANDS/ΜL (ref 0.6–4.47)
LYMPHOCYTES NFR BLD AUTO: 26 % (ref 14–44)
MCH RBC QN AUTO: 28.8 PG (ref 26.8–34.3)
MCHC RBC AUTO-ENTMCNC: 31.5 G/DL (ref 31.4–37.4)
MCV RBC AUTO: 92 FL (ref 82–98)
MONOCYTES # BLD AUTO: 0.67 THOUSAND/ΜL (ref 0.17–1.22)
MONOCYTES NFR BLD AUTO: 6 % (ref 4–12)
NEUTROPHILS # BLD AUTO: 6.97 THOUSANDS/ΜL (ref 1.85–7.62)
NEUTS SEG NFR BLD AUTO: 65 % (ref 43–75)
NONHDLC SERPL-MCNC: 175 MG/DL
NRBC BLD AUTO-RTO: 0 /100 WBCS
PLATELET # BLD AUTO: 277 THOUSANDS/UL (ref 149–390)
PMV BLD AUTO: 11.4 FL (ref 8.9–12.7)
POTASSIUM SERPL-SCNC: 4.7 MMOL/L (ref 3.5–5.3)
PROT SERPL-MCNC: 7.5 G/DL (ref 6.4–8.2)
RBC # BLD AUTO: 4.68 MILLION/UL (ref 3.81–5.12)
SODIUM SERPL-SCNC: 138 MMOL/L (ref 136–145)
T4 FREE SERPL-MCNC: 0.82 NG/DL (ref 0.76–1.46)
TRIGL SERPL-MCNC: 209 MG/DL
TSH SERPL DL<=0.05 MIU/L-ACNC: 8.25 UIU/ML (ref 0.36–3.74)
WBC # BLD AUTO: 10.71 THOUSAND/UL (ref 4.31–10.16)

## 2021-11-03 PROCEDURE — 85025 COMPLETE CBC W/AUTO DIFF WBC: CPT

## 2021-11-03 PROCEDURE — 84443 ASSAY THYROID STIM HORMONE: CPT

## 2021-11-03 PROCEDURE — 36415 COLL VENOUS BLD VENIPUNCTURE: CPT

## 2021-11-03 PROCEDURE — 84439 ASSAY OF FREE THYROXINE: CPT

## 2021-11-03 PROCEDURE — 80061 LIPID PANEL: CPT

## 2021-11-03 PROCEDURE — 80053 COMPREHEN METABOLIC PANEL: CPT

## 2021-11-23 ENCOUNTER — OFFICE VISIT (OUTPATIENT)
Dept: FAMILY MEDICINE CLINIC | Facility: CLINIC | Age: 61
End: 2021-11-23
Payer: COMMERCIAL

## 2021-11-23 VITALS
HEIGHT: 65 IN | BODY MASS INDEX: 42.88 KG/M2 | SYSTOLIC BLOOD PRESSURE: 148 MMHG | HEART RATE: 76 BPM | TEMPERATURE: 98.1 F | WEIGHT: 257.4 LBS | DIASTOLIC BLOOD PRESSURE: 98 MMHG

## 2021-11-23 DIAGNOSIS — Z00.00 ANNUAL PHYSICAL EXAM: Primary | ICD-10-CM

## 2021-11-23 DIAGNOSIS — I10 PRIMARY HYPERTENSION: ICD-10-CM

## 2021-11-23 DIAGNOSIS — E03.9 ACQUIRED HYPOTHYROIDISM: ICD-10-CM

## 2021-11-23 PROCEDURE — 99396 PREV VISIT EST AGE 40-64: CPT | Performed by: NURSE PRACTITIONER

## 2021-11-23 RX ORDER — LEVOTHYROXINE SODIUM 0.03 MG/1
25 TABLET ORAL
Qty: 30 TABLET | Refills: 5 | Status: SHIPPED | OUTPATIENT
Start: 2021-11-23 | End: 2022-01-24 | Stop reason: SDUPTHER

## 2021-11-23 RX ORDER — METOPROLOL SUCCINATE 25 MG/1
25 TABLET, EXTENDED RELEASE ORAL DAILY
Qty: 90 TABLET | Refills: 3 | Status: SHIPPED | OUTPATIENT
Start: 2021-11-23 | End: 2022-01-11 | Stop reason: SDUPTHER

## 2021-12-02 LAB — COLOGUARD RESULT REPORTABLE: NEGATIVE

## 2021-12-07 ENCOUNTER — TELEPHONE (OUTPATIENT)
Dept: FAMILY MEDICINE CLINIC | Facility: CLINIC | Age: 61
End: 2021-12-07

## 2022-01-14 ENCOUNTER — TELEPHONE (OUTPATIENT)
Dept: FAMILY MEDICINE CLINIC | Facility: CLINIC | Age: 62
End: 2022-01-14

## 2022-01-14 NOTE — TELEPHONE ENCOUNTER
Left message for patient to call back in regards to metoprolol medication  I also spoke with Renetta Olivas from [address] [place] [address]  She informed me that they will contact the patient to have the medication sent up to the [facility]

## 2022-01-24 DIAGNOSIS — E03.9 ACQUIRED HYPOTHYROIDISM: ICD-10-CM

## 2022-01-26 RX ORDER — LEVOTHYROXINE SODIUM 0.03 MG/1
25 TABLET ORAL
Qty: 30 TABLET | Refills: 5 | Status: SHIPPED | OUTPATIENT
Start: 2022-01-26 | End: 2022-02-02 | Stop reason: DRUGHIGH

## 2022-01-31 ENCOUNTER — APPOINTMENT (OUTPATIENT)
Dept: LAB | Facility: CLINIC | Age: 62
End: 2022-01-31
Payer: COMMERCIAL

## 2022-01-31 DIAGNOSIS — E03.9 ACQUIRED HYPOTHYROIDISM: ICD-10-CM

## 2022-01-31 LAB
T4 FREE SERPL-MCNC: 0.89 NG/DL (ref 0.76–1.46)
TSH SERPL DL<=0.05 MIU/L-ACNC: 10.1 UIU/ML (ref 0.36–3.74)

## 2022-01-31 PROCEDURE — 36415 COLL VENOUS BLD VENIPUNCTURE: CPT

## 2022-01-31 PROCEDURE — 84443 ASSAY THYROID STIM HORMONE: CPT

## 2022-01-31 PROCEDURE — 84439 ASSAY OF FREE THYROXINE: CPT

## 2022-02-01 ENCOUNTER — TELEPHONE (OUTPATIENT)
Dept: FAMILY MEDICINE CLINIC | Facility: CLINIC | Age: 62
End: 2022-02-01

## 2022-02-01 NOTE — TELEPHONE ENCOUNTER
Patient wants to know if you need her to come in for her appointment on Thursday? She thinks its just to go over her labs and she already got those results  She just needs to know if she should change the dosage

## 2022-02-02 DIAGNOSIS — E03.9 HYPOTHYROIDISM, UNSPECIFIED TYPE: ICD-10-CM

## 2022-02-02 NOTE — TELEPHONE ENCOUNTER
Patient called back in about appointment on Thursday   Would like to know if you are able to just review her TSH blood work and send her message on what you would like to do about the dosage on medication on whether you will up the dosage

## 2022-02-02 NOTE — TELEPHONE ENCOUNTER
Patient called back in she is aware of the dosage being elevated and that her appointment has been cancelled for tomorrow

## 2022-02-02 NOTE — TELEPHONE ENCOUNTER
Left patient to call in   If she does please let her know that Eladia increased dosage and cancelled appointment for tomorrow

## 2022-02-02 NOTE — TELEPHONE ENCOUNTER
Patient is requesting for script to be sent to [facility] pharmacy

## 2022-02-04 ENCOUNTER — HOSPITAL ENCOUNTER (OUTPATIENT)
Dept: MAMMOGRAPHY | Facility: CLINIC | Age: 62
Discharge: HOME | End: 2022-02-04
Payer: COMMERCIAL

## 2022-02-04 DIAGNOSIS — Z12.31 SCREENING MAMMOGRAM, ENCOUNTER FOR: ICD-10-CM

## 2022-02-04 PROCEDURE — 77067 SCR MAMMO BI INCL CAD: CPT

## 2022-02-04 RX ORDER — LEVOTHYROXINE SODIUM 0.05 MG/1
50 TABLET ORAL
Qty: 90 TABLET | Refills: 3 | Status: SHIPPED | OUTPATIENT
Start: 2022-02-04 | End: 2022-06-10 | Stop reason: SDUPTHER

## 2022-06-08 ENCOUNTER — APPOINTMENT (OUTPATIENT)
Dept: LAB | Facility: CLINIC | Age: 62
End: 2022-06-08
Payer: COMMERCIAL

## 2022-06-08 DIAGNOSIS — E03.9 HYPOTHYROIDISM, UNSPECIFIED TYPE: ICD-10-CM

## 2022-06-08 LAB
T4 FREE SERPL-MCNC: 0.95 NG/DL (ref 0.76–1.46)
TSH SERPL DL<=0.05 MIU/L-ACNC: 5.65 UIU/ML (ref 0.45–4.5)

## 2022-06-08 PROCEDURE — 84443 ASSAY THYROID STIM HORMONE: CPT

## 2022-06-08 PROCEDURE — 36415 COLL VENOUS BLD VENIPUNCTURE: CPT

## 2022-06-08 PROCEDURE — 84439 ASSAY OF FREE THYROXINE: CPT

## 2022-06-10 DIAGNOSIS — E03.9 HYPOTHYROIDISM, UNSPECIFIED TYPE: ICD-10-CM

## 2022-06-10 RX ORDER — LEVOTHYROXINE SODIUM 0.05 MG/1
50 TABLET ORAL
Qty: 90 TABLET | Refills: 3 | Status: SHIPPED | OUTPATIENT
Start: 2022-06-10 | End: 2023-01-10 | Stop reason: SDUPTHER

## 2022-08-17 ENCOUNTER — APPOINTMENT (OUTPATIENT)
Dept: LAB | Facility: CLINIC | Age: 62
End: 2022-08-17

## 2022-08-17 DIAGNOSIS — Z00.8 HEALTH EXAMINATION IN POPULATION SURVEY: ICD-10-CM

## 2022-08-17 LAB
CHOLEST SERPL-MCNC: 219 MG/DL
HDLC SERPL-MCNC: 44 MG/DL
LDLC SERPL CALC-MCNC: 135 MG/DL (ref 0–100)
NONHDLC SERPL-MCNC: 175 MG/DL
TRIGL SERPL-MCNC: 198 MG/DL

## 2022-08-17 PROCEDURE — 36415 COLL VENOUS BLD VENIPUNCTURE: CPT

## 2022-08-17 PROCEDURE — 83036 HEMOGLOBIN GLYCOSYLATED A1C: CPT

## 2022-08-17 PROCEDURE — 80061 LIPID PANEL: CPT

## 2022-08-18 ENCOUNTER — OFFICE VISIT (OUTPATIENT)
Dept: FAMILY MEDICINE CLINIC | Facility: CLINIC | Age: 62
End: 2022-08-18
Payer: COMMERCIAL

## 2022-08-18 VITALS
HEART RATE: 78 BPM | OXYGEN SATURATION: 97 % | BODY MASS INDEX: 43.65 KG/M2 | DIASTOLIC BLOOD PRESSURE: 86 MMHG | SYSTOLIC BLOOD PRESSURE: 140 MMHG | TEMPERATURE: 97.5 F | WEIGHT: 262 LBS | HEIGHT: 65 IN

## 2022-08-18 DIAGNOSIS — D58.2 ELEVATED HEMOGLOBIN (HCC): ICD-10-CM

## 2022-08-18 DIAGNOSIS — E66.01 CLASS 3 SEVERE OBESITY DUE TO EXCESS CALORIES IN ADULT, UNSPECIFIED BMI, UNSPECIFIED WHETHER SERIOUS COMORBIDITY PRESENT (HCC): Primary | ICD-10-CM

## 2022-08-18 LAB
EST. AVERAGE GLUCOSE BLD GHB EST-MCNC: 117 MG/DL
HBA1C MFR BLD: 5.7 %

## 2022-08-18 PROCEDURE — 99214 OFFICE O/P EST MOD 30 MIN: CPT | Performed by: NURSE PRACTITIONER

## 2022-08-18 RX ORDER — SEMAGLUTIDE 1.34 MG/ML
0.5 INJECTION, SOLUTION SUBCUTANEOUS WEEKLY
Qty: 6 ML | Refills: 0 | Status: SHIPPED | OUTPATIENT
Start: 2022-08-18 | End: 2022-08-31

## 2022-08-19 PROBLEM — E66.813 CLASS 3 SEVERE OBESITY DUE TO EXCESS CALORIES IN ADULT (HCC): Status: ACTIVE | Noted: 2022-08-19

## 2022-08-19 PROBLEM — E66.01 CLASS 3 SEVERE OBESITY DUE TO EXCESS CALORIES IN ADULT (HCC): Status: ACTIVE | Noted: 2022-08-19

## 2022-08-19 NOTE — ASSESSMENT & PLAN NOTE
Blood pressure is slightly elevated in office  Heart rate is good  Will continue same dose of medication  Discussed the benefits of weight loss  If it continues to be elevated in 4 weeks will evaluate the need to adjust medications    Continue low-salt heart healthy diet continue exercise

## 2022-08-19 NOTE — PROGRESS NOTES
Assessment/Plan:  BMI Counseling: Body mass index is 43 6 kg/m²  The BMI is above normal  Nutrition recommendations include decreasing portion sizes, encouraging healthy choices of fruits and vegetables, decreasing fast food intake, consuming healthier snacks, limiting drinks that contain sugar, moderation in carbohydrate intake, increasing intake of lean protein, reducing intake of saturated and trans fat and reducing intake of cholesterol  Exercise recommendations include exercising 3-5 times per week and strength training exercises  No pharmacotherapy was ordered  Rationale for BMI follow-up plan is due to patient being overweight or obese  Depression Screening and Follow-up Plan: Patient was screened for depression during today's encounter  They screened negative with a PHQ-2 score of 0  Class 3 severe obesity due to excess calories in adult [facility])  Patient has gained more than 40 lb in the past few months  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, 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  Does admit to having increased snacks  Patient did have gastric bypass  Patient restarted weight watchers  Discussed various pharmacological intervention  Patient is unable to take phentermine due to hypertensive issues  Patient had tried Wellbutrin in the past was not effective  Ozempic prescribed education provided regarding the medication  Continue weight watchers  Continue exercise, maintain good sleep  Hypertension  Blood pressure is slightly elevated in office  Heart rate is good  Will continue same dose of medication  Discussed the benefits of weight loss  If it continues to be elevated in 4 weeks will evaluate the need to adjust medications    Continue low-salt heart healthy diet continue exercise         Problem List Items Addressed This Visit        Other    Class 3 severe obesity due to excess calories in [facility]) - Primary     Patient has gained more than 40 lb in the past few months  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, 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  Does admit to having increased snacks  Patient did have gastric bypass  Patient restarted weight watchers  Discussed various pharmacological intervention  Patient is unable to take phentermine due to hypertensive issues  Patient had tried Wellbutrin in the past was not effective  Ozempic prescribed education provided regarding the medication  Continue weight watchers  Continue exercise, maintain good sleep  Relevant Medications    Semaglutide,0 25 or 0 5MG/DOS, (Ozempic, 0 25 or 0 5 MG/DOSE,) 2 MG/1 5ML SOPN      Other Visit Diagnoses     Elevated hemoglobin (HCC)        Relevant Medications    Semaglutide,0 25 or 0 5MG/DOS, (Ozempic, 0 25 or 0 5 MG/DOSE,) 2 MG/1 5ML SOPN            Subjective:      Patient ID: Jen Dowling is a 58 y o  female  Patient is here to discuss weight loss  Patient has gained more than 20 lb in the last few months  Patient states she has restarted weight watchers  Status post bariatric surgery  Does report eating a lot of snacks, her job is more sedentary  The following portions of the patient's history were reviewed and updated as appropriate: allergies, current medications, past family history, past medical history, past social history, past surgical history and problem list     Review of Systems   Constitutional: Negative  HENT: Negative  Eyes: Negative  Respiratory: Negative  Cardiovascular: Negative  Gastrointestinal: Negative  Endocrine: Negative  Genitourinary: Negative  Allergic/Immunologic: Negative  Neurological: Negative  Psychiatric/Behavioral: Negative  Objective:      /86   Pulse 78   Temp 97 5 °F (36 4 °C) (Temporal)   Ht 5' 5" (1 651 m)   Wt 119 kg (262 lb)   SpO2 97%   BMI 43 60 kg/m²          Physical Exam  Vitals and nursing note reviewed     Constitutional:       Appearance: She is well-developed  She is obese  HENT:      Head: Normocephalic and atraumatic  Cardiovascular:      Rate and Rhythm: Normal rate and regular rhythm  Pulses: Normal pulses  Heart sounds: Normal heart sounds  Pulmonary:      Effort: Pulmonary effort is normal       Breath sounds: Normal breath sounds  Abdominal:      General: Bowel sounds are normal       Palpations: Abdomen is soft  Musculoskeletal:         General: Normal range of motion  Cervical back: Normal range of motion  Skin:     General: Skin is warm and dry  Neurological:      Mental Status: She is alert and oriented to person, place, and time  Psychiatric:         Mood and Affect: Mood normal          Thought Content:  Thought content normal          Judgment: Judgment normal

## 2022-08-19 NOTE — ASSESSMENT & PLAN NOTE
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

## 2022-08-31 ENCOUNTER — TELEPHONE (OUTPATIENT)
Dept: FAMILY MEDICINE CLINIC | Facility: CLINIC | Age: 62
End: 2022-08-31

## 2022-08-31 DIAGNOSIS — E66.01 CLASS 3 SEVERE OBESITY DUE TO EXCESS CALORIES WITH SERIOUS COMORBIDITY IN ADULT, UNSPECIFIED BMI (HCC): Primary | ICD-10-CM

## 2022-08-31 NOTE — TELEPHONE ENCOUNTER
Patient called to followup on the status of her Semaglutide,0 25 or 0 5MG/DOS, (Ozempic, 0 25 or 0 5 MG/DOSE,) 2 MG/1 5ML SOPN  Medication is still not at [address]  Can you please followup with patient?     Thank you

## 2022-09-16 DIAGNOSIS — E66.01 CLASS 3 SEVERE OBESITY DUE TO EXCESS CALORIES WITH SERIOUS COMORBIDITY IN ADULT, UNSPECIFIED BMI (HCC): ICD-10-CM

## 2022-09-16 NOTE — TELEPHONE ENCOUNTER
Prior Authorization was approved  Patient is requesting that Rx be sent to [facility]    I will cancel Rx at [facility]

## 2022-10-13 ENCOUNTER — OFFICE VISIT (OUTPATIENT)
Dept: FAMILY MEDICINE CLINIC | Facility: CLINIC | Age: 62
End: 2022-10-13
Payer: COMMERCIAL

## 2022-10-13 VITALS
WEIGHT: 252.2 LBS | BODY MASS INDEX: 42.02 KG/M2 | RESPIRATION RATE: 18 BRPM | TEMPERATURE: 98.1 F | HEART RATE: 74 BPM | OXYGEN SATURATION: 98 % | DIASTOLIC BLOOD PRESSURE: 86 MMHG | HEIGHT: 65 IN | SYSTOLIC BLOOD PRESSURE: 142 MMHG

## 2022-10-13 DIAGNOSIS — E66.01 CLASS 3 SEVERE OBESITY DUE TO EXCESS CALORIES WITH SERIOUS COMORBIDITY IN ADULT, UNSPECIFIED BMI (HCC): ICD-10-CM

## 2022-10-13 PROCEDURE — 99213 OFFICE O/P EST LOW 20 MIN: CPT | Performed by: NURSE PRACTITIONER

## 2022-10-13 NOTE — ASSESSMENT & PLAN NOTE
Patient has been doing well with Contrave  Is on maintenance dose twice a day  Continue with weight watchers  Continue with 2 pills twice a day  Increase exercise activity  Increase fluid hydration

## 2022-10-13 NOTE — PROGRESS NOTES
Name: Maddy Melgar      : 1960      MRN: [id number]  Encounter Provider: FINESSE Pennington  Encounter Date: 10/13/2022   Encounter department: [address]     1  Class 3 severe obesity due to excess calories with serious comorbidity in adult, unspecified BMI ([facility] [address] )  Assessment & Plan:  Patient has been doing well with Contrave  Is on maintenance dose twice a day  Continue with weight watchers  Continue with 2 pills twice a day  Increase exercise activity  Increase fluid hydration  Orders:  -     Naltrexone-buPROPion HCl ER 8-90 MG TB12; Take 2 tablets by mouth 2 (two) times a day      Depression Screening and Follow-up Plan: Patient was screened for depression during today's encounter  They screened negative with a PHQ-2 score of 0  Subjective      Patient is here for follow-up on weight loss efforts  Has been taking Contrave  Is up to 2 pills twice a day  Has lost 10 lb  Denies any side effects from the medication    Review of Systems   Constitutional: Negative  HENT: Negative  Eyes: Negative  Respiratory: Negative  Cardiovascular: Negative  Negative for chest pain and palpitations  Gastrointestinal: Negative  Endocrine: Negative  Genitourinary: Negative  Musculoskeletal: Negative  Allergic/Immunologic: Negative  Neurological: Negative  Psychiatric/Behavioral: Negative  Negative for dysphoric mood  The patient is not nervous/anxious          Current Outpatient Medications on File Prior to Visit   Medication Sig   • calcium citrate (CALCITRATE) 950 MG tablet Take 1 tablet by mouth daily   • levothyroxine (Euthyrox) 50 mcg tablet Take 1 tablet (50 mcg total) by mouth daily in the early morning   • metoprolol succinate (TOPROL-XL) 25 mg 24 hr tablet Take 1 tablet (25 mg total) by mouth daily   • Multiple Vitamins-Minerals (MULTIVITAMIN WITH MINERALS) tablet Take 1 tablet by mouth daily   • Probiotic Product (PROBIOTIC DAILY PO) Take by mouth   • [DISCONTINUED] Naltrexone-buPROPion HCl ER 8-90 MG TB12 Week 1 take 1 tablet with meals, Week 2 take 1 tablet in the morning and 1 in the evening,Week  3 take 2 tablets in the morning and 1 tablet in the evening, Week  4 take 2 tablets in the morning and 2 tablets in the evening,       Objective     /86   Pulse 74   Temp 98 1 °F (36 7 °C)   Resp 18   Ht 5' 5" (1 651 m)   Wt 114 kg (252 lb 3 2 oz)   SpO2 98%   BMI 41 97 kg/m²     Physical Exam  Vitals and nursing note reviewed  Constitutional:       Appearance: She is well-developed  She is obese  HENT:      Head: Normocephalic and atraumatic  Eyes:      Pupils: Pupils are equal, round, and reactive to light  Cardiovascular:      Rate and Rhythm: Normal rate and regular rhythm  Pulses: Normal pulses  Heart sounds: Normal heart sounds  Pulmonary:      Effort: Pulmonary effort is normal       Breath sounds: Normal breath sounds  Musculoskeletal:         General: Normal range of motion  Cervical back: Normal range of motion  Skin:     General: Skin is warm and dry  Neurological:      Mental Status: She is alert and oriented to person, place, and time  Psychiatric:         Mood and Affect: Mood normal          Behavior: Behavior normal          Thought Content:  Thought content normal          Judgment: Judgment normal        FINESSE Mccord

## 2022-11-28 ENCOUNTER — TELEPHONE (OUTPATIENT)
Dept: FAMILY MEDICINE CLINIC | Facility: CLINIC | Age: 62
End: 2022-11-28

## 2022-11-28 DIAGNOSIS — H10.33 ACUTE BACTERIAL CONJUNCTIVITIS OF BOTH EYES: Primary | ICD-10-CM

## 2022-11-28 RX ORDER — POLYMYXIN B SULFATE AND TRIMETHOPRIM 1; 10000 MG/ML; [USP'U]/ML
1 SOLUTION OPHTHALMIC EVERY 4 HOURS
Qty: 10 ML | Refills: 0 | Status: SHIPPED | OUTPATIENT
Start: 2022-11-28 | End: 2023-03-07

## 2022-11-28 NOTE — TELEPHONE ENCOUNTER
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

## 2022-12-29 ENCOUNTER — CONSULT (OUTPATIENT)
Dept: BARIATRICS | Facility: CLINIC | Age: 62
End: 2022-12-29

## 2022-12-29 VITALS
RESPIRATION RATE: 16 BRPM | HEART RATE: 72 BPM | WEIGHT: 239.2 LBS | HEIGHT: 64 IN | SYSTOLIC BLOOD PRESSURE: 150 MMHG | DIASTOLIC BLOOD PRESSURE: 90 MMHG | BODY MASS INDEX: 40.84 KG/M2

## 2022-12-29 DIAGNOSIS — K91.2 POSTSURGICAL MALABSORPTION: Primary | ICD-10-CM

## 2022-12-29 DIAGNOSIS — K43.9 VENTRAL HERNIA WITHOUT OBSTRUCTION OR GANGRENE: ICD-10-CM

## 2022-12-29 DIAGNOSIS — E66.01 MORBID OBESITY (HCC): ICD-10-CM

## 2022-12-29 DIAGNOSIS — E03.9 HYPOTHYROIDISM: ICD-10-CM

## 2022-12-29 NOTE — PROGRESS NOTES
Assessment/Plan:  Cheri Fall was seen today for consult  Diagnoses and all orders for this visit:    Morbid obesity ([facility] [address] )  BMI 40 0-44 9, adult ([facility] [address] )  Cont Contrave advised patient to take it as prescribed not only once per day  Postsurgical malabsorption  -     Vitamin D 25 hydroxy; Future  -     Zinc; Future  -     Vitamin A; Future  -     Folate; Future  -     Vitamin B12; Future  -     CBC and differential; Future  -     Iron, TIBC and Ferritin Panel; Future  -     Comprehensive metabolic panel; Future  -     Copper Level; Future  Advised meet with surgery team for revisiting status  Also advised patient to add 2000mcg B12 and 3000 units vit D3 in her daily regimen, she is only taking MVI  Advised more water intake- her oral mucosa is very dry  Hypothyroidism  -     TSH w/Reflex; Future    Ventral hernia without obstruction or gangrene  No pain now, weight loss is necessary if wants to consider repair     Component Ref Range & Units 8/17/22  8:00 AM 7/18/18  7:55 AM 8/8/17  7:58 AM   Hemoglobin A1C Normal 3 8-5 6%; PreDiabetic 5 7-6 4%; Diabetic >=6 5%; Glycemic control for adults with diabetes <7 0% % 5 7 High         Component Ref Range & Units 6/8/22  7:39 AM 1/31/22  7:52 AM 11/3/21  7:54 AM 6/28/16  8:12 AM 10/5/15  7:41 AM   TSH 3RD GENERATON 0 450 - 4 500 uIU/mL 5 650 High   10 100 High  R, CM  8 250 High  R, CM  3 660 R  4 610          Obesity:   Weight not at goal and patient tried more than 6 months to lose weight and was not able to achieve a meaningful weight loss above 5%  - Discussed options of [facility] Lifestyle Intervention Program and Conservative Program and the role of weight loss medications  - Patient is interested in pursuing Conservative Program  - Initial weight loss goal of 5-10% weight loss for improved health  - Weight loss can improve patient's co-morbid conditions and/or prevent weight-related complications    Nutrition prescription:  • Calorie goal: 1400kcl  • Motivational interview performed and patient noted changes to work on until next visit  • Hydration: 64oz fluid, no sugary drinks  • Goal 3 meals per day  • Food log (ie ) www [email or web address],[place], lose it or preferred sunita  • Increase physical activity by 10 minutes daily  Return in 2mo    Subjective:   Chief Complaint   Patient presents with   • Consult     Initial visit with yvan       Patient ID: Maris Vargas  is a 58 y o  female with excess weight/obesity here to pursue weight management  Previous notes and records have been reviewed          HPI  Wt Readings from Last 20 Encounters:   12/29/22 109 kg (239 lb 3 2 oz)   10/13/22 114 kg (252 lb 3 2 oz)   08/18/22 119 kg (262 lb)   11/23/21 117 kg (257 lb 6 4 oz)   10/28/21 115 kg (254 lb 3 2 oz)   03/13/20 106 kg (234 lb 9 6 oz)   03/04/20 107 kg (236 lb)   01/31/20 105 kg (230 lb 11 2 oz)   10/31/19 107 kg (235 lb 8 oz)   10/22/19 109 kg (240 lb 4 8 oz)   06/13/19 98 9 kg (218 lb)   05/02/19 95 3 kg (210 lb)   05/01/19 98 4 kg (216 lb 14 9 oz)   02/06/19 95 3 kg (210 lb 3 2 oz)   01/24/19 98 1 kg (216 lb 4 3 oz)   05/24/18 105 kg (231 lb)   02/28/17 95 5 kg (210 lb 8 6 oz)   06/22/16 85 5 kg (188 lb 9 6 oz)   09/01/15 93 2 kg (205 lb 8 oz)   07/07/15 99 8 kg (220 lb)       Follows weight watchers  Started Contrave Aug 2015 lost some weight  PCP started med , GLP-1 not approved  She is taking Contrave only at night time  When at work:   [employer] and banana , takes 2 tangerines and a pear for snacks    L-salad tomato can of tuna 1 oz feta cheese 1 tbs olive oil and vinegar  S-fruit  D-chicken breast , fish rare carbs , salad or veggie  S-  When not at work CAREY Cruz on foods while at home  Hydration: 2 bottles a day  Alcohol: no  Smoking:no  Exercise: not much  Occupation: works as a nurse desk job  STOP bang:3/8    Past Medical History:   Diagnosis Date   • Disease of thyroid gland    • Hypertension    • Obesity    • Postgastrectomy malabsorption      Past Surgical History:   Procedure Laterality Date   •  SECTION     • GASTRIC BYPASS     • PA LAPS ABD PRTM&OMENTUM DX W/WO SPEC BR/WA SPX N/A 2019    Procedure: LAPAROSCOPY DIAGNOSTIC CONVERTED TO OPEN; SMALL BOWEL RESECTION; LYSIS OF ADHESIONS;  Surgeon: Candace Mak MD;  Location: [facility];  Service: General     The following portions of the patient's history were reviewed and updated as appropriate: allergies, current medications, past family history, past medical history, past social history, past surgical history, and problem list     ROS:  Review of Systems   Constitutional: Negative for activity change  Fatigue  HENT: Negative for trouble swallowing  Respiratory: Negative for shortness of breath  Cardiovascular: Negative for chest pain, edema  Gastrointestinal: Negative for abdominal pain, nausea and vomiting, acid reflux, constipation/diarrhea  Endocrine: negative for heat /cold intolerance  Genitourinary: Negative for difficulty urinating  Musculoskeletal: Negative for gait problem and myalgias  Psychiatric/Behavioral: Negative for behavioral problems including anxiety /depression  Objective:  /90 (BP Location: Left arm, Patient Position: Sitting, Cuff Size: Large)   Pulse 72   Resp 16   Ht 5' 4" (1 626 m)   Wt 109 kg (239 lb 3 2 oz)   BMI 41 06 kg/m²   Constitutional: Well-developed, well-nourished and Obese Body mass index is 41 06 kg/m²  Thomasina Cooks Alert, cooperative  HEENT: No conjunctival injection  No thyroid masses, dry oral mucosa  Pulmonary: No increased work of breathing or signs of respiratory distress  Clear respiratory sounds  CV: Well-perfused, Regular rate and rhythm, no murmurs  Vascular: no peripheral edema  GI: Abdomen obese, Non-distended  Not tender, abdominal hernia under umbilicus present , reducible  MSK: no sarcopenia noted   Neuro: Oriented to person, place and time  Normal Speech  Normal gait  Psych: Normal affect and mood   Normal thought process, no delusions     Labs and Imaging  Recent labs and imaging have been personally reviewed    Lab Results   Component Value Date    WBC 10 71 (H) 11/03/2021    HGB 13 5 11/03/2021    HCT 42 8 11/03/2021    MCV 92 11/03/2021     11/03/2021     Lab Results   Component Value Date     10/05/2015    SODIUM 138 11/03/2021    K 4 7 11/03/2021     11/03/2021    CO2 30 11/03/2021    ANIONGAP 9 10/05/2015    AGAP 1 (L) 11/03/2021    BUN 19 11/03/2021    CREATININE 0 76 11/03/2021    GLUC 83 10/28/2019    GLUF 86 11/03/2021    CALCIUM 9 7 11/03/2021    AST 14 11/03/2021    ALT 25 11/03/2021    ALKPHOS 90 11/03/2021    PROT 7 4 10/05/2015    TP 7 5 11/03/2021    BILITOT 0 34 10/05/2015    TBILI 0 30 11/03/2021    EGFR 85 11/03/2021     Lab Results   Component Value Date    HGBA1C 5 7 (H) 08/17/2022     Lab Results   Component Value Date    BMK6ARTTISFF 5 650 (H) 06/08/2022     Lab Results   Component Value Date    CHOLESTEROL 219 (H) 08/17/2022     Lab Results   Component Value Date    HDL 44 (L) 08/17/2022     Lab Results   Component Value Date    TRIG 198 (H) 08/17/2022     Lab Results   Component Value Date    LDLCALC 135 (H) 08/17/2022

## 2023-01-03 ENCOUNTER — APPOINTMENT (OUTPATIENT)
Dept: LAB | Facility: CLINIC | Age: 63
End: 2023-01-03

## 2023-01-03 DIAGNOSIS — E03.9 HYPOTHYROIDISM: ICD-10-CM

## 2023-01-03 DIAGNOSIS — K91.2 POSTSURGICAL MALABSORPTION: ICD-10-CM

## 2023-01-03 DIAGNOSIS — E03.9 HYPOTHYROIDISM, UNSPECIFIED TYPE: ICD-10-CM

## 2023-01-03 LAB
25(OH)D3 SERPL-MCNC: 20.6 NG/ML (ref 30–100)
FERRITIN SERPL-MCNC: 108 NG/ML (ref 8–388)
FOLATE SERPL-MCNC: >20 NG/ML (ref 3.1–17.5)
IRON SATN MFR SERPL: 25 % (ref 15–50)
IRON SERPL-MCNC: 72 UG/DL (ref 50–170)
TIBC SERPL-MCNC: 288 UG/DL (ref 250–450)
TSH SERPL DL<=0.05 MIU/L-ACNC: 5.43 UIU/ML (ref 0.45–4.5)
VIT B12 SERPL-MCNC: 2375 PG/ML (ref 100–900)

## 2023-01-04 ENCOUNTER — TELEPHONE (OUTPATIENT)
Dept: BARIATRICS | Facility: CLINIC | Age: 63
End: 2023-01-04

## 2023-01-04 ENCOUNTER — APPOINTMENT (OUTPATIENT)
Dept: LAB | Facility: CLINIC | Age: 63
End: 2023-01-04

## 2023-01-04 DIAGNOSIS — K91.2 POSTRESECTIONAL MALABSORPTION SYNDROME: ICD-10-CM

## 2023-01-04 LAB
ALBUMIN SERPL BCP-MCNC: 3.7 G/DL (ref 3.5–5)
ALP SERPL-CCNC: 93 U/L (ref 46–116)
ALT SERPL W P-5'-P-CCNC: 26 U/L (ref 12–78)
ANION GAP SERPL CALCULATED.3IONS-SCNC: 8 MMOL/L (ref 4–13)
AST SERPL W P-5'-P-CCNC: 18 U/L (ref 5–45)
BASOPHILS # BLD AUTO: 0.05 THOUSANDS/ÂΜL (ref 0–0.1)
BASOPHILS NFR BLD AUTO: 1 % (ref 0–1)
BILIRUB SERPL-MCNC: 0.34 MG/DL (ref 0.2–1)
BUN SERPL-MCNC: 17 MG/DL (ref 5–25)
CALCIUM SERPL-MCNC: 9.6 MG/DL (ref 8.3–10.1)
CHLORIDE SERPL-SCNC: 107 MMOL/L (ref 96–108)
CO2 SERPL-SCNC: 27 MMOL/L (ref 21–32)
CREAT SERPL-MCNC: 0.86 MG/DL (ref 0.6–1.3)
EOSINOPHIL # BLD AUTO: 0.08 THOUSAND/ÂΜL (ref 0–0.61)
EOSINOPHIL NFR BLD AUTO: 1 % (ref 0–6)
ERYTHROCYTE [DISTWIDTH] IN BLOOD BY AUTOMATED COUNT: 13.4 % (ref 11.6–15.1)
GFR SERPL CREATININE-BSD FRML MDRD: 72 ML/MIN/1.73SQ M
GLUCOSE SERPL-MCNC: 100 MG/DL (ref 65–140)
HCT VFR BLD AUTO: 43.7 % (ref 34.8–46.1)
HGB BLD-MCNC: 13.5 G/DL (ref 11.5–15.4)
IMM GRANULOCYTES # BLD AUTO: 0.03 THOUSAND/UL (ref 0–0.2)
IMM GRANULOCYTES NFR BLD AUTO: 0 % (ref 0–2)
LYMPHOCYTES # BLD AUTO: 1.69 THOUSANDS/ÂΜL (ref 0.6–4.47)
LYMPHOCYTES NFR BLD AUTO: 18 % (ref 14–44)
MCH RBC QN AUTO: 28.6 PG (ref 26.8–34.3)
MCHC RBC AUTO-ENTMCNC: 30.9 G/DL (ref 31.4–37.4)
MCV RBC AUTO: 93 FL (ref 82–98)
MONOCYTES # BLD AUTO: 0.45 THOUSAND/ÂΜL (ref 0.17–1.22)
MONOCYTES NFR BLD AUTO: 5 % (ref 4–12)
NEUTROPHILS # BLD AUTO: 7.27 THOUSANDS/ÂΜL (ref 1.85–7.62)
NEUTS SEG NFR BLD AUTO: 75 % (ref 43–75)
NRBC BLD AUTO-RTO: 0 /100 WBCS
PLATELET # BLD AUTO: 276 THOUSANDS/UL (ref 149–390)
PMV BLD AUTO: 11.3 FL (ref 8.9–12.7)
POTASSIUM SERPL-SCNC: 4.9 MMOL/L (ref 3.5–5.3)
PROT SERPL-MCNC: 7.3 G/DL (ref 6.4–8.4)
RBC # BLD AUTO: 4.72 MILLION/UL (ref 3.81–5.12)
SODIUM SERPL-SCNC: 142 MMOL/L (ref 135–147)
T4 FREE SERPL-MCNC: 1.03 NG/DL (ref 0.76–1.46)
WBC # BLD AUTO: 9.57 THOUSAND/UL (ref 4.31–10.16)

## 2023-01-06 LAB — COPPER SERPL-MCNC: 139 UG/DL (ref 80–158)

## 2023-01-07 LAB
VIT A SERPL-MCNC: 42.2 UG/DL (ref 22–69.5)
ZINC SERPL-MCNC: 82 UG/DL (ref 44–115)

## 2023-01-10 DIAGNOSIS — E03.9 HYPOTHYROIDISM, UNSPECIFIED TYPE: ICD-10-CM

## 2023-01-10 DIAGNOSIS — I10 PRIMARY HYPERTENSION: ICD-10-CM

## 2023-01-10 DIAGNOSIS — E66.01 CLASS 3 SEVERE OBESITY DUE TO EXCESS CALORIES WITH SERIOUS COMORBIDITY IN ADULT, UNSPECIFIED BMI (HCC): ICD-10-CM

## 2023-01-11 RX ORDER — METOPROLOL SUCCINATE 25 MG/1
25 TABLET, EXTENDED RELEASE ORAL DAILY
Qty: 90 TABLET | Refills: 0 | Status: SHIPPED | OUTPATIENT
Start: 2023-01-11

## 2023-01-11 RX ORDER — LEVOTHYROXINE SODIUM 0.05 MG/1
50 TABLET ORAL
Qty: 90 TABLET | Refills: 0 | Status: SHIPPED | OUTPATIENT
Start: 2023-01-11

## 2023-03-07 ENCOUNTER — OFFICE VISIT (OUTPATIENT)
Dept: FAMILY MEDICINE CLINIC | Facility: CLINIC | Age: 63
End: 2023-03-07

## 2023-03-07 VITALS
DIASTOLIC BLOOD PRESSURE: 80 MMHG | TEMPERATURE: 97.5 F | BODY MASS INDEX: 38.99 KG/M2 | WEIGHT: 234 LBS | HEART RATE: 79 BPM | SYSTOLIC BLOOD PRESSURE: 140 MMHG | OXYGEN SATURATION: 98 % | HEIGHT: 65 IN | RESPIRATION RATE: 16 BRPM

## 2023-03-07 DIAGNOSIS — D58.2 ELEVATED HEMOGLOBIN (HCC): ICD-10-CM

## 2023-03-07 DIAGNOSIS — E53.8 VITAMIN B12 DEFICIENCY: ICD-10-CM

## 2023-03-07 DIAGNOSIS — Z00.00 ANNUAL PHYSICAL EXAM: Primary | ICD-10-CM

## 2023-03-07 DIAGNOSIS — E55.9 HYPOVITAMINOSIS D: ICD-10-CM

## 2023-03-07 DIAGNOSIS — R53.83 OTHER FATIGUE: ICD-10-CM

## 2023-03-07 DIAGNOSIS — E50.9 HYPOVITAMINOSIS A: ICD-10-CM

## 2023-03-07 DIAGNOSIS — E03.9 HYPOTHYROIDISM, UNSPECIFIED TYPE: ICD-10-CM

## 2023-03-07 DIAGNOSIS — Z12.31 ENCOUNTER FOR SCREENING MAMMOGRAM FOR BREAST CANCER: ICD-10-CM

## 2023-03-07 DIAGNOSIS — I10 PRIMARY HYPERTENSION: ICD-10-CM

## 2023-03-07 DIAGNOSIS — E53.8 FOLATE DEFICIENCY: ICD-10-CM

## 2023-03-07 DIAGNOSIS — E66.01 CLASS 3 SEVERE OBESITY DUE TO EXCESS CALORIES WITH SERIOUS COMORBIDITY IN ADULT, UNSPECIFIED BMI (HCC): ICD-10-CM

## 2023-03-07 RX ORDER — VITAMIN B COMPLEX
TABLET ORAL
COMMUNITY
Start: 2022-12-29

## 2023-03-07 RX ORDER — LEVOTHYROXINE SODIUM 0.05 MG/1
50 TABLET ORAL
Qty: 90 TABLET | Refills: 0 | Status: SHIPPED | OUTPATIENT
Start: 2023-03-07

## 2023-03-07 RX ORDER — LANOLIN ALCOHOL/MO/W.PET/CERES
CREAM (GRAM) TOPICAL
COMMUNITY
Start: 2022-12-29

## 2023-03-07 RX ORDER — METOPROLOL SUCCINATE 25 MG/1
25 TABLET, EXTENDED RELEASE ORAL DAILY
Qty: 90 TABLET | Refills: 0 | Status: SHIPPED | OUTPATIENT
Start: 2023-03-07

## 2023-03-07 NOTE — PROGRESS NOTES
ADULT ANNUAL [facility] PRIMARY CARE    NAME: Nancy Head  AGE: 61 y o  SEX: female  : 1960     DATE: 3/7/2023     Assessment and Plan:     Problem List Items Addressed This Visit        Endocrine    Hypothyroidism    Relevant Orders    TSH, 3rd generation with Free T4 reflex       Other    Annual physical exam - Primary     Patient is here for annual physical   Up-to-date with cervical cancer screening  Up-to-date with colon cancer screening  Mammogram ordered  Blood work ordered  Discussed heart healthy diet  Continue with diet and exercise  Doing well on Contrave  Also has been following weight watchers diet  Relevant Orders    Lipid panel    Comprehensive metabolic panel    Class 3 severe obesity due to excess calories in adult [facility])    Relevant Medications    Naltrexone-buPROPion HCl ER 8-90 MG TB12   Other Visit Diagnoses     Encounter for screening mammogram for breast cancer        Relevant Orders    Mammo screening bilateral w 3d & cad    Hypovitaminosis A        Hypovitaminosis D        Relevant Orders    Vitamin D 25 hydroxy    Other fatigue        Folate deficiency        Relevant Orders    Folate    Vitamin B12 deficiency        Relevant Orders    Vitamin B12    Elevated hemoglobin (HCC)        Relevant Orders    Hemoglobin A1C          Immunizations and preventive care screenings were discussed with patient today  Appropriate education was printed on patient's after visit summary  Counseling:  Alcohol/drug use: discussed moderation in alcohol intake, the recommendations for healthy alcohol use, and avoidance of illicit drug use  Dental Health: discussed importance of regular tooth brushing, flossing, and dental visits  Injury prevention: discussed safety/seat belts, safety helmets, smoke detectors, carbon dioxide detectors, and smoking near bedding or upholstery    Sexual health: discussed sexually transmitted diseases, partner selection, use of condoms, avoidance of unintended pregnancy, and contraceptive alternatives  Exercise: the importance of regular exercise/physical activity was discussed  Recommend exercise 3-5 times per week for at least 30 minutes  BMI Counseling: Body mass index is 38 94 kg/m²  The BMI is above normal  Nutrition recommendations include decreasing portion sizes, encouraging healthy choices of fruits and vegetables, decreasing fast food intake, consuming healthier snacks, limiting drinks that contain sugar, moderation in carbohydrate intake, increasing intake of lean protein, reducing intake of saturated and trans fat and reducing intake of cholesterol  Exercise recommendations include exercising 3-5 times per week and strength training exercises  No pharmacotherapy was ordered  Rationale for BMI follow-up plan is due to patient being overweight or obese  Return in about 6 months (around 9/7/2023)  Chief Complaint:     Chief Complaint   Patient presents with   • Follow-up   • Physical Exam   • Medication Refill      History of Present Illness:     Adult Annual Physical   Patient here for a comprehensive physical exam  The patient reports no problems  Diet and Physical Activity  Diet/Nutrition: well balanced diet  Exercise: no formal exercise  Depression Screening  PHQ-2/9 Depression Screening    Little interest or pleasure in doing things: 0 - not at all  Feeling down, depressed, or hopeless: 0 - not at all       General Health  Sleep: sleeps well  Hearing: normal - bilateral   Vision: most recent eye exam <1 year ago and wears glasses  Dental: regular dental visits and brushes teeth twice daily  /GYN Health  Patient is: postmenopausal  Last menstrual period: stopped 60  Contraceptive method: none needed  Review of Systems:     Review of Systems   Constitutional: Negative  HENT: Negative  Eyes: Negative  Respiratory: Negative      Cardiovascular: Negative  Gastrointestinal: Negative  Endocrine: Negative  Genitourinary: Negative  Musculoskeletal: Negative  Skin: Negative  Allergic/Immunologic: Negative  Neurological: Negative  Psychiatric/Behavioral: Negative         Past Medical History:     Past Medical History:   Diagnosis Date   • Disease of thyroid gland    • Hypertension    • Obesity    • Postgastrectomy malabsorption       Past Surgical History:     Past Surgical History:   Procedure Laterality Date   •  SECTION     • GASTRIC BYPASS     • GA LAPS ABD PRTM&OMENTUM DX W/WO SPEC BR/WA SPX N/A 2019    Procedure: LAPAROSCOPY DIAGNOSTIC CONVERTED TO OPEN; SMALL BOWEL RESECTION; LYSIS OF ADHESIONS;  Surgeon: Dimitri Quiñonez MD;  Location: [facility];  Service: General      Social History:     Social History     Socioeconomic History   • Marital status: /Civil Union     Spouse name: None   • Number of children: None   • Years of education: None   • Highest education level: None   Occupational History   • None   Tobacco Use   • Smoking status: Former     Types: Cigarettes     Quit date:      Years since quittin 2   • Smokeless tobacco: Never   Vaping Use   • Vaping Use: Never used   Substance and Sexual Activity   • Alcohol use: Not Currently     Comment: rarely socially   • Drug use: Yes     Comment: socially   • Sexual activity: None   Other Topics Concern   • None   Social History Narrative   • None     Social Determinants of Health     Financial Resource Strain: Not on file   Food Insecurity: Not on file   Transportation Needs: Not on file   Physical Activity: Not on file   Stress: Not on file   Social Connections: Not on file   Intimate Partner Violence: Not on file   Housing Stability: Not on file      Family History:     Family History   Problem Relation Age of Onset   • Hypertension Mother    • COPD Father       Current Medications:     Current Outpatient Medications   Medication Sig Dispense Refill • calcium citrate (CALCITRATE) 950 MG tablet Take 1 tablet by mouth daily     • cholecalciferol (VITAMIN D3) 25 mcg (1,000 units) tablet      • Multiple Vitamins-Minerals (MULTIVITAMIN WITH MINERALS) tablet Take 1 tablet by mouth daily     • Naltrexone-buPROPion HCl ER 8-90 MG TB12 Take 2 tablets by mouth 2 (two) times a day 120 tablet 3   • Probiotic Product (PROBIOTIC DAILY PO) Take by mouth     • vitamin B-12 (VITAMIN B-12) 1,000 mcg tablet      • levothyroxine (Euthyrox) 50 mcg tablet Take 1 tablet (50 mcg total) by mouth daily in the early morning 90 tablet 0   • metoprolol succinate (TOPROL-XL) 25 mg 24 hr tablet Take 1 tablet (25 mg total) by mouth daily 90 tablet 0     No current facility-administered medications for this visit  Allergies:     No Known Allergies   Physical Exam:     /80   Pulse 79   Temp 97 5 °F (36 4 °C) (Temporal)   Resp 16   Ht 5' 5" (1 651 m)   Wt 106 kg (234 lb)   SpO2 98%   BMI 38 94 kg/m²     Physical Exam  Constitutional:       General: She is not in acute distress  Appearance: Normal appearance  She is obese  She is not ill-appearing  HENT:      Head: Normocephalic and atraumatic  Nose: Nose normal       Mouth/Throat:      Mouth: Mucous membranes are moist    Eyes:      Pupils: Pupils are equal, round, and reactive to light  Cardiovascular:      Rate and Rhythm: Normal rate and regular rhythm  Pulses: Normal pulses  Heart sounds: Normal heart sounds  Pulmonary:      Effort: Pulmonary effort is normal  No respiratory distress  Breath sounds: Normal breath sounds  Chest:      Chest wall: No tenderness  Abdominal:      General: Abdomen is flat  Bowel sounds are normal  There is no distension  Palpations: There is no mass  Tenderness: There is no abdominal tenderness  Musculoskeletal:         General: Normal range of motion  Cervical back: Normal range of motion and neck supple     Skin:     General: Skin is warm and dry    Neurological:      General: No focal deficit present  Mental Status: She is alert and oriented to person, place, and time  Psychiatric:         Mood and Affect: Mood normal          Behavior: Behavior normal          Thought Content:  Thought content normal          Judgment: Judgment normal           FINESSE Martínez  [address]

## 2023-03-07 NOTE — ASSESSMENT & PLAN NOTE
Patient is here for annual physical   Up-to-date with cervical cancer screening  Up-to-date with colon cancer screening  Mammogram ordered  Blood work ordered  Discussed heart healthy diet  Continue with diet and exercise  Doing well on Contrave  Also has been following weight watchers diet

## 2023-03-08 ENCOUNTER — LAB (OUTPATIENT)
Dept: LAB | Facility: CLINIC | Age: 63
End: 2023-03-08

## 2023-03-08 DIAGNOSIS — E53.8 FOLATE DEFICIENCY: ICD-10-CM

## 2023-03-08 DIAGNOSIS — E03.9 HYPOTHYROIDISM, UNSPECIFIED TYPE: ICD-10-CM

## 2023-03-08 DIAGNOSIS — D58.2 ELEVATED HEMOGLOBIN (HCC): ICD-10-CM

## 2023-03-08 DIAGNOSIS — Z00.00 ANNUAL PHYSICAL EXAM: ICD-10-CM

## 2023-03-08 DIAGNOSIS — E55.9 HYPOVITAMINOSIS D: ICD-10-CM

## 2023-03-08 DIAGNOSIS — E53.8 VITAMIN B12 DEFICIENCY: ICD-10-CM

## 2023-03-08 LAB
25(OH)D3 SERPL-MCNC: 37.9 NG/ML (ref 30–100)
ALBUMIN SERPL BCP-MCNC: 3.6 G/DL (ref 3.5–5)
ALP SERPL-CCNC: 88 U/L (ref 46–116)
ALT SERPL W P-5'-P-CCNC: 29 U/L (ref 12–78)
ANION GAP SERPL CALCULATED.3IONS-SCNC: 2 MMOL/L (ref 4–13)
AST SERPL W P-5'-P-CCNC: 23 U/L (ref 5–45)
BILIRUB SERPL-MCNC: 0.32 MG/DL (ref 0.2–1)
BUN SERPL-MCNC: 17 MG/DL (ref 5–25)
CALCIUM SERPL-MCNC: 9.9 MG/DL (ref 8.3–10.1)
CHLORIDE SERPL-SCNC: 107 MMOL/L (ref 96–108)
CHOLEST SERPL-MCNC: 205 MG/DL
CO2 SERPL-SCNC: 29 MMOL/L (ref 21–32)
CREAT SERPL-MCNC: 0.77 MG/DL (ref 0.6–1.3)
EST. AVERAGE GLUCOSE BLD GHB EST-MCNC: 105 MG/DL
FOLATE SERPL-MCNC: >20 NG/ML (ref 3.1–17.5)
GFR SERPL CREATININE-BSD FRML MDRD: 82 ML/MIN/1.73SQ M
GLUCOSE P FAST SERPL-MCNC: 85 MG/DL (ref 65–99)
HBA1C MFR BLD: 5.3 %
HDLC SERPL-MCNC: 44 MG/DL
LDLC SERPL CALC-MCNC: 125 MG/DL (ref 0–100)
NONHDLC SERPL-MCNC: 161 MG/DL
POTASSIUM SERPL-SCNC: 4.8 MMOL/L (ref 3.5–5.3)
PROT SERPL-MCNC: 7.6 G/DL (ref 6.4–8.4)
SODIUM SERPL-SCNC: 138 MMOL/L (ref 135–147)
T4 FREE SERPL-MCNC: 1 NG/DL (ref 0.76–1.46)
TRIGL SERPL-MCNC: 181 MG/DL
TSH SERPL DL<=0.05 MIU/L-ACNC: 4.74 UIU/ML (ref 0.45–4.5)
VIT B12 SERPL-MCNC: 3056 PG/ML (ref 100–900)

## 2023-03-16 ENCOUNTER — HOSPITAL ENCOUNTER (OUTPATIENT)
Dept: MAMMOGRAPHY | Facility: CLINIC | Age: 63
Discharge: HOME | End: 2023-03-16

## 2023-03-16 VITALS — HEIGHT: 65 IN | BODY MASS INDEX: 38.15 KG/M2 | WEIGHT: 229 LBS

## 2023-03-16 DIAGNOSIS — Z12.31 ENCOUNTER FOR SCREENING MAMMOGRAM FOR BREAST CANCER: ICD-10-CM

## 2023-03-28 ENCOUNTER — DOCUMENTATION (OUTPATIENT)
Dept: FAMILY MEDICINE CLINIC | Facility: CLINIC | Age: 63
End: 2023-03-28

## 2023-04-04 ENCOUNTER — TELEPHONE (OUTPATIENT)
Dept: FAMILY MEDICINE CLINIC | Facility: CLINIC | Age: 63
End: 2023-04-04

## 2023-04-04 NOTE — TELEPHONE ENCOUNTER
Contrave has been denied by patient's insurance  Insurance states that Alissa Begum is no longer on the formulary and will have to try and fail one of the following: Timmothy Lev or Phentermine    Whe I spoke with patient this morning, she stated that she is willing to try the Phentermine which will still require a Prior Auth

## 2023-04-05 ENCOUNTER — DOCUMENTATION (OUTPATIENT)
Dept: FAMILY MEDICINE CLINIC | Facility: CLINIC | Age: 63
End: 2023-04-05

## 2023-04-05 DIAGNOSIS — E66.01 CLASS 2 SEVERE OBESITY DUE TO EXCESS CALORIES WITH SERIOUS COMORBIDITY AND BODY MASS INDEX (BMI) OF 38.0 TO 38.9 IN ADULT: Primary | ICD-10-CM

## 2023-04-28 ENCOUNTER — OFFICE VISIT (OUTPATIENT)
Dept: FAMILY MEDICINE CLINIC | Facility: CLINIC | Age: 63
End: 2023-04-28

## 2023-04-28 VITALS
HEART RATE: 77 BPM | WEIGHT: 229 LBS | DIASTOLIC BLOOD PRESSURE: 86 MMHG | RESPIRATION RATE: 14 BRPM | OXYGEN SATURATION: 97 % | BODY MASS INDEX: 38.15 KG/M2 | HEIGHT: 65 IN | TEMPERATURE: 97.2 F | SYSTOLIC BLOOD PRESSURE: 140 MMHG

## 2023-04-28 DIAGNOSIS — E66.01 CLASS 2 SEVERE OBESITY DUE TO EXCESS CALORIES WITH SERIOUS COMORBIDITY AND BODY MASS INDEX (BMI) OF 38.0 TO 38.9 IN ADULT (HCC): Primary | ICD-10-CM

## 2023-04-28 DIAGNOSIS — B49 FUNGAL INFECTION: ICD-10-CM

## 2023-04-28 PROBLEM — E66.812 CLASS 2 SEVERE OBESITY DUE TO EXCESS CALORIES WITH SERIOUS COMORBIDITY AND BODY MASS INDEX (BMI) OF 38.0 TO 38.9 IN ADULT (HCC): Status: ACTIVE | Noted: 2022-08-19

## 2023-04-28 RX ORDER — KETOCONAZOLE 20 MG/G
CREAM TOPICAL DAILY
Qty: 15 G | Refills: 0 | Status: SHIPPED | OUTPATIENT
Start: 2023-04-28

## 2023-04-28 NOTE — PROGRESS NOTES
Name: Christian Whaley      : 1960      MRN: [id number]  Encounter Provider: Erskine Duane, CRNP  Encounter Date: 2023   Encounter department: [address] [place] [address]  Class 2 severe obesity due to excess calories with serious comorbidity and body mass index (BMI) of 38 0 to 38 9 in adult [facility])  Assessment & Plan:  Patient has been taking Wegovy  Did have nausea for the first 2 days symptoms have improved  Has been doing well on the medication  Increase dosage to 0 5 mg   Discussed side effects  Discussed management of nausea, diarrhea  Continue with low calorie diet  Continue with fluid hydration  At least 8 glasses a day  Patient is aware that she needs to make monthly weight check appointments until highest dose then will do refills  Orders:  -     Semaglutide-Weight Management (WEGOVY) 0 5 MG/0 5ML; Inject 0 5 mL (0 5 mg total) under the skin once a week for 4 doses    2  Fungal infection  Assessment & Plan:  Has fungal rash upper abdomen  Is to look for source of irritation  Ketoconazole cream ordered  Monitor for signs of infection    Orders:  -     ketoconazole (NIZORAL) 2 % cream; Apply topically daily        Depression Screening and Follow-up Plan: Patient was screened for depression during today's encounter  They screened negative with a PHQ-2 score of 0  Subjective      And is here for follow-up on weight loss efforts  Has been taking Wegovy  Did have nausea the first 2 days  Symptoms have resolved  Has been doing well on the medication  Also has been following weight watchers    Review of Systems   Constitutional: Negative  HENT: Negative  Eyes: Negative  Respiratory: Negative  Cardiovascular: Negative  Gastrointestinal: Negative  Endocrine: Negative  Genitourinary: Negative  Musculoskeletal: Negative  Skin: Positive for rash (Abdomen)  Allergic/Immunologic: Negative  Neurological: Negative  "  Psychiatric/Behavioral: Negative  Current Outpatient Medications on File Prior to Visit   Medication Sig   • calcium citrate (CALCITRATE) 950 MG tablet Take 1 tablet by mouth daily   • cholecalciferol (VITAMIN D3) 25 mcg (1,000 units) tablet    • levothyroxine (Euthyrox) 50 mcg tablet Take 1 tablet (50 mcg total) by mouth daily in the early morning   • metoprolol succinate (TOPROL-XL) 25 mg 24 hr tablet Take 1 tablet (25 mg total) by mouth daily   • Multiple Vitamins-Minerals (MULTIVITAMIN WITH MINERALS) tablet Take 1 tablet by mouth daily   • Probiotic Product (PROBIOTIC DAILY PO) Take by mouth   • vitamin B-12 (VITAMIN B-12) 1,000 mcg tablet    • [DISCONTINUED] Naltrexone-buPROPion HCl ER 8-90 MG TB12 Take 2 tablets by mouth 2 (two) times a day   • [] Semaglutide-Weight Management (WEGOVY) 0 25 MG/0 5ML Inject 0 5 mL (0 25 mg total) under the skin once a week for 4 doses       Objective     /86   Pulse 77   Temp (!) 97 2 °F (36 2 °C) (Temporal)   Resp 14   Ht 5' 5\" (1 651 m)   Wt 104 kg (229 lb)   SpO2 97%   BMI 38 11 kg/m²     Physical Exam  Vitals and nursing note reviewed  Constitutional:       Appearance: She is well-developed  She is obese  HENT:      Head: Normocephalic and atraumatic  Cardiovascular:      Rate and Rhythm: Normal rate and regular rhythm  Pulses: Normal pulses  Heart sounds: Normal heart sounds  Pulmonary:      Effort: Pulmonary effort is normal       Breath sounds: Normal breath sounds  Musculoskeletal:         General: Normal range of motion  Cervical back: Normal range of motion  Skin:     General: Skin is warm and dry  Findings: Rash (Mid upper quadrant, discoloration) present  Neurological:      Mental Status: She is alert and oriented to person, place, and time  Psychiatric:         Mood and Affect: Mood normal          Behavior: Behavior normal          Thought Content:  Thought content normal          Judgment: Judgment " malena Santana, CRNP

## 2023-04-28 NOTE — ASSESSMENT & PLAN NOTE
Has fungal rash upper abdomen  Is to look for source of irritation  Ketoconazole cream ordered    Monitor for signs of infection

## 2023-04-28 NOTE — ASSESSMENT & PLAN NOTE
Patient has been taking Wegovy  Did have nausea for the first 2 days symptoms have improved  Has been doing well on the medication  Increase dosage to 0 5 mg   Discussed side effects  Discussed management of nausea, diarrhea  Continue with low calorie diet  Continue with fluid hydration  At least 8 glasses a day  Patient is aware that she needs to make monthly weight check appointments until highest dose then will do refills

## 2023-05-19 ENCOUNTER — OFFICE VISIT (OUTPATIENT)
Dept: FAMILY MEDICINE CLINIC | Facility: CLINIC | Age: 63
End: 2023-05-19

## 2023-05-19 VITALS
OXYGEN SATURATION: 96 % | BODY MASS INDEX: 37.49 KG/M2 | WEIGHT: 225 LBS | HEIGHT: 65 IN | DIASTOLIC BLOOD PRESSURE: 70 MMHG | RESPIRATION RATE: 16 BRPM | SYSTOLIC BLOOD PRESSURE: 140 MMHG | HEART RATE: 85 BPM | TEMPERATURE: 97.5 F

## 2023-05-19 DIAGNOSIS — E66.01 CLASS 2 SEVERE OBESITY DUE TO EXCESS CALORIES WITH SERIOUS COMORBIDITY AND BODY MASS INDEX (BMI) OF 38.0 TO 38.9 IN ADULT (HCC): Primary | ICD-10-CM

## 2023-05-19 NOTE — ASSESSMENT & PLAN NOTE
Patient has been doing well on Wegovy  Will increase dosage  Continue calorie diet, increase water hydration, increase exercise activity

## 2023-05-19 NOTE — PROGRESS NOTES
Name: Renee Rivera      : 1960      MRN: [id number]  Encounter Provider: FINESSE Willis  Encounter Date: 2023   Encounter department: [facility]     1  Class 2 severe obesity due to excess calories with serious comorbidity and body mass index (BMI) of 38 0 to 38 9 in adult [facility])  Assessment & Plan:  Patient has been doing well on Wegovy  Will increase dosage  Continue calorie diet, increase water hydration, increase exercise activity  Orders:  -     Semaglutide-Weight Management (Lupillo Reyes) 1 MG/0 5ML; Inject 0 5 mL (1 mg total) under the skin once a week        Depression Screening and Follow-up Plan: Patient was screened for depression during today's encounter  They screened negative with a PHQ-2 score of 0  Subjective      Patient is here for follow-up on weight loss efforts  Has been taking Wegovy, no side effects reported  Was having good results with Contrave but insurance denied  Patient also continues with weight watchers    Review of Systems   Constitutional: Negative  HENT: Negative  Eyes: Negative  Respiratory: Negative  Cardiovascular: Negative  Gastrointestinal: Negative  Endocrine: Negative  Genitourinary: Negative  Musculoskeletal: Negative  Allergic/Immunologic: Negative  Neurological: Negative  Psychiatric/Behavioral: Negative          Current Outpatient Medications on File Prior to Visit   Medication Sig   • calcium citrate (CALCITRATE) 950 MG tablet Take 1 tablet by mouth daily   • cholecalciferol (VITAMIN D3) 25 mcg (1,000 units) tablet    • ketoconazole (NIZORAL) 2 % cream Apply topically daily   • levothyroxine (Euthyrox) 50 mcg tablet Take 1 tablet (50 mcg total) by mouth daily in the early morning   • metoprolol succinate (TOPROL-XL) 25 mg 24 hr tablet Take 1 tablet (25 mg total) by mouth daily   • Multiple Vitamins-Minerals (MULTIVITAMIN WITH MINERALS) tablet Take 1 tablet by mouth "daily   • Probiotic Product (PROBIOTIC DAILY PO) Take by mouth   • vitamin B-12 (VITAMIN B-12) 1,000 mcg tablet    • [DISCONTINUED] Semaglutide-Weight Management (WEGOVY) 0 5 MG/0 5ML Inject 0 5 mL (0 5 mg total) under the skin once a week for 4 doses       Objective     /70   Pulse 85   Temp 97 5 °F (36 4 °C) (Temporal)   Resp 16   Ht 5' 5\" (1 651 m)   Wt 102 kg (225 lb)   SpO2 96%   BMI 37 44 kg/m²     Physical Exam  Constitutional:       Appearance: She is obese  Cardiovascular:      Rate and Rhythm: Normal rate and regular rhythm  Pulses: Normal pulses  Heart sounds: Normal heart sounds  Pulmonary:      Effort: Pulmonary effort is normal       Breath sounds: Normal breath sounds  Musculoskeletal:      Cervical back: Normal range of motion  Skin:     General: Skin is warm and dry  Neurological:      General: No focal deficit present  Psychiatric:         Mood and Affect: Mood normal          Behavior: Behavior normal          Thought Content:  Thought content normal          Judgment: Judgment normal        Claudeen Coe, CRNP  "

## 2023-06-01 ENCOUNTER — APPOINTMENT (OUTPATIENT)
Dept: LAB | Facility: CLINIC | Age: 63
End: 2023-06-01

## 2023-06-01 DIAGNOSIS — Z00.8 HEALTH EXAMINATION IN POPULATION SURVEY: ICD-10-CM

## 2023-06-01 LAB
CHOLEST SERPL-MCNC: 193 MG/DL
HDLC SERPL-MCNC: 53 MG/DL
LDLC SERPL CALC-MCNC: 106 MG/DL (ref 0–100)
NONHDLC SERPL-MCNC: 140 MG/DL
TRIGL SERPL-MCNC: 171 MG/DL

## 2023-06-01 PROCEDURE — 36415 COLL VENOUS BLD VENIPUNCTURE: CPT

## 2023-06-01 PROCEDURE — 80061 LIPID PANEL: CPT

## 2023-06-01 PROCEDURE — 83036 HEMOGLOBIN GLYCOSYLATED A1C: CPT

## 2023-06-02 LAB
EST. AVERAGE GLUCOSE BLD GHB EST-MCNC: 103 MG/DL
HBA1C MFR BLD: 5.2 %

## 2023-06-22 ENCOUNTER — TELEPHONE (OUTPATIENT)
Dept: FAMILY MEDICINE CLINIC | Facility: CLINIC | Age: 63
End: 2023-06-22

## 2023-06-27 ENCOUNTER — OFFICE VISIT (OUTPATIENT)
Dept: FAMILY MEDICINE CLINIC | Facility: CLINIC | Age: 63
End: 2023-06-27
Payer: COMMERCIAL

## 2023-06-27 VITALS
BODY MASS INDEX: 36.42 KG/M2 | OXYGEN SATURATION: 98 % | DIASTOLIC BLOOD PRESSURE: 80 MMHG | HEIGHT: 65 IN | TEMPERATURE: 97.6 F | WEIGHT: 218.6 LBS | RESPIRATION RATE: 14 BRPM | HEART RATE: 79 BPM | SYSTOLIC BLOOD PRESSURE: 144 MMHG

## 2023-06-27 DIAGNOSIS — E66.01 CLASS 2 SEVERE OBESITY DUE TO EXCESS CALORIES WITH SERIOUS COMORBIDITY AND BODY MASS INDEX (BMI) OF 38.0 TO 38.9 IN ADULT (HCC): Primary | ICD-10-CM

## 2023-06-27 PROCEDURE — 99213 OFFICE O/P EST LOW 20 MIN: CPT | Performed by: NURSE PRACTITIONER

## 2023-06-27 NOTE — ASSESSMENT & PLAN NOTE
Has been doing well with Wegovy  Denies any nausea  Does have some constipation, take stool softener  Continue fluid, fiber  Will increase Wegovy to 1 7    Continue with low calorie diet, increase exercise activity continue with fluid hydration

## 2023-06-27 NOTE — PROGRESS NOTES
Name: Crescencio Healy      : 1960      MRN: [id number]  Encounter Provider: FINESSE Wu  Encounter Date: 2023   Encounter department: [place] [address]  Class 2 severe obesity due to excess calories with serious comorbidity and body mass index (BMI) of 38 0 to 38 9 in adult [facility])  Assessment & Plan:  Has been doing well with [id number]  Denies any nausea  Does have some constipation, take stool softener  Continue fluid, fiber  Will increase Wegovy to 1 7  Continue with low calorie diet, increase exercise activity continue with fluid hydration    Orders:  -     Semaglutide-Weight Management (WEGOVY) 1 7 MG/0 75ML; Inject 0 75 mL (1 7 mg total) under the skin once a week for 4 doses        Depression Screening and Follow-up Plan: Patient was screened for depression during today's encounter  They screened negative with a PHQ-2 score of 0  Subjective      Patient is here to follow-up on weight loss efforts  Has been doing well on the [id number]  Continues to follow weight watchers  Review of Systems   Constitutional: Negative  HENT: Negative  Eyes: Negative  Respiratory: Negative  Cardiovascular: Negative  Gastrointestinal: Positive for constipation  Negative for nausea  Endocrine: Negative  Genitourinary: Negative  Musculoskeletal: Negative  Skin: Negative  Allergic/Immunologic: Negative  Neurological: Negative  Psychiatric/Behavioral: Negative          Current Outpatient Medications on File Prior to Visit   Medication Sig   • calcium citrate (CALCITRATE) 950 MG tablet Take 1 tablet by mouth daily   • cholecalciferol (VITAMIN D3) 25 mcg (1,000 units) tablet    • levothyroxine (Euthyrox) 50 mcg tablet Take 1 tablet (50 mcg total) by mouth daily in the early morning   • metoprolol succinate (TOPROL-XL) 25 mg 24 hr tablet Take 1 tablet (25 mg total) by mouth daily   • Multiple Vitamins-Minerals (MULTIVITAMIN WITH "MINERALS) tablet Take 1 tablet by mouth daily   • Probiotic Product (PROBIOTIC DAILY PO) Take by mouth   • vitamin B-12 (VITAMIN B-12) 1,000 mcg tablet    • [DISCONTINUED] Semaglutide-Weight Management (WEGOVY) 1 MG/0 5ML Inject 0 5 mL (1 mg total) under the skin once a week   • ketoconazole (NIZORAL) 2 % cream Apply topically daily       Objective     /80   Pulse 79   Temp 97 6 °F (36 4 °C) (Temporal)   Resp 14   Ht 5' 5\" (1 651 m)   Wt 99 2 kg (218 lb 9 6 oz)   SpO2 98%   BMI 36 38 kg/m²     Physical Exam  Vitals and nursing note reviewed  Constitutional:       Appearance: She is well-developed  She is obese  HENT:      Head: Normocephalic and atraumatic  Cardiovascular:      Rate and Rhythm: Normal rate and regular rhythm  Pulses: Normal pulses  Heart sounds: Normal heart sounds  Pulmonary:      Effort: Pulmonary effort is normal       Breath sounds: Normal breath sounds  Musculoskeletal:         General: Normal range of motion  Cervical back: Normal range of motion  Skin:     General: Skin is warm and dry  Neurological:      General: No focal deficit present  Mental Status: She is alert and oriented to person, place, and time  Psychiatric:         Mood and Affect: Mood normal          Behavior: Behavior normal          Thought Content:  Thought content normal          Judgment: Judgment normal        Елена Sensor, CRNP  "

## 2023-07-18 DIAGNOSIS — E03.9 HYPOTHYROIDISM, UNSPECIFIED TYPE: ICD-10-CM

## 2023-07-18 DIAGNOSIS — I10 PRIMARY HYPERTENSION: ICD-10-CM

## 2023-07-18 RX ORDER — LEVOTHYROXINE SODIUM 0.05 MG/1
50 TABLET ORAL
Qty: 90 TABLET | Refills: 0 | Status: SHIPPED | OUTPATIENT
Start: 2023-07-18

## 2023-07-18 RX ORDER — METOPROLOL SUCCINATE 25 MG/1
25 TABLET, EXTENDED RELEASE ORAL DAILY
Qty: 90 TABLET | Refills: 0 | Status: SHIPPED | OUTPATIENT
Start: 2023-07-18

## 2023-07-28 ENCOUNTER — OFFICE VISIT (OUTPATIENT)
Dept: FAMILY MEDICINE CLINIC | Facility: CLINIC | Age: 63
End: 2023-07-28
Payer: COMMERCIAL

## 2023-07-28 VITALS
HEIGHT: 65 IN | DIASTOLIC BLOOD PRESSURE: 82 MMHG | WEIGHT: 212 LBS | TEMPERATURE: 98.1 F | HEART RATE: 75 BPM | SYSTOLIC BLOOD PRESSURE: 144 MMHG | BODY MASS INDEX: 35.32 KG/M2 | OXYGEN SATURATION: 96 % | RESPIRATION RATE: 14 BRPM

## 2023-07-28 DIAGNOSIS — R21 RASH: ICD-10-CM

## 2023-07-28 DIAGNOSIS — E66.01 CLASS 2 SEVERE OBESITY DUE TO EXCESS CALORIES WITH SERIOUS COMORBIDITY AND BODY MASS INDEX (BMI) OF 38.0 TO 38.9 IN ADULT (HCC): Primary | ICD-10-CM

## 2023-07-28 DIAGNOSIS — I10 PRIMARY HYPERTENSION: ICD-10-CM

## 2023-07-28 PROCEDURE — 99214 OFFICE O/P EST MOD 30 MIN: CPT | Performed by: NURSE PRACTITIONER

## 2023-07-28 RX ORDER — NYSTATIN 100000 U/G
CREAM TOPICAL 2 TIMES DAILY
Qty: 30 G | Refills: 0 | Status: SHIPPED | OUTPATIENT
Start: 2023-07-28

## 2023-08-01 NOTE — PROGRESS NOTES
Name: Christa Ngo      : 1960      MRN: [id number]  Encounter Provider: FINESSE Guadalupe  Encounter Date: 2023   Encounter department: [facility] [address]     1. Class 2 severe obesity due to excess calories with serious comorbidity and body mass index (BMI) of 38.0 to 38.9 in adult [facility])  Assessment & Plan:  Patient has been doing well on Wegovy. Continue medication. Continue low calorie diet. Increase exercise activity as tolerated. Orders:  -     Semaglutide-Weight Management (WEGOVY) 2.4 MG/0.75ML; Inject 0.75 mL (2.4 mg total) under the skin once a week for 4 doses Please send to [facility]    2. Rash  -     nystatin (MYCOSTATIN) cream; Apply topically 2 (two) times a day    3. Primary hypertension  Assessment & Plan:  Pressure slightly elevated in office. Continue medication. Continue weight loss efforts. Heart healthy diet          Depression Screening and Follow-up Plan: Patient was screened for depression during today's encounter. They screened negative with a PHQ-2 score of 0. Subjective      Patient is here for follow-up on weight loss efforts. Has been taking Wegovy, doing well on the medication. Patient has been getting rashes under her pannus, her arms. Has a lot of extra skin in those areas. Review of Systems   Constitutional: Negative. HENT: Negative. Eyes: Negative. Respiratory: Negative. Cardiovascular: Negative. Gastrointestinal: Negative. Endocrine: Negative. Genitourinary: Negative. Musculoskeletal: Negative. Allergic/Immunologic: Negative. Neurological: Negative. Psychiatric/Behavioral: Negative.         Current Outpatient Medications on File Prior to Visit   Medication Sig   • calcium citrate (CALCITRATE) 950 MG tablet Take 1 tablet by mouth daily   • cholecalciferol (VITAMIN D3) 25 mcg (1,000 units) tablet    • levothyroxine (Euthyrox) 50 mcg tablet Take 1 tablet (50 mcg total) by mouth daily in the early morning   • metoprolol succinate (TOPROL-XL) 25 mg 24 hr tablet Take 1 tablet (25 mg total) by mouth daily   • Multiple Vitamins-Minerals (MULTIVITAMIN WITH MINERALS) tablet Take 1 tablet by mouth daily   • Probiotic Product (PROBIOTIC DAILY PO) Take by mouth   • vitamin B-12 (VITAMIN B-12) 1,000 mcg tablet        Objective     /82   Pulse 75   Temp 98.1 °F (36.7 °C) (Temporal)   Resp 14   Ht 5' 5" (1.651 m)   Wt 96.2 kg (212 lb)   SpO2 96%   BMI 35.28 kg/m²     Physical Exam  Vitals and nursing note reviewed. Constitutional:       Appearance: She is well-developed. She is obese. HENT:      Head: Normocephalic and atraumatic. Cardiovascular:      Rate and Rhythm: Normal rate and regular rhythm. Pulses: Normal pulses. Pulmonary:      Effort: Pulmonary effort is normal.      Breath sounds: Normal breath sounds. Abdominal:      General: Bowel sounds are normal.      Palpations: Abdomen is soft. Musculoskeletal:         General: Normal range of motion. Cervical back: Normal range of motion. Skin:     General: Skin is warm and dry. Findings: Rash (Pannus and arm) present. Neurological:      Mental Status: She is alert and oriented to person, place, and time. Psychiatric:         Mood and Affect: Mood normal.         Behavior: Behavior normal.         Thought Content:  Thought content normal.         Judgment: Judgment normal.       FINESSE Ríos

## 2023-08-01 NOTE — ASSESSMENT & PLAN NOTE
Pressure slightly elevated in office. Continue medication. Continue weight loss efforts.   Heart healthy diet

## 2023-08-01 NOTE — ASSESSMENT & PLAN NOTE
Patient has been doing well on Wegovy. Continue medication. Continue low calorie diet. Increase exercise activity as tolerated.

## 2023-10-17 DIAGNOSIS — E03.9 HYPOTHYROIDISM, UNSPECIFIED TYPE: ICD-10-CM

## 2023-10-17 DIAGNOSIS — I10 PRIMARY HYPERTENSION: ICD-10-CM

## 2023-10-17 RX ORDER — METOPROLOL SUCCINATE 25 MG/1
25 TABLET, EXTENDED RELEASE ORAL DAILY
Qty: 90 TABLET | Refills: 0 | Status: SHIPPED | OUTPATIENT
Start: 2023-10-17

## 2023-10-17 RX ORDER — LEVOTHYROXINE SODIUM 0.05 MG/1
50 TABLET ORAL
Qty: 90 TABLET | Refills: 0 | Status: SHIPPED | OUTPATIENT
Start: 2023-10-17

## 2023-11-17 ENCOUNTER — OFFICE VISIT (OUTPATIENT)
Dept: FAMILY MEDICINE CLINIC | Facility: CLINIC | Age: 63
End: 2023-11-17
Payer: COMMERCIAL

## 2023-11-17 VITALS
HEIGHT: 65 IN | BODY MASS INDEX: 32.32 KG/M2 | DIASTOLIC BLOOD PRESSURE: 88 MMHG | TEMPERATURE: 97.4 F | RESPIRATION RATE: 16 BRPM | HEART RATE: 71 BPM | OXYGEN SATURATION: 98 % | WEIGHT: 194 LBS | SYSTOLIC BLOOD PRESSURE: 138 MMHG

## 2023-11-17 DIAGNOSIS — E66.01 CLASS 2 SEVERE OBESITY DUE TO EXCESS CALORIES WITH SERIOUS COMORBIDITY AND BODY MASS INDEX (BMI) OF 38.0 TO 38.9 IN ADULT: Primary | ICD-10-CM

## 2023-11-17 DIAGNOSIS — I10 PRIMARY HYPERTENSION: ICD-10-CM

## 2023-11-17 PROCEDURE — 99213 OFFICE O/P EST LOW 20 MIN: CPT | Performed by: NURSE PRACTITIONER

## 2023-11-17 RX ORDER — SEMAGLUTIDE 2.4 MG/.75ML
INJECTION, SOLUTION SUBCUTANEOUS
COMMUNITY
Start: 2023-10-20 | End: 2023-11-17 | Stop reason: SDUPTHER

## 2023-11-17 RX ORDER — SEMAGLUTIDE 2.4 MG/.75ML
2.4 INJECTION, SOLUTION SUBCUTANEOUS WEEKLY
Qty: 9 ML | Refills: 3 | Status: SHIPPED | OUTPATIENT
Start: 2023-11-17 | End: 2024-02-03

## 2023-11-17 NOTE — PROGRESS NOTES
Name: Pineda Lieberman      : 1960      MRN: [id number]  Encounter Provider: FINESSE Guzman  Encounter Date: 2023   Encounter department: [facility]     1. Class 2 severe obesity due to excess calories with serious comorbidity and body mass index (BMI) of 38.0 to 38.9 in adult   Assessment & Plan:  Patient has been doing well on Wegovy and weight watchers. Does have some constipation. Discussed taking MiraLAX. Continue with maintaining low calorie nutrition, exercise as tolerated. Also discussed with patient to take medication break may take [facility] 3 times a month instead of 4. Blood work ordered. Orders:  -     Wegovy 2.4 MG/0.75ML; Inject 0.75 mL (2.4 mg total) under the skin once a week for 12 doses  -     CBC and differential; Future  -     Lipid panel; Future  -     Albumin / creatinine urine ratio  -     Comprehensive metabolic panel; Future    2. Primary hypertension  Assessment & Plan:  Blood pressure has improved. Patient has lost 40 pounds in the last year. Continue weight loss efforts. Continue meds          Depression Screening and Follow-up Plan: Patient was screened for depression during today's encounter. They screened negative with a PHQ-2 score of 0. Subjective      Patient is here to follow-up on weight loss efforts. Has been taking Wegovy. Has lost about 40 pounds in the last 8 months. Also has been following weight watchers. Feels well. Blood pressure has been good. Denies any abdominal discomfort, nausea, diarrhea. Does have intermittent constipation has been taking stool softener      Review of Systems   Constitutional: Negative. HENT: Negative. Eyes: Negative. Respiratory: Negative. Cardiovascular: Negative. Gastrointestinal:  Positive for constipation. Negative for diarrhea and nausea. Endocrine: Negative. Genitourinary: Negative. Musculoskeletal: Negative.     Allergic/Immunologic: Negative. Neurological: Negative. Psychiatric/Behavioral: Negative. Current Outpatient Medications on File Prior to Visit   Medication Sig   • calcium citrate (CALCITRATE) 950 MG tablet Take 1 tablet by mouth daily   • cholecalciferol (VITAMIN D3) 25 mcg (1,000 units) tablet    • levothyroxine (Euthyrox) 50 mcg tablet Take 1 tablet (50 mcg total) by mouth daily in the early morning   • metoprolol succinate (TOPROL-XL) 25 mg 24 hr tablet Take 1 tablet (25 mg total) by mouth daily   • Multiple Vitamins-Minerals (MULTIVITAMIN WITH MINERALS) tablet Take 1 tablet by mouth daily   • Probiotic Product (PROBIOTIC DAILY PO) Take by mouth   • [DISCONTINUED] Wegovy 2.4 MG/0.75ML    • nystatin (MYCOSTATIN) cream Apply topically 2 (two) times a day (Patient not taking: Reported on 11/17/2023)   • [DISCONTINUED] vitamin B-12 (VITAMIN B-12) 1,000 mcg tablet        Objective     /88   Pulse 71   Temp (!) 97.4 °F (36.3 °C) (Temporal)   Resp 16   Ht 5' 5" (1.651 m)   Wt 88 kg (194 lb)   SpO2 98%   BMI 32.28 kg/m²     Physical Exam  Vitals and nursing note reviewed. Constitutional:       Appearance: She is well-developed. She is obese. HENT:      Head: Normocephalic and atraumatic. Eyes:      Pupils: Pupils are equal, round, and reactive to light. Cardiovascular:      Rate and Rhythm: Normal rate and regular rhythm. Pulses: Normal pulses. Heart sounds: Normal heart sounds. Pulmonary:      Effort: Pulmonary effort is normal.      Breath sounds: Normal breath sounds. Abdominal:      General: Bowel sounds are normal.      Palpations: Abdomen is soft. Musculoskeletal:         General: Normal range of motion. Cervical back: Normal range of motion. Skin:     General: Skin is warm and dry. Neurological:      General: No focal deficit present. Mental Status: She is alert and oriented to person, place, and time.    Psychiatric:         Mood and Affect: Mood normal. Behavior: Behavior normal.         Thought Content:  Thought content normal.         Judgment: Judgment normal.       FINESSE Severino

## 2023-11-17 NOTE — ASSESSMENT & PLAN NOTE
Patient has been doing well on Wegovy and weight watchers. Does have some constipation. Discussed taking MiraLAX. Continue with maintaining low calorie nutrition, exercise as tolerated. Also discussed with patient to take medication break may take Tura Ni 3 times a month instead of 4. Blood work ordered.

## 2023-11-17 NOTE — ASSESSMENT & PLAN NOTE
Blood pressure has improved. Patient has lost 40 pounds in the last year. Continue weight loss efforts.  Continue meds

## 2023-12-15 DIAGNOSIS — E66.01 CLASS 2 SEVERE OBESITY DUE TO EXCESS CALORIES WITH SERIOUS COMORBIDITY AND BODY MASS INDEX (BMI) OF 38.0 TO 38.9 IN ADULT: ICD-10-CM

## 2023-12-15 RX ORDER — SEMAGLUTIDE 2.4 MG/.75ML
2.4 INJECTION, SOLUTION SUBCUTANEOUS WEEKLY
Qty: 9 ML | Refills: 0 | Status: SHIPPED | OUTPATIENT
Start: 2023-12-15 | End: 2024-03-02

## 2024-01-11 DIAGNOSIS — E66.01 CLASS 2 SEVERE OBESITY DUE TO EXCESS CALORIES WITH SERIOUS COMORBIDITY AND BODY MASS INDEX (BMI) OF 38.0 TO 38.9 IN ADULT: ICD-10-CM

## 2024-01-11 DIAGNOSIS — E03.9 HYPOTHYROIDISM, UNSPECIFIED TYPE: ICD-10-CM

## 2024-01-11 DIAGNOSIS — I10 PRIMARY HYPERTENSION: ICD-10-CM

## 2024-01-11 RX ORDER — METOPROLOL SUCCINATE 25 MG/1
25 TABLET, EXTENDED RELEASE ORAL DAILY
Qty: 90 TABLET | Refills: 0 | Status: SHIPPED | OUTPATIENT
Start: 2024-01-11

## 2024-01-11 RX ORDER — SEMAGLUTIDE 2.4 MG/.75ML
2.4 INJECTION, SOLUTION SUBCUTANEOUS WEEKLY
Qty: 9 ML | Refills: 0 | Status: SHIPPED | OUTPATIENT
Start: 2024-01-11 | End: 2024-03-29

## 2024-01-11 RX ORDER — LEVOTHYROXINE SODIUM 0.05 MG/1
50 TABLET ORAL
Qty: 90 TABLET | Refills: 0 | Status: SHIPPED | OUTPATIENT
Start: 2024-01-11

## 2024-02-08 DIAGNOSIS — E66.01 CLASS 2 SEVERE OBESITY DUE TO EXCESS CALORIES WITH SERIOUS COMORBIDITY AND BODY MASS INDEX (BMI) OF 38.0 TO 38.9 IN ADULT: ICD-10-CM

## 2024-02-08 RX ORDER — SEMAGLUTIDE 2.4 MG/.75ML
2.4 INJECTION, SOLUTION SUBCUTANEOUS WEEKLY
Qty: 9 ML | Refills: 0 | Status: SHIPPED | OUTPATIENT
Start: 2024-02-08 | End: 2024-04-26

## 2024-03-07 DIAGNOSIS — E66.01 CLASS 2 SEVERE OBESITY DUE TO EXCESS CALORIES WITH SERIOUS COMORBIDITY AND BODY MASS INDEX (BMI) OF 38.0 TO 38.9 IN ADULT: ICD-10-CM

## 2024-03-07 RX ORDER — SEMAGLUTIDE 2.4 MG/.75ML
2.4 INJECTION, SOLUTION SUBCUTANEOUS WEEKLY
Qty: 9 ML | Refills: 0 | Status: SHIPPED | OUTPATIENT
Start: 2024-03-07 | End: 2024-05-24

## 2024-04-05 DIAGNOSIS — E66.01 CLASS 2 SEVERE OBESITY DUE TO EXCESS CALORIES WITH SERIOUS COMORBIDITY AND BODY MASS INDEX (BMI) OF 38.0 TO 38.9 IN ADULT (HCC): ICD-10-CM

## 2024-04-09 RX ORDER — SEMAGLUTIDE 2.4 MG/.75ML
2.4 INJECTION, SOLUTION SUBCUTANEOUS WEEKLY
Qty: 9 ML | Refills: 0 | Status: SHIPPED | OUTPATIENT
Start: 2024-04-09 | End: 2024-06-26

## 2024-04-12 DIAGNOSIS — E03.9 HYPOTHYROIDISM, UNSPECIFIED TYPE: ICD-10-CM

## 2024-04-12 DIAGNOSIS — I10 PRIMARY HYPERTENSION: ICD-10-CM

## 2024-04-12 RX ORDER — METOPROLOL SUCCINATE 25 MG/1
25 TABLET, EXTENDED RELEASE ORAL DAILY
Qty: 90 TABLET | Refills: 0 | Status: SHIPPED | OUTPATIENT
Start: 2024-04-12

## 2024-04-12 RX ORDER — LEVOTHYROXINE SODIUM 0.05 MG/1
50 TABLET ORAL
Qty: 90 TABLET | Refills: 0 | Status: SHIPPED | OUTPATIENT
Start: 2024-04-12

## 2024-04-13 ENCOUNTER — APPOINTMENT (OUTPATIENT)
Dept: LAB | Facility: HOSPITAL | Age: 64
End: 2024-04-13
Payer: COMMERCIAL

## 2024-04-13 DIAGNOSIS — E66.01 CLASS 2 SEVERE OBESITY DUE TO EXCESS CALORIES WITH SERIOUS COMORBIDITY AND BODY MASS INDEX (BMI) OF 38.0 TO 38.9 IN ADULT (HCC): ICD-10-CM

## 2024-04-13 LAB
ALBUMIN SERPL BCP-MCNC: 4 G/DL (ref 3.5–5)
ALP SERPL-CCNC: 73 U/L (ref 34–104)
ALT SERPL W P-5'-P-CCNC: 19 U/L (ref 7–52)
ANION GAP SERPL CALCULATED.3IONS-SCNC: 3 MMOL/L (ref 4–13)
AST SERPL W P-5'-P-CCNC: 19 U/L (ref 13–39)
BASOPHILS # BLD AUTO: 0.05 THOUSANDS/ÂΜL (ref 0–0.1)
BASOPHILS NFR BLD AUTO: 1 % (ref 0–1)
BILIRUB SERPL-MCNC: 0.33 MG/DL (ref 0.2–1)
BUN SERPL-MCNC: 24 MG/DL (ref 5–25)
CALCIUM SERPL-MCNC: 9.7 MG/DL (ref 8.4–10.2)
CHLORIDE SERPL-SCNC: 105 MMOL/L (ref 96–108)
CHOLEST SERPL-MCNC: 166 MG/DL
CO2 SERPL-SCNC: 31 MMOL/L (ref 21–32)
CREAT SERPL-MCNC: 0.71 MG/DL (ref 0.6–1.3)
EOSINOPHIL # BLD AUTO: 0.12 THOUSAND/ÂΜL (ref 0–0.61)
EOSINOPHIL NFR BLD AUTO: 1 % (ref 0–6)
ERYTHROCYTE [DISTWIDTH] IN BLOOD BY AUTOMATED COUNT: 12.8 % (ref 11.6–15.1)
GFR SERPL CREATININE-BSD FRML MDRD: 90 ML/MIN/1.73SQ M
GLUCOSE P FAST SERPL-MCNC: 84 MG/DL (ref 65–99)
HCT VFR BLD AUTO: 40.7 % (ref 34.8–46.1)
HDLC SERPL-MCNC: 42 MG/DL
HGB BLD-MCNC: 13.4 G/DL (ref 11.5–15.4)
IMM GRANULOCYTES # BLD AUTO: 0.02 THOUSAND/UL (ref 0–0.2)
IMM GRANULOCYTES NFR BLD AUTO: 0 % (ref 0–2)
LDLC SERPL CALC-MCNC: 96 MG/DL (ref 0–100)
LYMPHOCYTES # BLD AUTO: 2.62 THOUSANDS/ÂΜL (ref 0.6–4.47)
LYMPHOCYTES NFR BLD AUTO: 28 % (ref 14–44)
MCH RBC QN AUTO: 30.1 PG (ref 26.8–34.3)
MCHC RBC AUTO-ENTMCNC: 32.9 G/DL (ref 31.4–37.4)
MCV RBC AUTO: 92 FL (ref 82–98)
MONOCYTES # BLD AUTO: 0.55 THOUSAND/ÂΜL (ref 0.17–1.22)
MONOCYTES NFR BLD AUTO: 6 % (ref 4–12)
NEUTROPHILS # BLD AUTO: 6.15 THOUSANDS/ÂΜL (ref 1.85–7.62)
NEUTS SEG NFR BLD AUTO: 64 % (ref 43–75)
NONHDLC SERPL-MCNC: 124 MG/DL
NRBC BLD AUTO-RTO: 0 /100 WBCS
PLATELET # BLD AUTO: 320 THOUSANDS/UL (ref 149–390)
PMV BLD AUTO: 9.6 FL (ref 8.9–12.7)
POTASSIUM SERPL-SCNC: 4.6 MMOL/L (ref 3.5–5.3)
PROT SERPL-MCNC: 7.3 G/DL (ref 6.4–8.4)
RBC # BLD AUTO: 4.45 MILLION/UL (ref 3.81–5.12)
SODIUM SERPL-SCNC: 139 MMOL/L (ref 135–147)
TRIGL SERPL-MCNC: 139 MG/DL
WBC # BLD AUTO: 9.51 THOUSAND/UL (ref 4.31–10.16)

## 2024-04-13 PROCEDURE — 85025 COMPLETE CBC W/AUTO DIFF WBC: CPT

## 2024-04-13 PROCEDURE — 80053 COMPREHEN METABOLIC PANEL: CPT

## 2024-04-13 PROCEDURE — 80061 LIPID PANEL: CPT

## 2024-04-13 PROCEDURE — 36415 COLL VENOUS BLD VENIPUNCTURE: CPT

## 2024-04-16 ENCOUNTER — OFFICE VISIT (OUTPATIENT)
Dept: FAMILY MEDICINE CLINIC | Facility: CLINIC | Age: 64
End: 2024-04-16
Payer: COMMERCIAL

## 2024-04-16 VITALS
HEIGHT: 65 IN | OXYGEN SATURATION: 96 % | DIASTOLIC BLOOD PRESSURE: 82 MMHG | WEIGHT: 179.4 LBS | RESPIRATION RATE: 14 BRPM | TEMPERATURE: 97.6 F | HEART RATE: 83 BPM | BODY MASS INDEX: 29.89 KG/M2 | SYSTOLIC BLOOD PRESSURE: 134 MMHG

## 2024-04-16 DIAGNOSIS — E03.9 ACQUIRED HYPOTHYROIDISM: ICD-10-CM

## 2024-04-16 DIAGNOSIS — L98.7 EXCESSIVE SKIN AND SUBCUTANEOUS TISSUE: ICD-10-CM

## 2024-04-16 DIAGNOSIS — B49 FUNGAL INFECTION: ICD-10-CM

## 2024-04-16 DIAGNOSIS — Z00.00 ANNUAL PHYSICAL EXAM: Primary | ICD-10-CM

## 2024-04-16 DIAGNOSIS — R21 RASH: ICD-10-CM

## 2024-04-16 DIAGNOSIS — I10 PRIMARY HYPERTENSION: ICD-10-CM

## 2024-04-16 DIAGNOSIS — E66.01 CLASS 2 SEVERE OBESITY DUE TO EXCESS CALORIES WITH SERIOUS COMORBIDITY AND BODY MASS INDEX (BMI) OF 38.0 TO 38.9 IN ADULT (HCC): ICD-10-CM

## 2024-04-16 DIAGNOSIS — D58.2 ABNORMAL HEMOGLOBIN (HCC): ICD-10-CM

## 2024-04-16 DIAGNOSIS — Z12.31 ENCOUNTER FOR SCREENING MAMMOGRAM FOR MALIGNANT NEOPLASM OF BREAST: ICD-10-CM

## 2024-04-16 PROCEDURE — 99396 PREV VISIT EST AGE 40-64: CPT | Performed by: NURSE PRACTITIONER

## 2024-04-16 RX ORDER — NYSTATIN 100000 U/G
CREAM TOPICAL 2 TIMES DAILY
Qty: 30 G | Refills: 0 | Status: SHIPPED | OUTPATIENT
Start: 2024-04-16

## 2024-04-16 NOTE — PROGRESS NOTES
ADULT ANNUAL PHYSICAL  [facility]    NAME: Yvonne Hirsch  AGE: 64 y.o. SEX: female  : 1960     DATE: 2024     Assessment and Plan:     Problem List Items Addressed This Visit        Cardiovascular and Mediastinum    Hypertension     Blood pressure is at goal.  Continue with medication.  Continue with low-salt heart healthy diet.  Continue with weight loss efforts            Endocrine    Hypothyroidism    Relevant Orders    TSH, 3rd generation with Free T4 reflex       Other    Annual physical exam - Primary     Annual physical completed. Declined pap smear. UTD with colon cancer screenng. Mammogram scheduled           Class 2 severe obesity due to excess calories with serious comorbidity and body mass index (BMI) of 38.0 to 38.9 in adult (HCC)     Patient has been taking Wegovy for a year.  As of to the highest dose.  Denies any side effects of medication.  Initially had some constipation and nausea.  Those symptoms have resolved.  Patient was lost 54 pounds while on medication.  Continue with low calorie diet.  Patient was on weight watchers.  Continue with exercise activity.  Wegovy ordered.  Discussed making taking every other week.         Fungal infection     Patient has excess skin under both arms.  Gets rash at times due to extra skin.  Nystatin cream ordered.  Referral made to plastics for management.        Other Visit Diagnoses     Excessive skin and subcutaneous tissue        Relevant Orders    Ambulatory Referral to Plastic Surgery    Encounter for screening mammogram for malignant neoplasm of breast        Relevant Orders    Mammo screening bilateral w 3d & cad    Abnormal hemoglobin (HCC)        Relevant Orders    Hemoglobin A1C    Rash        Relevant Medications    nystatin (MYCOSTATIN) cream            Immunizations and preventive care screenings were discussed with patient today. Appropriate education was printed on  patient's after visit summary.    Counseling:  Alcohol/drug use: discussed moderation in alcohol intake, the recommendations for healthy alcohol use, and avoidance of illicit drug use.  Dental Health: discussed importance of regular tooth brushing, flossing, and dental visits.  Injury prevention: discussed safety/seat belts, safety helmets, smoke detectors, carbon dioxide detectors, and smoking near bedding or upholstery.  Sexual health: discussed sexually transmitted diseases, partner selection, use of condoms, avoidance of unintended pregnancy, and contraceptive alternatives.  Exercise: the importance of regular exercise/physical activity was discussed. Recommend exercise 3-5 times per week for at least 30 minutes.       Depression Screening and Follow-up Plan: Patient was screened for depression during today's encounter. They screened negative with a PHQ-2 score of 0.        No follow-ups on file.     Chief Complaint:     Chief Complaint   Patient presents with   • Physical Exam      History of Present Illness:     Adult Annual Physical   Patient here for a comprehensive physical exam. The patient reports problems - under arm rash  .    Diet and Physical Activity  Diet/Nutrition: well balanced diet.   Exercise: no formal exercise.      Depression Screening  PHQ-2/9 Depression Screening    Little interest or pleasure in doing things: 0 - not at all  Feeling down, depressed, or hopeless: 0 - not at all  PHQ-2 Score: 0  PHQ-2 Interpretation: Negative depression screen       General Health  Sleep: sleeps well.   Hearing: normal - bilateral.  Vision: most recent eye exam >1 year ago and wears glasses.   Dental: regular dental visits.       /GYN Health  Follows with gynecology? no   Patient is: postmenopausal  Last menstrual period: 58    Contraceptive method: menopause.    Advanced Care Planning  Do you have an advanced directive? no  Do you have a durable medical power of ? no  ACP document given to the  patient? no     Review of Systems:     Review of Systems   Constitutional: Negative.    HENT: Negative.     Eyes: Negative.    Respiratory: Negative.     Cardiovascular: Negative.    Gastrointestinal: Negative.    Endocrine: Negative.    Genitourinary: Negative.    Musculoskeletal: Negative.    Skin:  Positive for rash (under arm rash and redness).   Allergic/Immunologic: Negative.    Neurological: Negative.    Psychiatric/Behavioral: Negative.        Past Medical History:     Past Medical History:   Diagnosis Date   • Disease of thyroid gland    • Eating disorder Birth    Over eating   • Hypertension    • Obesity    • Postgastrectomy malabsorption    • Shingles 20    Mild case - resolved      Past Surgical History:     Past Surgical History:   Procedure Laterality Date   •  SECTION     • GASTRIC BYPASS     • GA LAPS ABD PRTM&OMENTUM DX W/WO SPEC BR/WA SPX N/A 2019    Procedure: LAPAROSCOPY DIAGNOSTIC CONVERTED TO OPEN; SMALL BOWEL RESECTION; LYSIS OF ADHESIONS;  Surgeon: John Simon MD;  Location: [facility] OR;  Service: General   • SMALL INTESTINE SURGERY  19      Social History:     Social History     Socioeconomic History   • Marital status: /Civil Union     Spouse name: None   • Number of children: None   • Years of education: None   • Highest education level: None   Occupational History   • None   Tobacco Use   • Smoking status: Former     Current packs/day: 0.00     Types: Cigarettes     Quit date: 1992     Years since quittin.3   • Smokeless tobacco: Never   Vaping Use   • Vaping status: Never Used   Substance and Sexual Activity   • Alcohol use: Yes     Alcohol/week: 2.0 standard drinks of alcohol     Types: 2 Glasses of wine per week     Comment: Rarely   • Drug use: Yes     Comment: socially   • Sexual activity: Yes     Partners: Male     Birth control/protection: Post-menopausal   Other Topics Concern   • None   Social History Narrative   • None     Social  "Determinants of Health     Financial Resource Strain: Not on file   Food Insecurity: Not on file   Transportation Needs: Not on file   Physical Activity: Not on file   Stress: Not on file   Social Connections: Not on file   Intimate Partner Violence: Not on file   Housing Stability: Not on file      Family History:     Family History   Problem Relation Age of Onset   • Hypertension Mother    • Thyroid disease Mother    • Cancer Mother         Thrombocytopenia   • Hearing loss Mother    • Glaucoma Mother    • COPD Father    • Coronary artery disease Father    • Mental illness Brother         Schizoeffective Disorder   • Schizoaffective Disorder  Brother    • Schizophrenia Paternal Uncle       Current Medications:     Current Outpatient Medications   Medication Sig Dispense Refill   • calcium citrate (CALCITRATE) 950 MG tablet Take 1 tablet by mouth daily     • cholecalciferol (VITAMIN D3) 25 mcg (1,000 units) tablet      • levothyroxine (Euthyrox) 50 mcg tablet Take 1 tablet (50 mcg total) by mouth daily in the early morning 90 tablet 0   • metoprolol succinate (TOPROL-XL) 25 mg 24 hr tablet Take 1 tablet (25 mg total) by mouth daily 90 tablet 0   • Multiple Vitamins-Minerals (MULTIVITAMIN WITH MINERALS) tablet Take 1 tablet by mouth daily     • nystatin (MYCOSTATIN) cream Apply topically 2 (two) times a day 30 g 0   • Probiotic Product (PROBIOTIC DAILY PO) Take by mouth     • Wegovy 2.4 MG/0.75ML Inject 0.75 mL (2.4 mg total) under the skin once a week for 12 doses 9 mL 0     No current facility-administered medications for this visit.      Allergies:     No Known Allergies   Physical Exam:     /82   Pulse 83   Temp 97.6 °F (36.4 °C)   Resp 14   Ht 5' 5\" (1.651 m)   Wt 81.4 kg (179 lb 6.4 oz)   SpO2 96%   BMI 29.85 kg/m²     Physical Exam  Constitutional:       General: She is not in acute distress.     Appearance: Normal appearance. She is obese. She is not ill-appearing.   HENT:      Head: " Normocephalic and atraumatic.      Nose: Nose normal.      Mouth/Throat:      Mouth: Mucous membranes are moist.   Eyes:      Pupils: Pupils are equal, round, and reactive to light.   Cardiovascular:      Rate and Rhythm: Normal rate and regular rhythm.      Pulses: Normal pulses.      Heart sounds: Normal heart sounds.   Pulmonary:      Effort: Pulmonary effort is normal. No respiratory distress.      Breath sounds: Normal breath sounds.   Chest:      Chest wall: No tenderness.   Abdominal:      General: Abdomen is flat. Bowel sounds are normal. There is no distension.      Palpations: There is no mass.      Tenderness: There is no abdominal tenderness.   Musculoskeletal:         General: Normal range of motion.      Cervical back: Normal range of motion and neck supple.   Skin:     General: Skin is warm and dry.      Findings: Erythema and rash present.   Neurological:      General: No focal deficit present.      Mental Status: She is alert and oriented to person, place, and time.   Psychiatric:         Attention and Perception: Attention normal.         Mood and Affect: Mood normal. Affect is tearful.         Speech: Speech is delayed.         Behavior: Behavior normal. Behavior is cooperative.         Thought Content: Thought content normal.         Cognition and Memory: Cognition normal.         Judgment: Judgment normal.          FINESSE Martínez  [facility]

## 2024-04-17 ENCOUNTER — TELEPHONE (OUTPATIENT)
Dept: FAMILY MEDICINE CLINIC | Facility: CLINIC | Age: 64
End: 2024-04-17

## 2024-04-17 NOTE — TELEPHONE ENCOUNTER
Prior Auth has been initiated and sent to patients insurance plan. Key [id number], awaiting determination

## 2024-04-17 NOTE — ASSESSMENT & PLAN NOTE
Annual physical completed. Declined pap smear. UTD with colon cancer screenng. Mammogram scheduled

## 2024-04-17 NOTE — ASSESSMENT & PLAN NOTE
Patient has excess skin under both arms.  Gets rash at times due to extra skin.  Nystatin cream ordered.  Referral made to plastics for management.

## 2024-04-17 NOTE — ASSESSMENT & PLAN NOTE
Patient has been taking Wegovy for a year.  As of to the highest dose.  Denies any side effects of medication.  Initially had some constipation and nausea.  Those symptoms have resolved.  Patient was lost 54 pounds while on medication.  Continue with low calorie diet.  Patient was on weight watchers.  Continue with exercise activity.  Wegovy ordered.  Discussed making taking every other week.

## 2024-04-17 NOTE — ASSESSMENT & PLAN NOTE
Blood pressure is at goal.  Continue with medication.  Continue with low-salt heart healthy diet.  Continue with weight loss efforts

## 2024-04-18 ENCOUNTER — HOSPITAL ENCOUNTER (OUTPATIENT)
Dept: MAMMOGRAPHY | Facility: CLINIC | Age: 64
Discharge: HOME | End: 2024-04-18
Payer: COMMERCIAL

## 2024-04-18 VITALS — HEIGHT: 65 IN | BODY MASS INDEX: 29.82 KG/M2 | WEIGHT: 179 LBS

## 2024-04-18 DIAGNOSIS — Z12.31 ENCOUNTER FOR SCREENING MAMMOGRAM FOR MALIGNANT NEOPLASM OF BREAST: ICD-10-CM

## 2024-04-18 PROCEDURE — 77067 SCR MAMMO BI INCL CAD: CPT

## 2024-04-18 PROCEDURE — 77063 BREAST TOMOSYNTHESIS BI: CPT

## 2024-05-14 DIAGNOSIS — E66.01 CLASS 2 SEVERE OBESITY DUE TO EXCESS CALORIES WITH SERIOUS COMORBIDITY AND BODY MASS INDEX (BMI) OF 38.0 TO 38.9 IN ADULT (HCC): ICD-10-CM

## 2024-05-15 RX ORDER — SEMAGLUTIDE 2.4 MG/.75ML
2.4 INJECTION, SOLUTION SUBCUTANEOUS WEEKLY
Qty: 9 ML | Refills: 0 | Status: SHIPPED | OUTPATIENT
Start: 2024-05-15 | End: 2024-08-01

## 2024-05-22 ENCOUNTER — APPOINTMENT (OUTPATIENT)
Dept: LAB | Facility: HOSPITAL | Age: 64
End: 2024-05-22
Payer: COMMERCIAL

## 2024-05-22 DIAGNOSIS — D58.2 ABNORMAL HEMOGLOBIN (HCC): ICD-10-CM

## 2024-05-22 DIAGNOSIS — E03.9 ACQUIRED HYPOTHYROIDISM: ICD-10-CM

## 2024-05-22 LAB
EST. AVERAGE GLUCOSE BLD GHB EST-MCNC: 100 MG/DL
HBA1C MFR BLD: 5.1 %
TSH SERPL DL<=0.05 MIU/L-ACNC: 3.42 UIU/ML (ref 0.45–4.5)

## 2024-05-22 PROCEDURE — 84443 ASSAY THYROID STIM HORMONE: CPT

## 2024-05-22 PROCEDURE — 83036 HEMOGLOBIN GLYCOSYLATED A1C: CPT

## 2024-05-22 PROCEDURE — 36415 COLL VENOUS BLD VENIPUNCTURE: CPT

## 2024-07-13 DIAGNOSIS — I10 PRIMARY HYPERTENSION: ICD-10-CM

## 2024-07-13 DIAGNOSIS — E03.9 HYPOTHYROIDISM, UNSPECIFIED TYPE: ICD-10-CM

## 2024-07-13 RX ORDER — METOPROLOL SUCCINATE 25 MG/1
25 TABLET, EXTENDED RELEASE ORAL DAILY
Qty: 90 TABLET | Refills: 0 | Status: SHIPPED | OUTPATIENT
Start: 2024-07-13

## 2024-07-13 RX ORDER — LEVOTHYROXINE SODIUM 0.05 MG/1
50 TABLET ORAL
Qty: 100 TABLET | Refills: 1 | Status: SHIPPED | OUTPATIENT
Start: 2024-07-13

## 2024-07-16 ENCOUNTER — COSMETIC (OUTPATIENT)
Age: 64
End: 2024-07-16
Payer: COMMERCIAL

## 2024-07-16 VITALS
BODY MASS INDEX: 28.16 KG/M2 | DIASTOLIC BLOOD PRESSURE: 80 MMHG | HEIGHT: 65 IN | WEIGHT: 169 LBS | SYSTOLIC BLOOD PRESSURE: 130 MMHG | OXYGEN SATURATION: 98 % | TEMPERATURE: 97.8 F | HEART RATE: 77 BPM

## 2024-07-16 DIAGNOSIS — L98.7 EXCESSIVE SKIN AND SUBCUTANEOUS TISSUE: ICD-10-CM

## 2024-07-16 PROCEDURE — 99204 OFFICE O/P NEW MOD 45 MIN: CPT | Performed by: PLASTIC SURGERY

## 2024-07-16 NOTE — PROGRESS NOTES
Assessment & Plan 64-year-old female with massive weight loss and excess skin of the arms for bilateral brachioplasty  1.)  Patient is an excellent candidate for bilateral brachioplasty  2.)  We discussed the risks, benefits, alternatives  3.)  All her questions were answered to her satisfaction  4.)  Because of the constellation and chronicity of her symptoms I do believe that this is a medically necessary procedure  5.)  Pictures taken, will inquire as to insurance authorization.    Discussion--  I discussed with her the option of brachioplasty, including the nature of brachioplasty, we discussed the risk of bleeding, infection, scarring, poor wound healing, damage underlying structures, need for further surgery, need for multiple procedures, loss of umbilicus, poor aesthetic result, scar migration, need for revision, contour deformity, the risk of seroma, DVT, poor aesthetic result, need for revision, need for multiple procedures.  I discussed with the patient the option of vertical and horizontal brachioplasty, discussed with her the risks, benefits, alternatives of that procedure including the above with increased risk of wound healing complication particularly at the triple point, we discussed the risk of scar migration, relapse of skin, asymmetry, need for further surgery, need for multiple procedures, we discussed the risk of poor scarring, we discussed possible relapse of skin at the elbow, damage to the medial antebrachial cutaneous nerve, axillary contracture.  All the patient's questions were answered to her satisfaction, because of the constellation chronicity of her symptoms I do believe this is a medically necessary procedure.  The patient is an excellent candidate for vertical horizontal panniculectomy.  Pictures taken, will inquire as to insurance authorization.    Counseling dominated visit, total counseling time 35 minutes, total visit time 45 minutes including documentation, review of her chart  and coordination of care.      Subjective   Patient ID: Yvonne Hirsch is a 64 y.o. female.    Vitals:    07/16/24 1301   BP: 130/80   Pulse: 77   Temp: 97.8 °F (36.6 °C)   SpO2: 98%     HPI    Patient is a 64-year-old female with a history of massive weight loss including Dontae-en-Y gastric bypass, maintenance with medications.  The patient lost approximately 93 lbs overall and now has significant loose hanging skin and isn't happy with her arm shape and contour.  The patient complains of rashing, chronic irritation in the folds of her skin.  She has redness in the area that is particularly worse with increased activity and limits her overall daily function.  The patient has tried multiple medications and medical treatment has failed to adequately resolve the issue.  She is here today to discuss surgical options.  She is a non smoker, but she does not take steroids or blood thinners, she does not have a history of DVT.  She is up to date with her mammograms.      The following portions of the patient's history were reviewed and updated as appropriate: allergies, current medications, past family history, past medical history, past social history, past surgical history, and problem list.    Review of Systems   Skin:  Positive for rash.       Objective   Physical Exam   Constitutional  She appears well-developed and well-nourished.     Eyes  Pupils are equal, round, and reactive to light. System normal.     General -             Right: Right eye extraocular movements are normal.             Left: Left eye extraocular movements are normal.       Skin  Skin is warm.     Psychiatric  She has a normal mood and affect. Her behavior is normal. Judgment and thought content normal.   Bilateral arms--significant excess of bilateral arms in the vertical and horizontal direction, she is chronic inflammation, acute inflammation and excoriation under her arms but no acute cellulitis.    Past Medical History:   Diagnosis Date     Disease of thyroid gland     Eating disorder Birth    Over eating    Hypertension     Obesity     Postgastrectomy malabsorption     Shingles 20    Mild case - resolved     Past Surgical History:   Procedure Laterality Date    BARIATRIC SURGERY  2015     SECTION      GASTRIC BYPASS      SC LAPS ABD PRTM&OMENTUM DX W/WO SPEC BR/WA SPX N/A 2019    Procedure: LAPAROSCOPY DIAGNOSTIC CONVERTED TO OPEN; SMALL BOWEL RESECTION; LYSIS OF ADHESIONS;  Surgeon: John Simon MD;  Location: [facility];  Service: General    SMALL INTESTINE SURGERY  19     Current Outpatient Medications   Medication Instructions    calcium citrate (CALCITRATE) 950 MG tablet 1 tablet, Oral, Daily    cholecalciferol (VITAMIN D3) 25 mcg (1,000 units) tablet No dose, route, or frequency recorded.    levothyroxine (EUTHYROX) 50 mcg, Oral, Daily (early morning)    metoprolol succinate (TOPROL-XL) 25 mg, Oral, Daily    Multiple Vitamins-Minerals (MULTIVITAMIN WITH MINERALS) tablet 1 tablet, Oral, Daily    nystatin (MYCOSTATIN) cream Topical, 2 times daily    Probiotic Product (PROBIOTIC DAILY PO) Oral    Wegovy 2.4 mg, Subcutaneous, Weekly       Social History     Social History Narrative    Not on file     Social History     Tobacco Use   Smoking Status Former    Current packs/day: 0.00    Average packs/day: 1 pack/day for 15.6 years (15.6 ttl pk-yrs)    Types: Cigarettes    Start date: 1976    Quit date: 1992    Years since quittin.5   Smokeless Tobacco Never

## 2024-08-06 ENCOUNTER — PREP FOR PROCEDURE (OUTPATIENT)
Dept: PLASTIC SURGERY | Facility: CLINIC | Age: 64
End: 2024-08-06

## 2024-08-06 DIAGNOSIS — Z41.1 ENCOUNTER FOR COSMETIC SURGERY: Primary | ICD-10-CM

## 2024-08-09 ENCOUNTER — TELEPHONE (OUTPATIENT)
Dept: ANESTHESIOLOGY | Facility: CLINIC | Age: 64
End: 2024-08-09

## 2024-08-12 ENCOUNTER — TELEPHONE (OUTPATIENT)
Age: 64
End: 2024-08-12

## 2024-08-14 ENCOUNTER — TELEPHONE (OUTPATIENT)
Dept: ANESTHESIOLOGY | Facility: CLINIC | Age: 64
End: 2024-08-14

## 2024-08-14 NOTE — PRE-PROCEDURE INSTRUCTIONS
Pre-Surgery Instructions:   Medication Instructions    calcium citrate (CALCITRATE) 950 MG tablet Stop taking 7 days prior to surgery.    cholecalciferol (VITAMIN D3) 25 mcg (1,000 units) tablet Stop taking 7 days prior to surgery.    levothyroxine (Euthyrox) 50 mcg tablet Take day of surgery.    metoprolol succinate (TOPROL-XL) 25 mg 24 hr tablet Take day of surgery.    Multiple Vitamins-Minerals (MULTIVITAMIN WITH MINERALS) tablet Stop taking 7 days prior to surgery.    nystatin (MYCOSTATIN) cream Stop taking 7 days prior to surgery.    Probiotic Product (PROBIOTIC DAILY PO) Stop taking 7 days prior to surgery.    Semaglutide-Weight Management (WEGOVY) 2.4 MG/0.75ML Last dose 9/1/24    Medication instructions for day surgery reviewed. Please use only a sip of water to take your instructed medications. Avoid all over the counter vitamins, supplements and NSAIDS for one week prior to surgery per anesthesia guidelines. Tylenol is ok to take as needed.     You will receive a call one business day prior to surgery with an arrival time and hospital directions. If your surgery is scheduled on a Monday, the hospital will be calling you on the Friday prior to your surgery. If you have not heard from anyone by 8pm, please call the hospital supervisor through the hospital  at [phone]. ([place] [phone] or [place] [phone]).    Do not eat or drink anything after midnight the night before your surgery, including candy, mints, lifesavers, or chewing gum. Do not drink alcohol 24hrs before your surgery. Try not to smoke at least 24hrs before your surgery.       Follow the pre surgery showering instructions as listed in the “My Surgical Experience Booklet” or otherwise provided by your surgeon's office. Do not use a blade to shave the surgical area 1 week before surgery. It is okay to use a clean electric clippers up to 24 hours before surgery. Do not apply any lotions, creams, including makeup, cologne,  deodorant, or perfumes after showering on the day of your surgery. Do not use dry shampoo, hair spray, hair gel, or any type of hair products.     No contact lenses, eye make-up, or artificial eyelashes. Remove nail polish, including gel polish, and any artificial, gel, or acrylic nails if possible. Remove all jewelry including rings and body piercing jewelry.     Wear causal clothing that is easy to take on and off. Consider your type of surgery.    Keep any valuables, jewelry, piercings at home. Please bring any specially ordered equipment (sling, braces) if indicated.    Arrange for a responsible person to drive you to and from the hospital on the day of your surgery. Please confirm the visitor policy for the day of your procedure when you receive your phone call with an arrival time.     Call the surgeon's office with any new illnesses, exposures, or additional questions prior to surgery.    Please reference your “My Surgical Experience Booklet” for additional information to prepare for your upcoming surgery.

## 2024-08-15 ENCOUNTER — HOSPITAL ENCOUNTER (OUTPATIENT)
Dept: RADIOLOGY | Facility: HOSPITAL | Age: 64
End: 2024-08-15
Payer: COMMERCIAL

## 2024-08-15 ENCOUNTER — OFFICE VISIT (OUTPATIENT)
Dept: LAB | Facility: HOSPITAL | Age: 64
End: 2024-08-15
Payer: COMMERCIAL

## 2024-08-15 ENCOUNTER — APPOINTMENT (OUTPATIENT)
Dept: LAB | Facility: HOSPITAL | Age: 64
End: 2024-08-15
Attending: PLASTIC SURGERY
Payer: COMMERCIAL

## 2024-08-15 DIAGNOSIS — Z41.1 ENCOUNTER FOR COSMETIC SURGERY: ICD-10-CM

## 2024-08-15 LAB
ANION GAP SERPL CALCULATED.3IONS-SCNC: 7 MMOL/L (ref 4–13)
ATRIAL RATE: 0 BPM
ATRIAL RATE: 76 BPM
BASOPHILS # BLD AUTO: 0.06 THOUSANDS/ÂΜL (ref 0–0.1)
BASOPHILS NFR BLD AUTO: 1 % (ref 0–1)
BUN SERPL-MCNC: 24 MG/DL (ref 5–25)
CALCIUM SERPL-MCNC: 9.6 MG/DL (ref 8.4–10.2)
CHLORIDE SERPL-SCNC: 100 MMOL/L (ref 96–108)
CO2 SERPL-SCNC: 30 MMOL/L (ref 21–32)
CREAT SERPL-MCNC: 0.67 MG/DL (ref 0.6–1.3)
EOSINOPHIL # BLD AUTO: 0.12 THOUSAND/ÂΜL (ref 0–0.61)
EOSINOPHIL NFR BLD AUTO: 1 % (ref 0–6)
ERYTHROCYTE [DISTWIDTH] IN BLOOD BY AUTOMATED COUNT: 12.6 % (ref 11.6–15.1)
GFR SERPL CREATININE-BSD FRML MDRD: 93 ML/MIN/1.73SQ M
GLUCOSE P FAST SERPL-MCNC: 81 MG/DL (ref 65–99)
HCT VFR BLD AUTO: 41.2 % (ref 34.8–46.1)
HGB BLD-MCNC: 13.3 G/DL (ref 11.5–15.4)
IMM GRANULOCYTES # BLD AUTO: 0.03 THOUSAND/UL (ref 0–0.2)
IMM GRANULOCYTES NFR BLD AUTO: 0 % (ref 0–2)
LYMPHOCYTES # BLD AUTO: 3.72 THOUSANDS/ÂΜL (ref 0.6–4.47)
LYMPHOCYTES NFR BLD AUTO: 35 % (ref 14–44)
MCH RBC QN AUTO: 29.3 PG (ref 26.8–34.3)
MCHC RBC AUTO-ENTMCNC: 32.3 G/DL (ref 31.4–37.4)
MCV RBC AUTO: 91 FL (ref 82–98)
MONOCYTES # BLD AUTO: 0.6 THOUSAND/ÂΜL (ref 0.17–1.22)
MONOCYTES NFR BLD AUTO: 6 % (ref 4–12)
NEUTROPHILS # BLD AUTO: 6.05 THOUSANDS/ÂΜL (ref 1.85–7.62)
NEUTS SEG NFR BLD AUTO: 57 % (ref 43–75)
NRBC BLD AUTO-RTO: 0 /100 WBCS
P AXIS: 93 DEGREES
PLATELET # BLD AUTO: 334 THOUSANDS/UL (ref 149–390)
PMV BLD AUTO: 10.1 FL (ref 8.9–12.7)
POTASSIUM SERPL-SCNC: 3.9 MMOL/L (ref 3.5–5.3)
PR INTERVAL: 164 MS
QRS AXIS: 0 DEGREES
QRS AXIS: 37 DEGREES
QRSD INTERVAL: 0 MS
QRSD INTERVAL: 80 MS
QT INTERVAL: 0 MS
QT INTERVAL: 420 MS
QTC INTERVAL: 0 MS
QTC INTERVAL: 472 MS
RBC # BLD AUTO: 4.54 MILLION/UL (ref 3.81–5.12)
SODIUM SERPL-SCNC: 137 MMOL/L (ref 135–147)
T WAVE AXIS: 0 DEGREES
T WAVE AXIS: 43 DEGREES
VENTRICULAR RATE: 0 BPM
VENTRICULAR RATE: 76 BPM
WBC # BLD AUTO: 10.58 THOUSAND/UL (ref 4.31–10.16)

## 2024-08-15 PROCEDURE — 80048 BASIC METABOLIC PNL TOTAL CA: CPT

## 2024-08-15 PROCEDURE — 71046 X-RAY EXAM CHEST 2 VIEWS: CPT

## 2024-08-15 PROCEDURE — 85025 COMPLETE CBC W/AUTO DIFF WBC: CPT

## 2024-08-15 PROCEDURE — 93005 ELECTROCARDIOGRAM TRACING: CPT

## 2024-08-15 PROCEDURE — 93010 ELECTROCARDIOGRAM REPORT: CPT | Performed by: INTERNAL MEDICINE

## 2024-08-15 PROCEDURE — 36415 COLL VENOUS BLD VENIPUNCTURE: CPT

## 2024-08-19 ENCOUNTER — ANESTHESIA EVENT (OUTPATIENT)
Dept: PERIOP | Facility: HOSPITAL | Age: 64
End: 2024-08-19
Payer: COMMERCIAL

## 2024-08-20 ENCOUNTER — TELEMEDICINE (OUTPATIENT)
Dept: ANESTHESIOLOGY | Facility: CLINIC | Age: 64
End: 2024-08-20
Payer: COMMERCIAL

## 2024-08-20 DIAGNOSIS — I10 PRIMARY HYPERTENSION: Primary | ICD-10-CM

## 2024-08-20 DIAGNOSIS — Z41.1 ENCOUNTER FOR COSMETIC SURGERY: ICD-10-CM

## 2024-08-20 PROCEDURE — 99242 OFF/OP CONSLTJ NEW/EST SF 20: CPT | Performed by: NURSE PRACTITIONER

## 2024-08-20 NOTE — PROGRESS NOTES
[facility]  CONSULT: SURGERY OPTIMIZATION    Brief Visit    Name: Yvonne Hirsch      : 1960      MRN: [id number]  Encounter Provider: FINESSE James  Encounter Date: 2024   Encounter department: [facility]    This Visit is being completed by telephone. The Patient is located at Home and in the following state in which I hold an active license [place]    The patient was identified by name and date of birth. Yovnne Hirsch was informed that this is a telemedicine visit and that the visit is being conducted through Telephone.  My office door was closed. No one else was in the room.  She acknowledged consent and understanding of privacy and security of the platform. The patient has agreed to participate and understands they can discontinue the visit at any time.    Patient is aware this is a billable service.     Assessment & Plan    64  year old female referred to SOC for pre-surgery optimization & BEST program   Other consult concerns include: BEST   She is scheduled on   Case: [id number] Date/Time: 09/10/24 0800   Procedure: BRACHIOPLASTY (Bilateral: Arm Upper)   Anesthesia type: General   Diagnosis: Encounter for cosmetic surgery [Z41.1]   Pre-op diagnosis: Encounter for cosmetic surgery [Z41.1]   Location: [facility]  / On license of [facility]   Surgeons: Lanre Haywood MD     LAST ANESTHESIA   NO CONCERNS     1. Primary hypertension  NO CONCERNS   UNABLE TO CHECK BP AT HOME   WILL SEE PCP 9.3.24    2. Encounter for cosmetic surgery  -     Ambulatory referral to surgical optimization  SEEN FOR SO   SEEN FOR BEST   WILL SEE PCP 9.3.24  Mets 8   Denies Cp/ denies SOB   Dx: Encounter for cosmetic surgery [Z41.1 (ICD-10-CM)]   At risk for post-op DULCE - NO   At risk for post-op SSI - NO   BEST   Breathing- instructed to exercise lungs prior to surgery via DEEP BREATHING   Eat- discussed increasing protein intake prior to surgery -  CONTINUE PROTEIN SHAKES   Sleep/stress- encouraged 8-10 sleep @ night, stress reduction, avoid sick contacts and handwashing   Train- encouraged to remain active      History of Present Illness   64-year-old female referred to [facility] for presurgery optimization.  She explains she lost significant amount of weight on Wegovy.  She is now electing to have her excess skin removed from her underarms.    I spoke with Yvonne over the phone.  We did not connect via video.  She was offered a live visit in the SOC and declined.  Prefers the telephone.  She is extremely pleasant and a pleasure to care for.    She admits to being in well health today.  Denies any new developments in her health.  METS is 8.  Denies chest pain.  Denies shortness of breath.  She is going to see her family doctor on 9/3/2024.  Await office visit for any further needs.    Lives at home.  She is independent with all ADLs.  She has support from her , family and friends.      Surgical optimization review  DULCE risk low  SSI risk low   SOC anemia-no needs  Work on best protocol  Continue to consume 2 Premier protein shakes per day  Work on healthy breathing      ROS  Denies fevers and denies chills  Denies congestion and denies sore throat  Denies chest pain  Denies palpitations  Denies shortness of breath  Denies abdominal pain  Denies nausea, vomiting, and diarrhea  Denies any issues with their skin... example NO open wounds or sores  Denies rashes  Denies difficulty urinating  Denies any issues with their urine... example a dark color or an odor  Denies dizziness  Denies headaches  Denies confusion and denies hallucinations    Admits to being in well health today       Visit Time  Total Visit Duration: 30 minutes         [facility]  CONSULT       Patient has the ability to take their vital signs at home NO  Allergies reviewed today   PMH reviewed today   Medications reviewed today     See Assessment below...  METS:  8.33       As always we discussed having your BEST surgery, and BEST recovery.  Surgery goals reviewed today.      Breathing exercises   Patient was encouraged to begin lung exercises today.  This could be accomplished through deep breathing and cough exercises.    Eating/nutrition   Encouraged patient to increase oral protein intake prior to surgery.  This can be accomplished by consuming chicken, fish, tuna fish, cottage cheese, cheese, eggs, Greek yogurt, and protein shakes as needed.  I encouraged use of protein shakes such ENLIVE.  I also recommended making your own protein shakes with protein powder.     Sleep/Stress management  Patient was encouraged to rest their body prior to surgery.  Encouraged attempting to get 8 hours of sleep at night.  Avoid stress.  Avoid sick contacts.  Encouraged to find a relaxing hobby such as reading, meditation, listening to music.  Training exercises  Patient was encouraged to remain active as possible.  Today bilateral lower extremity generic exercises were taught for muscle strengthening and balance.  All exercises to be done sitting down.

## 2024-09-03 ENCOUNTER — OFFICE VISIT (OUTPATIENT)
Dept: FAMILY MEDICINE CLINIC | Facility: CLINIC | Age: 64
End: 2024-09-03
Payer: COMMERCIAL

## 2024-09-03 VITALS
SYSTOLIC BLOOD PRESSURE: 120 MMHG | RESPIRATION RATE: 14 BRPM | HEIGHT: 65 IN | TEMPERATURE: 97.5 F | BODY MASS INDEX: 27.39 KG/M2 | HEART RATE: 77 BPM | OXYGEN SATURATION: 100 % | DIASTOLIC BLOOD PRESSURE: 70 MMHG | WEIGHT: 164.4 LBS

## 2024-09-03 DIAGNOSIS — Z01.818 PREOPERATIVE CLEARANCE: Primary | ICD-10-CM

## 2024-09-03 DIAGNOSIS — K55.029 ISCHEMIC NECROSIS OF SMALL BOWEL (HCC): ICD-10-CM

## 2024-09-03 DIAGNOSIS — L98.7 EXCESSIVE SKIN AND SUBCUTANEOUS TISSUE: Primary | ICD-10-CM

## 2024-09-03 DIAGNOSIS — I10 HYPERTENSION, UNSPECIFIED TYPE: ICD-10-CM

## 2024-09-03 DIAGNOSIS — E66.01 CLASS 2 SEVERE OBESITY DUE TO EXCESS CALORIES WITH SERIOUS COMORBIDITY AND BODY MASS INDEX (BMI) OF 38.0 TO 38.9 IN ADULT (HCC): ICD-10-CM

## 2024-09-03 PROCEDURE — 99214 OFFICE O/P EST MOD 30 MIN: CPT | Performed by: NURSE PRACTITIONER

## 2024-09-03 NOTE — PROGRESS NOTES
Presurgical Evaluation    Subjective:      Patient ID: Yvonne Hirsch is a 64 y.o. female.    Chief Complaint   Patient presents with    Pre-op Clearance       Patient is here for preop clearance for surgery next Tuesday        The following portions of the patient's history were reviewed and updated as appropriate: allergies, current medications, past family history, past medical history, past social history, past surgical history, and problem list.    Procedure date: 09/10/24    Surgeon:  Yobany  Planned procedure:  BRACHIOPLASTY (Bilateral: Arm Upper)   Diagnosis for procedure:  Excessive skin post bariatric surgery and weight    Prior anesthesia: Yes   General; Complications:  None / Tolerated well    CAD History: None   NOTE: Patient should see Cardiology if time available before surgery, and if appropriate.    Pulmonary History: None    Renal history: None    Diabetes History:  None     Neurological History: None     On Immunosuppressant meds/biologics: No      Review of Systems   Constitutional: Negative.    HENT: Negative.     Eyes: Negative.    Respiratory: Negative.     Cardiovascular: Negative.    Gastrointestinal: Negative.    Endocrine: Negative.    Genitourinary: Negative.    Musculoskeletal: Negative.    Skin: Negative.         Excessive skin under both arms   Allergic/Immunologic: Negative.    Neurological: Negative.    Psychiatric/Behavioral: Negative.           Current Outpatient Medications   Medication Sig Dispense Refill    calcium citrate (CALCITRATE) 950 MG tablet Take 1 tablet by mouth daily      cholecalciferol (VITAMIN D3) 25 mcg (1,000 units) tablet       levothyroxine (Euthyrox) 50 mcg tablet Take 1 tablet (50 mcg total) by mouth daily in the early morning 100 tablet 1    metoprolol succinate (TOPROL-XL) 25 mg 24 hr tablet Take 1 tablet (25 mg total) by mouth daily 90 tablet 0    nystatin (MYCOSTATIN) cream Apply topically 2 (two) times a day 30 g 0    Semaglutide-Weight Management  (WEGOVY) 2.4 MG/0.75ML Inject 0.75 mL (2.4 mg total) under the skin once a week for 4 doses 3 mL 2    Multiple Vitamins-Minerals (MULTIVITAMIN WITH MINERALS) tablet Take 1 tablet by mouth daily      Probiotic Product (PROBIOTIC DAILY PO) Take by mouth       No current facility-administered medications for this visit.       Allergies on file:   Patient has no known allergies.    Patient Active Problem List   Diagnosis    Ischemic necrosis of small bowel (HCC)    Internal hernia    Hypertension    Hypothyroidism    Iron deficiency anemia    S/P exploratory laparotomy    Small bowel obstruction (HCC)    Postsurgical malabsorption    Weight gain following gastric bypass surgery    COVID-19 virus infection    Encounter to establish care    Annual physical exam    Class 2 severe obesity due to excess calories with serious comorbidity and body mass index (BMI) of 38.0 to 38.9 in adult (HCC)    Ventral hernia without obstruction or gangrene    Fungal infection    Preoperative clearance        Past Medical History:   Diagnosis Date    Disease of thyroid gland     Eating disorder Birth    Over eating    Hypertension     Obesity     Postgastrectomy malabsorption     Shingles 20    Mild case - resolved       Past Surgical History:   Procedure Laterality Date    BARIATRIC SURGERY  2015     SECTION      GASTRIC BYPASS      MN LAPS ABD PRTM&OMENTUM DX W/WO SPEC BR/WA SPX N/A 2019    Procedure: LAPAROSCOPY DIAGNOSTIC CONVERTED TO OPEN; SMALL BOWEL RESECTION; LYSIS OF ADHESIONS;  Surgeon: John Simon MD;  Location: [facility];  Service: General    SMALL INTESTINE SURGERY  19    TRIGGER FINGER RELEASE Right        Family History   Problem Relation Age of Onset    Hypertension Mother     Thyroid disease Mother     Cancer Mother         Thrombocytopenia    Hearing loss Mother     Glaucoma Mother     Diabetes Mother         Resolved     COPD Father     Coronary artery disease Father     Hypertension  "Father     Mental illness Brother         Schizoeffective Disorder    Schizoaffective Disorder  Brother     Schizophrenia Paternal Uncle     Breast cancer Neg Hx        Social History     Tobacco Use    Smoking status: Former     Current packs/day: 0.00     Average packs/day: 1 pack/day for 15.6 years (15.6 ttl pk-yrs)     Types: Cigarettes     Start date: 1976     Quit date: 1992     Years since quittin.6    Smokeless tobacco: Never   Vaping Use    Vaping status: Never Used   Substance Use Topics    Alcohol use: Yes     Alcohol/week: 2.0 standard drinks of alcohol     Comment: Rarely    Drug use: Never     Comment: socially       Objective:    Vitals:    24 1006   BP: 120/70   Pulse: 77   Resp: 14   Temp: 97.5 °F (36.4 °C)   TempSrc: Temporal   SpO2: 100%   Weight: 74.6 kg (164 lb 6.4 oz)   Height: 5' 5\" (1.651 m)        Physical Exam  Vitals and nursing note reviewed.   Constitutional:       Appearance: Normal appearance. She is well-developed.   HENT:      Head: Normocephalic and atraumatic.      Right Ear: External ear normal.      Left Ear: External ear normal.   Eyes:      Pupils: Pupils are equal, round, and reactive to light.   Cardiovascular:      Rate and Rhythm: Normal rate and regular rhythm.      Pulses: Normal pulses.      Heart sounds: Normal heart sounds.   Pulmonary:      Effort: Pulmonary effort is normal.      Breath sounds: Normal breath sounds.   Abdominal:      General: Bowel sounds are normal.      Palpations: Abdomen is soft.   Musculoskeletal:         General: Normal range of motion.      Cervical back: Normal range of motion.   Skin:     General: Skin is warm and dry.   Neurological:      General: No focal deficit present.      Mental Status: She is alert and oriented to person, place, and time.   Psychiatric:         Mood and Affect: Mood normal.         Behavior: Behavior normal.         Thought Content: Thought content normal.         Judgment: Judgment normal.       "     Preop labs/testing available and reviewed: yes    eGFR   Date Value Ref Range Status   08/15/2024 93 ml/min/1.73sq m Final     WBC   Date Value Ref Range Status   08/15/2024 10.58 (H) 4.31 - 10.16 Thousand/uL Final     Hemoglobin   Date Value Ref Range Status   08/15/2024 13.3 11.5 - 15.4 g/dL Final     Hematocrit   Date Value Ref Range Status   08/15/2024 41.2 34.8 - 46.1 % Final     Platelets   Date Value Ref Range Status   08/15/2024 334 149 - 390 Thousands/uL Final          EKG yes    Echo no    Stress test/cath no    PFT/[place] no    Functional capacity: Mowing lawn, Push mower  5-6 Mets   Pick the highest level patient can comfortably perform   4 mets or greater for surgery    RCRI  High Risk surgery?         1 Point  CAD History:         1 Point   MI; Positive Stress Test; CP due to Mi;  Nitrate Usage to control Angina; Pathologic Q wave on EKG  CHF Active:         1 Point   Pulm Edema; Paroxysmal Nocturnal Dyspnea;  Bibasilar Rales (crackles);S3; CHF on CXR  Cerebrovascular Disease (TIA or CVA):     1 Point  DM on Insulin:        1 Point  Serum Creat >2.0 mg/dl:       1 Point          Total Points: 6     Scorin: Class I, Very Low Risk (0.4%)     1: Class II, Low risk (0.9%)     2: Class III Moderate (6.6%)     3: Class IV High (>11%)      DULCE Risk:  GFR:   eGFR   Date Value Ref Range Status   08/15/2024 93 ml/min/1.73sq m Final         For PCP:  If GFR>60, Hold ACE/ARB/Diuretic on the day of surgery, and NSAIDS 10 days before.    If GFR<45, Consider PRE and POST op Nephrology Consult.    If 46 <GFR> 59 : Has Patient had DULCE in last 6 Months? no   If YES: Preop Nephrology consult   If No:  Post Op Nephrology consult.           Assessment/Plan:    Patient is medically optimized (cleared) for the planned procedure.    Further testing/evaluation is not required.    Postop concerns: no    Problem List Items Addressed This Visit          Surgery/Wound/Pain    Preoperative clearance - Primary     Patient is  "here for preop clearance.  Is having brachioplasty done next Tuesday.  Had EKG and labs done.  Reviewed.  Not on any blood thinners.  Blood pressure is well-controlled.  Patient is optimized for surgery            Other    Class 2 severe obesity due to excess calories with serious comorbidity and body mass index (BMI) of 38.0 to 38.9 in adult ([facility])     Patient is on maintenance dose of Wegovy reports that she has been taking it every other week.  Doing well on the medication.  Continue weight watchers continue Wegovy.  Continue with exercise         Relevant Medications    Semaglutide-Weight Management (WEGOVY) 2.4 MG/0.75ML        Diagnoses and all orders for this visit:    Preoperative clearance    Class 2 severe obesity due to excess calories with serious comorbidity and body mass index (BMI) of 38.0 to 38.9 in adult ([facility])  -     Semaglutide-Weight Management (WEGOVY) 2.4 MG/0.75ML; Inject 0.75 mL (2.4 mg total) under the skin once a week for 4 doses          Pre-Surgery Instructions:   Medication Instructions    calcium citrate (CALCITRATE) 950 MG tablet per anesthesia guidelines     cholecalciferol (VITAMIN D3) 25 mcg (1,000 units) tablet per anesthesia guidelines     levothyroxine (Euthyrox) 50 mcg tablet per anesthesia guidelines     metoprolol succinate (TOPROL-XL) 25 mg 24 hr tablet per anesthesia guidelines     nystatin (MYCOSTATIN) cream per anesthesia guidelines     Semaglutide-Weight Management (WEGOVY) 2.4 MG/0.75ML per anesthesia guidelines         NOTE: Please use the above to review important meds for your specialty, the remainder \"per anesthesia Guidelines.\"    NOTE: Please place an Inbasket message for \"SOC\" pool for complicated patients.    "

## 2024-09-03 NOTE — ASSESSMENT & PLAN NOTE
Patient is here for preop clearance.  Is having brachioplasty done next Tuesday.  Had EKG and labs done.  Reviewed.  Not on any blood thinners.  Blood pressure is well-controlled.  Patient is optimized for surgery

## 2024-09-03 NOTE — ASSESSMENT & PLAN NOTE
Patient is on maintenance dose of Wegovy reports that she has been taking it every other week.  Doing well on the medication.  Continue weight watchers continue Wegovy.  Continue with exercise

## 2024-09-04 ENCOUNTER — TELEPHONE (OUTPATIENT)
Age: 64
End: 2024-09-04

## 2024-09-04 NOTE — TELEPHONE ENCOUNTER
Patient would like a call from Radha, she has a few questions about her upcoming surgery. Please advise and call pt back, thank you!   independent

## 2024-09-05 ENCOUNTER — OFFICE VISIT (OUTPATIENT)
Dept: CARDIOLOGY CLINIC | Facility: CLINIC | Age: 64
End: 2024-09-05
Payer: COMMERCIAL

## 2024-09-05 VITALS
SYSTOLIC BLOOD PRESSURE: 152 MMHG | OXYGEN SATURATION: 98 % | HEIGHT: 65 IN | HEART RATE: 73 BPM | TEMPERATURE: 97.9 F | WEIGHT: 164.4 LBS | BODY MASS INDEX: 27.39 KG/M2 | DIASTOLIC BLOOD PRESSURE: 92 MMHG

## 2024-09-05 DIAGNOSIS — L98.7 EXCESSIVE SKIN AND SUBCUTANEOUS TISSUE: ICD-10-CM

## 2024-09-05 DIAGNOSIS — Z90.49 S/P SMALL BOWEL RESECTION: ICD-10-CM

## 2024-09-05 DIAGNOSIS — Z01.810 PREOPERATIVE CARDIOVASCULAR EXAMINATION: Primary | ICD-10-CM

## 2024-09-05 DIAGNOSIS — I10 PRIMARY HYPERTENSION: ICD-10-CM

## 2024-09-05 DIAGNOSIS — E66.3 OVERWEIGHT (BMI 25.0-29.9): ICD-10-CM

## 2024-09-05 DIAGNOSIS — E03.9 HYPOTHYROIDISM, UNSPECIFIED TYPE: ICD-10-CM

## 2024-09-05 DIAGNOSIS — Z98.84 S/P GASTRIC BYPASS: ICD-10-CM

## 2024-09-05 PROCEDURE — 99243 OFF/OP CNSLTJ NEW/EST LOW 30: CPT | Performed by: INTERNAL MEDICINE

## 2024-09-05 NOTE — PROGRESS NOTES
[facility] [place] [address] [facility] [id number] [phone]                                            Cardiology Office Consult  Yvonne Hirsch, 64 y.o. female  YOB: 1960  MRN: [id number] Encounter: [id number]      PCP - FINESSE Martínez  Referring Provider - Lanre Haywood MD    No chief complaint on file.      Assessment  Preoperative cardiovascular examination  Hypertension  Hypothyroidism  SBO S/p bowel resection & lysis of adhesions (2019)  S/p Laparscopic gastric bypass (2015)  335 lbs --> lowest 174 lbs --> went up to 262 lbs (8/2022) --> started weight watchers and then Wegovy --> now down to 164 lbs  Overweight, Body mass index is 27.36 kg/m².      Plan  Pre-operative cardiovascular evaluation  Planned surgery: BRACHIOPLASTY (Bilateral: Arm Upper) (, General anesthesia)  Active cardiac complaints: None  Cardiac co-morbidities: None  Functional status: Active, works in surgical optimization. Able to walk up 2 flights of stairs without symptoms  No known h/o CAD, heart failure  Vitals - reviewed and stable  ECG - 8/15/24 personally reviewed and interpreted today - NSR, normal ECG  Okay to proceed with planned surgery as low-moderate cardiac risk   she is medically as optimized as able from the cardiac standpoint    Hypertension, Overweight - Body mass index is 27.36 kg/m².   Blood pressure mildly elevated at 152/92  Blood pressure recent Jatinder has been well-controlled in the 120s to 140s at other office visits  She is on Wegovy and continues to lose weight, and as result her blood pressure is likely to improve further  Continue to monitor, low-salt diet  Follow-up with PCP      No results found for this visit on 09/05/24.    No orders of the defined types were placed in this encounter.    Return if symptoms worsen or fail to improve.      History of Present Illness   64 y.o. 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    She reports active complaints of chest pain, shortness of breath, palpitations or dizziness.  No known prior history of coronary artery disease or heart failure.  No known major heart related hospitalizations.  She remains active and working, and is able to go up 2 flights of stairs without any chest pain symptoms.  She has previously undergone 2 surgeries including gastric bypass in  and bowel resection and lysis of adhesions in , and reports not having had any perioperative cardiac problems around these.    Family history  Father -  at 78 of COPD, HTN, no known heart surgeries  Mother - alive at 85, HTN, hypothyroidism, no known heart problems  Siblings: 2 paternal half-siblings  No known heart problems  Brother - schizoaffective disorder    Historical Information   Past Medical History:   Diagnosis Date   • Disease of thyroid gland    • Eating disorder Birth    Over eating   • Hypertension    • Obesity    • Postgastrectomy malabsorption    • Shingles 20    Mild case - resolved     Past Surgical History:   Procedure Laterality Date   • BARIATRIC SURGERY  2015   •  SECTION     • GASTRIC BYPASS     • [place] LAPS ABD PRTM&OMENTUM DX W/WO SPEC BR/WA SPX N/A 2019    Procedure: LAPAROSCOPY DIAGNOSTIC CONVERTED TO OPEN; SMALL BOWEL RESECTION; LYSIS OF ADHESIONS;  Surgeon: John Simon MD;  Location: [facility];  Service: General   • SMALL INTESTINE SURGERY  19   • TRIGGER FINGER RELEASE Right      Family History   Problem Relation Age of Onset   • Hypertension Mother    • Thyroid disease Mother    • Cancer Mother         Thrombocytopenia   • Hearing loss Mother    • Glaucoma Mother    • Diabetes Mother         Resolved    • COPD Father    • Coronary artery disease Father    • Hypertension Father    • Mental illness Brother         Schizoeffective  Disorder   • Schizoaffective Disorder  Brother    • Schizophrenia Paternal Uncle    • Breast cancer Neg Hx      Current Outpatient Medications   Medication Instructions   • calcium citrate (CALCITRATE) 950 MG tablet 1 tablet, Oral, Daily   • cholecalciferol (VITAMIN D3) 25 mcg (1,000 units) tablet No dose, route, or frequency recorded.   • levothyroxine (EUTHYROX) 50 mcg, Oral, Daily (early morning)   • metoprolol succinate (TOPROL-XL) 25 mg, Oral, Daily   • Multiple Vitamins-Minerals (MULTIVITAMIN WITH MINERALS) tablet 1 tablet, Oral, Daily   • nystatin (MYCOSTATIN) cream Topical, 2 times daily   • Probiotic Product (PROBIOTIC DAILY PO) Oral   • Semaglutide-Weight Management (WEGOVY) 2.4 mg, Subcutaneous, Weekly      No Known Allergies  Social History     Socioeconomic History   • Marital status: /Civil Union     Spouse name: None   • Number of children: None   • Years of education: None   • Highest education level: None   Occupational History   • None   Tobacco Use   • Smoking status: Former     Current packs/day: 0.00     Average packs/day: 1 pack/day for 15.6 years (15.6 ttl pk-yrs)     Types: Cigarettes     Start date: 1976     Quit date: 1992     Years since quittin.7   • Smokeless tobacco: Never   Vaping Use   • Vaping status: Never Used   Substance and Sexual Activity   • Alcohol use: Yes     Alcohol/week: 2.0 standard drinks of alcohol     Comment: Rarely   • Drug use: Never     Comment: socially   • Sexual activity: Yes     Partners: Male     Birth control/protection: Post-menopausal   Other Topics Concern   • None   Social History Narrative   • None     Social Determinants of Health     Financial Resource Strain: Not on file   Food Insecurity: Not on file   Transportation Needs: Not on file   Physical Activity: Not on file   Stress: Not on file   Social Connections: Not on file   Intimate Partner Violence: Not on file   Housing Stability: Not on file        Review of Systems   All  "other systems reviewed and are negative.        Vitals:  Vitals:    09/05/24 0954   BP: 152/92   BP Location: Left arm   Patient Position: Sitting   Cuff Size: Adult   Pulse: 73   Temp: 97.9 °F (36.6 °C)   TempSrc: Tympanic   SpO2: 98%   Weight: 74.6 kg (164 lb 6.4 oz)   Height: 5' 5\" (1.651 m)     BMI - Body mass index is 27.36 kg/m².  Wt Readings from Last 7 Encounters:   09/05/24 74.6 kg (164 lb 6.4 oz)   09/03/24 74.6 kg (164 lb 6.4 oz)   07/16/24 76.7 kg (169 lb)   04/18/24 81.2 kg (179 lb)   04/16/24 81.4 kg (179 lb 6.4 oz)   11/17/23 88 kg (194 lb)   07/28/23 96.2 kg (212 lb)       Physical Exam  Vitals and nursing note reviewed.   Constitutional:       General: She is not in acute distress.     Appearance: Normal appearance. She is well-developed. She is obese. She is not ill-appearing.   HENT:      Head: Normocephalic and atraumatic.      Nose: No congestion.   Eyes:      General: No scleral icterus.     Conjunctiva/sclera: Conjunctivae normal.   Neck:      Vascular: No carotid bruit or JVD.   Cardiovascular:      Rate and Rhythm: Normal rate and regular rhythm.      Pulses: Normal pulses.      Heart sounds: Normal heart sounds. No murmur heard.     No friction rub. No gallop.   Pulmonary:      Effort: Pulmonary effort is normal. No respiratory distress.      Breath sounds: Normal breath sounds. No rales.   Abdominal:      General: There is no distension.      Palpations: Abdomen is soft.      Tenderness: There is no abdominal tenderness.   Musculoskeletal:         General: No swelling or tenderness.      Cervical back: Neck supple.      Right lower leg: No edema.      Left lower leg: No edema.   Skin:     General: Skin is warm.   Neurological:      General: No focal deficit present.      Mental Status: She is alert and oriented to person, place, and time. Mental status is at baseline.   Psychiatric:         Mood and Affect: Mood normal.         Behavior: Behavior normal.         Thought Content: Thought " "content normal.           Labs:  CBC:   Lab Results   Component Value Date    WBC 10.58 (H) 08/15/2024    RBC 4.54 08/15/2024    HGB 13.3 08/15/2024    HCT 41.2 08/15/2024    MCV 91 08/15/2024     08/15/2024    RDW 12.6 08/15/2024       CMP:   Lab Results   Component Value Date     10/05/2015    K 3.9 08/15/2024     08/15/2024    CO2 30 08/15/2024    ANIONGAP 9 10/05/2015    BUN 24 08/15/2024    CREATININE 0.67 08/15/2024    EGFR 93 08/15/2024    GLUCOSE 84 10/05/2015    CALCIUM 9.6 08/15/2024    AST 19 04/13/2024    ALT 19 04/13/2024    ALKPHOS 73 04/13/2024    PROT 7.4 10/05/2015    BILITOT 0.34 10/05/2015       Magnesium:  Lab Results   Component Value Date    MG 1.9 10/23/2019       Lipid Profile:   Lab Results   Component Value Date    CHOL 118 10/05/2015    HDL 42 (L) 04/13/2024    TRIG 139 04/13/2024    LDLCALC 96 04/13/2024       Thyroid Studies:   Lab Results   Component Value Date    RBW3JYMFASFF 3.416 05/22/2024    FREET4 1.00 03/08/2023       A1c:  No components found for: \"HGA1C\"    INR:  Lab Results   Component Value Date    INR 1.18 (H) 01/21/2019    INR 1.01 01/20/2019   5    Cardiac testing:   No results found for this or any previous visit.    No results found for this or any previous visit.    No results found for this or any previous visit.    No results found for this or any previous visit.      XR chest pa & lateral  Narrative: CHEST    INDICATION:   Encounter for cosmetic surgery.     COMPARISON:  None.    EXAM PERFORMED/VIEWS:  XR CHEST PA & LATERAL    FINDINGS:    Cardiomediastinal silhouette appears unremarkable.    The lungs are clear.  No pneumothorax or pleural effusion. Small eventration anterior right hemidiaphragm.    Osseous structures appear within normal limits for patient age. Scoliosis to the right in the lower thorax  Impression: No acute cardiopulmonary disease.  No significant change from 10/23/2019.  Eventration anterior right hemidiaphragm, small in " size.    Electronically signed: 08/15/2024 04:17 PM Jl Rodney MD

## 2024-09-06 ENCOUNTER — OFFICE VISIT (OUTPATIENT)
Age: 64
End: 2024-09-06

## 2024-09-06 VITALS
DIASTOLIC BLOOD PRESSURE: 80 MMHG | HEART RATE: 73 BPM | WEIGHT: 166.2 LBS | SYSTOLIC BLOOD PRESSURE: 120 MMHG | TEMPERATURE: 98 F | OXYGEN SATURATION: 98 % | BODY MASS INDEX: 27.69 KG/M2 | HEIGHT: 65 IN

## 2024-09-06 DIAGNOSIS — Z41.1 ENCOUNTER FOR COSMETIC SURGERY: ICD-10-CM

## 2024-09-06 DIAGNOSIS — L98.7 EXCESSIVE AND REDUNDANT SKIN AND SUBCUTANEOUS TISSUE: ICD-10-CM

## 2024-09-06 DIAGNOSIS — Z01.818 PREOP EXAMINATION: Primary | ICD-10-CM

## 2024-09-06 PROCEDURE — PREOP: Performed by: PHYSICIAN ASSISTANT

## 2024-09-06 NOTE — PROGRESS NOTES
Subjective   Patient ID: Yvonne Hirsch is a 64 y.o. female.    Vitals:    09/06/24 1426   BP: 120/80   Pulse: 73   Temp: 98 °F (36.7 °C)   SpO2: 98%     HPI Patient is a 64-year-old female with a history of massive weight loss including Dontae-en-Y gastric bypass, maintenance with medications, who is here today for preoperative history and physical for bilateral brachioplasty on 9/10/2024.  The patient lost approximately 93 lbs overall and now has significant loose hanging skin and isn't happy with her arm shape and contour.  The patient complains of rashing, chronic irritation in the folds of her skin.  She has redness in the area that is particularly worse with increased activity and limits her overall daily function.  The patient has tried multiple medications and medical treatment has failed to adequately resolve the issue. She is a non smoker, she does not take steroids or blood thinners, she does not have a history of DVT.  She is up to date with her mammograms.  She denies medication allergies.  Patient does take Wegovy.    The following portions of the patient's history were reviewed and updated as appropriate: allergies, current medications, past family history, past medical history, past social history, past surgical history, and problem list.    Review of Systems   Skin:  Positive for rash.     Objective   Physical Exam   Vitals reviewed.  Constitutional  She appears well-developed and well-nourished.     Eyes  Pupils are equal, round, and reactive to light. System normal.     General -             Right: Right eye extraocular movements are normal.             Left: Left eye extraocular movements are normal.     Cardiovascular: Normal rate.     Pulmonary/Chest  Effort normal.     Skin  Skin is warm and dry.     Psychiatric  She has a normal mood and affect. Her behavior is normal. Judgment and thought content normal.   Bilateral arms--significant excess of bilateral arms in the vertical and horizontal  direction, she is chronic inflammation, acute inflammation and excoriation under her arms but no acute cellulitis.    Past Medical History:   Diagnosis Date    Disease of thyroid gland     Eating disorder Birth    Over eating    Hypertension     Obesity     Postgastrectomy malabsorption     Shingles 20    Mild case - resolved     Patient Active Problem List   Diagnosis    Ischemic necrosis of small bowel (HCC)    Internal hernia    Hypertension    Hypothyroidism    Iron deficiency anemia    S/P exploratory laparotomy    Small bowel obstruction (HCC)    Postsurgical malabsorption    Weight gain following gastric bypass surgery    COVID-19 virus infection    Encounter to establish care    Annual physical exam    Class 2 severe obesity due to excess calories with serious comorbidity and body mass index (BMI) of 38.0 to 38.9 in adult (HCC)    Ventral hernia without obstruction or gangrene    Fungal infection    Preoperative clearance     Past Surgical History:   Procedure Laterality Date    BARIATRIC SURGERY  2015     SECTION      GASTRIC BYPASS      ND LAPS ABD PRTM&OMENTUM DX W/WO SPEC BR/WA SPX N/A 2019    Procedure: LAPAROSCOPY DIAGNOSTIC CONVERTED TO OPEN; SMALL BOWEL RESECTION; LYSIS OF ADHESIONS;  Surgeon: John Simon MD;  Location: [facility];  Service: General    SMALL INTESTINE SURGERY  19    TRIGGER FINGER RELEASE Right      Current Outpatient Medications   Medication Instructions    calcium citrate (CALCITRATE) 950 MG tablet 1 tablet, Oral, Daily    cholecalciferol (VITAMIN D3) 25 mcg (1,000 units) tablet No dose, route, or frequency recorded.    levothyroxine (EUTHYROX) 50 mcg, Oral, Daily (early morning)    metoprolol succinate (TOPROL-XL) 25 mg, Oral, Daily    Multiple Vitamins-Minerals (MULTIVITAMIN WITH MINERALS) tablet 1 tablet, Oral, Daily    nystatin (MYCOSTATIN) cream Topical, 2 times daily    Probiotic Product (PROBIOTIC DAILY PO) Oral    Semaglutide-Weight Management  (WEGOVY) 2.4 mg, Subcutaneous, Weekly     Social History     Social History Narrative    Not on file     Social History     Tobacco Use   Smoking Status Former    Current packs/day: 0.00    Average packs/day: 1 pack/day for 15.6 years (15.6 ttl pk-yrs)    Types: Cigarettes    Start date: 1976    Quit date: 1992    Years since quittin.7   Smokeless Tobacco Never       Assessment & Plan 64-year-old female with massive weight loss and excess skin of the arms for bilateral brachioplasty    Medical and cardiac clearances reviewed.  Mammogram reviewed.  Hold Wegovy    Today I discussed the surgery in detail with the patient regarding the expected length of surgery, the expected hospital stay, and the expected recovery time. We discussed the potential risks and complications such as bleeding, infection, scarring, hematoma, seroma, asymmetry, contour deformity, skin necrosis, axillary contracture, damage to surrounding structures, wound healing complications, poor cosmetic result, and need for additional procedures.  The use of drains was discussed. Postoperative activity restrictions and medications were discussed. The patient understands and agrees to the plan of care and does not have any additional questions at this time. Informed consent was obtained today for the upcoming surgery.        Pauly Longoria PA-C  Plastic & Reconstructive Surgery

## 2024-09-09 NOTE — DISCHARGE INSTR - AVS FIRST PAGE
Brachioplasty Discharge Instructions:     Your incisions are covered with ointment and gauze dressings, and wrapped with Kerlix and ACE bandages. Do not remove these dressings. Reinforce as needed.     2.         Milk the drain tubing using an alcohol wipe and empty the bulbs every 8 hours or as necessary as shown (uncap, drain fluid, record amount, discard, squeeze air out, recap). Keep track of total output from each drain per every 24 hours.   The drains will typically stay in 1-2 weeks post-operatively.  NO showering with drains in place.           3. Keep arms wrapped at all times, do not remove dressings. Do not get dressings wet. Elevate your arms on pillows for comfort and to help reduce swelling. Keep your arms above your heart.     4. No ice or heat to surgical sites.     5. No pushing, pulling, or lifting more than 10lbs, repetitive arm motions, or strenuous activity for 4-6 weeks post-operatively.         All Surgeries     You must be off all pain medications and have a clear field of vision before driving  For the next 24 hours:  Do not sign any legal documents or operate machinery.  Have a responsible adult help you.  Take it easy & rest.  Clear liquids first, if no nausea, progress to soft diet, and then regular diet as tolerated.  Take medications as ordered, and do not take any pain medication on an empty stomach. Once pain meds are finished you may alternate Tylenol & Advil as directed for pain  Make sure to take all antibiotics until completion  If lovenox or heparin was prescribed, use as directed until completion  Avoid alcoholic beverages.  Resume any prior medications at home unless otherwise instructed by Dr. Haywood.  Call Dr. Haywood at [phone] -office,  Obvious bleeding, excessive swelling, and tenderness  Hardness of face on palpation  Swelling & hardness at eyelids.  Fever over 101.0°   Shortness of breath, severe calf or thigh pain.  Redness, odor, or pus at the wound {some  oozing is normal from incision sites and may continue for several days ABD pads or feminine pads can be used for absorption )  Persistent vomiting or pain that is not relieved by your medication.  To make your follow-up appointment , ask a question, or report a problem

## 2024-09-09 NOTE — TELEPHONE ENCOUNTER
Rec'd call from patient requesting to r/s her surgery for tomorrow with Dr. Haywood. A family emergency came up and she is unable to make the surgery tomorrow.     Please contact patient at your earliest convenience. Thank you in advance!

## 2024-09-09 NOTE — H&P
Subjective  Patient ID: Yvonne Hirsch is a 64 y.o. female.    Vitals:    11/08/24 0659   BP: 154/88   Pulse: 94   Resp: 18   Temp: (!) 97.4 °F (36.3 °C)   SpO2: 100%        HPI Patient is a 64-year-old female with a history of massive weight loss including Dontae-en-Y gastric bypass, maintenance with medications, who is here today for preoperative history and physical for bilateral brachioplasty on 9/10/2024.  The patient lost approximately 93 lbs overall and now has significant loose hanging skin and isn't happy with her arm shape and contour.  The patient complains of rashing, chronic irritation in the folds of her skin.  She has redness in the area that is particularly worse with increased activity and limits her overall daily function.  The patient has tried multiple medications and medical treatment has failed to adequately resolve the issue. She is a non smoker, she does not take steroids or blood thinners, she does not have a history of DVT.  She is up to date with her mammograms.  She denies medication allergies.  Patient does take Wegovy.     The following portions of the patient's history were reviewed and updated as appropriate: allergies, current medications, past family history, past medical history, past social history, past surgical history, and problem list.     Review of Systems   Skin:  Positive for rash.            Objective  Physical Exam   Vitals reviewed.  Constitutional  She appears well-developed and well-nourished.      Eyes  Pupils are equal, round, and reactive to light. System normal.      General -             Right: Right eye extraocular movements are normal.             Left: Left eye extraocular movements are normal.      Cardiovascular: Normal rate.      Pulmonary/Chest  Effort normal.      Skin  Skin is warm and dry.      Psychiatric  She has a normal mood and affect. Her behavior is normal. Judgment and thought content normal.   Bilateral arms--significant excess of bilateral arms in  the vertical and horizontal direction, she is chronic inflammation, acute inflammation and excoriation under her arms but no acute cellulitis.     Medical History        Past Medical History:   Diagnosis Date    Disease of thyroid gland      Eating disorder Birth     Over eating    Hypertension      Obesity      Postgastrectomy malabsorption      Shingles 20     Mild case - resolved         Problem List       Patient Active Problem List   Diagnosis    Ischemic necrosis of small bowel (HCC)    Internal hernia    Hypertension    Hypothyroidism    Iron deficiency anemia    S/P exploratory laparotomy    Small bowel obstruction (HCC)    Postsurgical malabsorption    Weight gain following gastric bypass surgery    COVID-19 virus infection    Encounter to establish care    Annual physical exam    Class 2 severe obesity due to excess calories with serious comorbidity and body mass index (BMI) of 38.0 to 38.9 in adult (HCC)    Ventral hernia without obstruction or gangrene    Fungal infection    Preoperative clearance         Surgical History         Past Surgical History:   Procedure Laterality Date    BARIATRIC SURGERY   2015     SECTION        GASTRIC BYPASS        [place] LAPS ABD PRTM&OMENTUM DX W/WO SPEC BR/WA SPX N/A 2019     Procedure: LAPAROSCOPY DIAGNOSTIC CONVERTED TO OPEN; SMALL BOWEL RESECTION; LYSIS OF ADHESIONS;  Surgeon: John Simon MD;  Location: [facility];  Service: General    SMALL INTESTINE SURGERY   19    TRIGGER FINGER RELEASE Right                Current Outpatient Medications   Medication Instructions    calcium citrate (CALCITRATE) 950 MG tablet 1 tablet, Oral, Daily    cholecalciferol (VITAMIN D3) 25 mcg (1,000 units) tablet No dose, route, or frequency recorded.    levothyroxine (EUTHYROX) 50 mcg, Oral, Daily (early morning)    metoprolol succinate (TOPROL-XL) 25 mg, Oral, Daily    Multiple Vitamins-Minerals (MULTIVITAMIN WITH MINERALS) tablet 1 tablet, Oral, Daily     nystatin (MYCOSTATIN) cream Topical, 2 times daily    Probiotic Product (PROBIOTIC DAILY PO) Oral    Semaglutide-Weight Management (WEGOVY) 2.4 mg, Subcutaneous, Weekly      Social History          Social History Narrative    Not on file      Tobacco Use History   Social History           Tobacco Use   Smoking Status Former    Current packs/day: 0.00    Average packs/day: 1 pack/day for 15.6 years (15.6 ttl pk-yrs)    Types: Cigarettes    Start date: 1976    Quit date: 1992    Years since quittin.7   Smokeless Tobacco Never            Assessment & Plan 64-year-old female with massive weight loss and excess skin of the arms for bilateral brachioplasty     Medical and cardiac clearances reviewed.  Mammogram reviewed.  Hold Wegovy     Today I discussed the surgery in detail with the patient regarding the expected length of surgery, the expected hospital stay, and the expected recovery time. We discussed the potential risks and complications such as bleeding, infection, scarring, hematoma, seroma, asymmetry, contour deformity, skin necrosis, axillary contracture, damage to surrounding structures, wound healing complications, poor cosmetic result, and need for additional procedures.  The use of drains was discussed. Postoperative activity restrictions and medications were discussed. The patient understands and agrees to the plan of care and does not have any additional questions at this time. Informed consent was obtained today for the upcoming surgery.        Pauly Longoria PA-C  Plastic & Reconstructive Surgery

## 2024-09-10 ENCOUNTER — ANESTHESIA (OUTPATIENT)
Dept: PERIOP | Facility: HOSPITAL | Age: 64
End: 2024-09-10
Payer: COMMERCIAL

## 2024-09-30 ENCOUNTER — TELEPHONE (OUTPATIENT)
Dept: PLASTIC SURGERY | Facility: CLINIC | Age: 64
End: 2024-09-30

## 2024-09-30 DIAGNOSIS — Z41.1 ENCOUNTER FOR COSMETIC SURGERY: Primary | ICD-10-CM

## 2024-09-30 DIAGNOSIS — Z01.818 PREOP EXAMINATION: ICD-10-CM

## 2024-10-11 ENCOUNTER — APPOINTMENT (OUTPATIENT)
Dept: LAB | Facility: MEDICAL CENTER | Age: 64
End: 2024-10-11
Payer: COMMERCIAL

## 2024-10-11 DIAGNOSIS — Z01.818 PREOP EXAMINATION: ICD-10-CM

## 2024-10-11 DIAGNOSIS — Z41.1 ENCOUNTER FOR COSMETIC SURGERY: ICD-10-CM

## 2024-10-11 LAB
ANION GAP SERPL CALCULATED.3IONS-SCNC: 8 MMOL/L (ref 4–13)
BASOPHILS # BLD AUTO: 0.08 THOUSANDS/ΜL (ref 0–0.1)
BASOPHILS NFR BLD AUTO: 1 % (ref 0–1)
BUN SERPL-MCNC: 25 MG/DL (ref 5–25)
CALCIUM SERPL-MCNC: 9.6 MG/DL (ref 8.4–10.2)
CHLORIDE SERPL-SCNC: 103 MMOL/L (ref 96–108)
CO2 SERPL-SCNC: 29 MMOL/L (ref 21–32)
CREAT SERPL-MCNC: 0.8 MG/DL (ref 0.6–1.3)
EOSINOPHIL # BLD AUTO: 0.09 THOUSAND/ΜL (ref 0–0.61)
EOSINOPHIL NFR BLD AUTO: 1 % (ref 0–6)
ERYTHROCYTE [DISTWIDTH] IN BLOOD BY AUTOMATED COUNT: 12.6 % (ref 11.6–15.1)
GFR SERPL CREATININE-BSD FRML MDRD: 78 ML/MIN/1.73SQ M
GLUCOSE SERPL-MCNC: 85 MG/DL (ref 65–140)
HCT VFR BLD AUTO: 41.5 % (ref 34.8–46.1)
HGB BLD-MCNC: 13.4 G/DL (ref 11.5–15.4)
IMM GRANULOCYTES # BLD AUTO: 0.04 THOUSAND/UL (ref 0–0.2)
IMM GRANULOCYTES NFR BLD AUTO: 0 % (ref 0–2)
LYMPHOCYTES # BLD AUTO: 3.52 THOUSANDS/ΜL (ref 0.6–4.47)
LYMPHOCYTES NFR BLD AUTO: 33 % (ref 14–44)
MCH RBC QN AUTO: 30.5 PG (ref 26.8–34.3)
MCHC RBC AUTO-ENTMCNC: 32.3 G/DL (ref 31.4–37.4)
MCV RBC AUTO: 95 FL (ref 82–98)
MONOCYTES # BLD AUTO: 0.57 THOUSAND/ΜL (ref 0.17–1.22)
MONOCYTES NFR BLD AUTO: 5 % (ref 4–12)
NEUTROPHILS # BLD AUTO: 6.45 THOUSANDS/ΜL (ref 1.85–7.62)
NEUTS SEG NFR BLD AUTO: 60 % (ref 43–75)
NRBC BLD AUTO-RTO: 0 /100 WBCS
PLATELET # BLD AUTO: 283 THOUSANDS/UL (ref 149–390)
PMV BLD AUTO: 11.5 FL (ref 8.9–12.7)
POTASSIUM SERPL-SCNC: 3.9 MMOL/L (ref 3.5–5.3)
RBC # BLD AUTO: 4.39 MILLION/UL (ref 3.81–5.12)
SODIUM SERPL-SCNC: 140 MMOL/L (ref 135–147)
WBC # BLD AUTO: 10.75 THOUSAND/UL (ref 4.31–10.16)

## 2024-10-11 PROCEDURE — 85025 COMPLETE CBC W/AUTO DIFF WBC: CPT

## 2024-10-11 PROCEDURE — 80048 BASIC METABOLIC PNL TOTAL CA: CPT

## 2024-10-11 PROCEDURE — 36415 COLL VENOUS BLD VENIPUNCTURE: CPT

## 2024-10-12 DIAGNOSIS — E03.9 HYPOTHYROIDISM, UNSPECIFIED TYPE: ICD-10-CM

## 2024-10-12 DIAGNOSIS — I10 PRIMARY HYPERTENSION: ICD-10-CM

## 2024-10-14 RX ORDER — METOPROLOL SUCCINATE 25 MG/1
25 TABLET, EXTENDED RELEASE ORAL DAILY
Qty: 90 TABLET | Refills: 1 | Status: SHIPPED | OUTPATIENT
Start: 2024-10-14

## 2024-10-14 RX ORDER — LEVOTHYROXINE SODIUM 50 UG/1
50 TABLET ORAL
Qty: 100 TABLET | Refills: 1 | Status: SHIPPED | OUTPATIENT
Start: 2024-10-14

## 2024-11-08 ENCOUNTER — HOSPITAL ENCOUNTER (OUTPATIENT)
Facility: HOSPITAL | Age: 64
Setting detail: OUTPATIENT SURGERY
Discharge: HOME | End: 2024-11-08
Attending: PLASTIC SURGERY | Admitting: PLASTIC SURGERY
Payer: COMMERCIAL

## 2024-11-08 VITALS
WEIGHT: 161.6 LBS | DIASTOLIC BLOOD PRESSURE: 82 MMHG | TEMPERATURE: 97.2 F | RESPIRATION RATE: 18 BRPM | HEIGHT: 65 IN | OXYGEN SATURATION: 100 % | BODY MASS INDEX: 26.92 KG/M2 | HEART RATE: 72 BPM | SYSTOLIC BLOOD PRESSURE: 173 MMHG

## 2024-11-08 DIAGNOSIS — Z41.1 ENCOUNTER FOR COSMETIC SURGERY: ICD-10-CM

## 2024-11-08 PROCEDURE — 15836 EXC EXCESSIVE SKIN ARM: CPT | Performed by: PLASTIC SURGERY

## 2024-11-08 PROCEDURE — NC001 PR NO CHARGE: Performed by: PLASTIC SURGERY

## 2024-11-08 PROCEDURE — C9290 INJ, BUPIVACAINE LIPOSOME: HCPCS | Performed by: PHYSICIAN ASSISTANT

## 2024-11-08 PROCEDURE — 88302 TISSUE EXAM BY PATHOLOGIST: CPT | Performed by: PATHOLOGY

## 2024-11-08 RX ORDER — ENOXAPARIN SODIUM 100 MG/ML
40 INJECTION SUBCUTANEOUS ONCE
Status: COMPLETED | OUTPATIENT
Start: 2024-11-08 | End: 2024-11-08

## 2024-11-08 RX ORDER — TRANEXAMIC ACID 100 MG/ML
INJECTION, SOLUTION INTRAVENOUS AS NEEDED
Status: DISCONTINUED | OUTPATIENT
Start: 2024-11-08 | End: 2024-11-08

## 2024-11-08 RX ORDER — ROCURONIUM BROMIDE 10 MG/ML
INJECTION, SOLUTION INTRAVENOUS AS NEEDED
Status: DISCONTINUED | OUTPATIENT
Start: 2024-11-08 | End: 2024-11-08

## 2024-11-08 RX ORDER — GINSENG 100 MG
CAPSULE ORAL AS NEEDED
Status: DISCONTINUED | OUTPATIENT
Start: 2024-11-08 | End: 2024-11-08 | Stop reason: HOSPADM

## 2024-11-08 RX ORDER — GABAPENTIN 300 MG/1
300 CAPSULE ORAL ONCE
Status: COMPLETED | OUTPATIENT
Start: 2024-11-08 | End: 2024-11-08

## 2024-11-08 RX ORDER — CEFAZOLIN SODIUM 1 G/50ML
1000 SOLUTION INTRAVENOUS ONCE
Status: COMPLETED | OUTPATIENT
Start: 2024-11-08 | End: 2024-11-08

## 2024-11-08 RX ORDER — OXYCODONE HYDROCHLORIDE 5 MG/1
5 TABLET ORAL EVERY 4 HOURS PRN
Status: DISCONTINUED | OUTPATIENT
Start: 2024-11-08 | End: 2024-11-08 | Stop reason: HOSPADM

## 2024-11-08 RX ORDER — MAGNESIUM HYDROXIDE 1200 MG/15ML
LIQUID ORAL AS NEEDED
Status: DISCONTINUED | OUTPATIENT
Start: 2024-11-08 | End: 2024-11-08 | Stop reason: HOSPADM

## 2024-11-08 RX ORDER — CYCLOBENZAPRINE HCL 10 MG
10 TABLET ORAL 3 TIMES DAILY
Qty: 21 TABLET | Refills: 0 | Status: SHIPPED | OUTPATIENT
Start: 2024-11-08 | End: 2024-11-15

## 2024-11-08 RX ORDER — SODIUM CHLORIDE 9 MG/ML
125 INJECTION, SOLUTION INTRAVENOUS CONTINUOUS
Status: DISCONTINUED | OUTPATIENT
Start: 2024-11-08 | End: 2024-11-08

## 2024-11-08 RX ORDER — GABAPENTIN 300 MG/1
300 CAPSULE ORAL 3 TIMES DAILY
Qty: 21 CAPSULE | Refills: 0 | Status: SHIPPED | OUTPATIENT
Start: 2024-11-08 | End: 2024-11-15

## 2024-11-08 RX ORDER — DEXAMETHASONE SODIUM PHOSPHATE 10 MG/ML
INJECTION, SOLUTION INTRAMUSCULAR; INTRAVENOUS AS NEEDED
Status: DISCONTINUED | OUTPATIENT
Start: 2024-11-08 | End: 2024-11-08

## 2024-11-08 RX ORDER — ACETAMINOPHEN 500 MG
1000 TABLET ORAL EVERY 6 HOURS
Qty: 56 TABLET | Refills: 0 | Status: SHIPPED | OUTPATIENT
Start: 2024-11-08 | End: 2024-11-15

## 2024-11-08 RX ORDER — CEPHALEXIN 500 MG/1
500 CAPSULE ORAL 3 TIMES DAILY
Qty: 21 CAPSULE | Refills: 0 | Status: SHIPPED | OUTPATIENT
Start: 2024-11-08 | End: 2024-11-15

## 2024-11-08 RX ORDER — OXYCODONE HYDROCHLORIDE 5 MG/1
5 TABLET ORAL EVERY 6 HOURS PRN
Qty: 15 TABLET | Refills: 0 | Status: SHIPPED | OUTPATIENT
Start: 2024-11-08

## 2024-11-08 RX ORDER — ACETAMINOPHEN 325 MG/1
975 TABLET ORAL ONCE
Status: COMPLETED | OUTPATIENT
Start: 2024-11-08 | End: 2024-11-08

## 2024-11-08 RX ORDER — SODIUM CHLORIDE, SODIUM LACTATE, POTASSIUM CHLORIDE, CALCIUM CHLORIDE 600; 310; 30; 20 MG/100ML; MG/100ML; MG/100ML; MG/100ML
125 INJECTION, SOLUTION INTRAVENOUS CONTINUOUS
Status: DISCONTINUED | OUTPATIENT
Start: 2024-11-08 | End: 2024-11-08 | Stop reason: HOSPADM

## 2024-11-08 RX ORDER — ONDANSETRON 2 MG/ML
4 INJECTION INTRAMUSCULAR; INTRAVENOUS ONCE AS NEEDED
Status: DISCONTINUED | OUTPATIENT
Start: 2024-11-08 | End: 2024-11-08 | Stop reason: HOSPADM

## 2024-11-08 RX ORDER — PROPOFOL 10 MG/ML
INJECTION, EMULSION INTRAVENOUS AS NEEDED
Status: DISCONTINUED | OUTPATIENT
Start: 2024-11-08 | End: 2024-11-08

## 2024-11-08 RX ORDER — HYDROMORPHONE HCL IN WATER/PF 6 MG/30 ML
0.2 PATIENT CONTROLLED ANALGESIA SYRINGE INTRAVENOUS
Status: DISCONTINUED | OUTPATIENT
Start: 2024-11-08 | End: 2024-11-08 | Stop reason: HOSPADM

## 2024-11-08 RX ORDER — MIDAZOLAM HYDROCHLORIDE 2 MG/2ML
INJECTION, SOLUTION INTRAMUSCULAR; INTRAVENOUS AS NEEDED
Status: DISCONTINUED | OUTPATIENT
Start: 2024-11-08 | End: 2024-11-08

## 2024-11-08 RX ORDER — METOCLOPRAMIDE HYDROCHLORIDE 5 MG/ML
10 INJECTION INTRAMUSCULAR; INTRAVENOUS ONCE AS NEEDED
Status: DISCONTINUED | OUTPATIENT
Start: 2024-11-08 | End: 2024-11-08 | Stop reason: HOSPADM

## 2024-11-08 RX ORDER — ONDANSETRON 2 MG/ML
INJECTION INTRAMUSCULAR; INTRAVENOUS AS NEEDED
Status: DISCONTINUED | OUTPATIENT
Start: 2024-11-08 | End: 2024-11-08

## 2024-11-08 RX ORDER — ONDANSETRON 4 MG/1
4 TABLET, FILM COATED ORAL EVERY 8 HOURS PRN
Qty: 20 TABLET | Refills: 0 | Status: SHIPPED | OUTPATIENT
Start: 2024-11-08

## 2024-11-08 RX ORDER — LIDOCAINE HYDROCHLORIDE 10 MG/ML
INJECTION, SOLUTION EPIDURAL; INFILTRATION; INTRACAUDAL; PERINEURAL AS NEEDED
Status: DISCONTINUED | OUTPATIENT
Start: 2024-11-08 | End: 2024-11-08

## 2024-11-08 RX ORDER — PROMETHAZINE HYDROCHLORIDE 25 MG/ML
6.25 INJECTION, SOLUTION INTRAMUSCULAR; INTRAVENOUS ONCE AS NEEDED
Status: DISCONTINUED | OUTPATIENT
Start: 2024-11-08 | End: 2024-11-08 | Stop reason: HOSPADM

## 2024-11-08 RX ORDER — FENTANYL CITRATE 50 UG/ML
INJECTION, SOLUTION INTRAMUSCULAR; INTRAVENOUS AS NEEDED
Status: DISCONTINUED | OUTPATIENT
Start: 2024-11-08 | End: 2024-11-08

## 2024-11-08 RX ORDER — ENOXAPARIN SODIUM 100 MG/ML
40 INJECTION SUBCUTANEOUS DAILY
Qty: 2.8 ML | Refills: 0 | Status: SHIPPED | OUTPATIENT
Start: 2024-11-08 | End: 2024-11-15

## 2024-11-08 RX ORDER — FENTANYL CITRATE/PF 50 MCG/ML
25 SYRINGE (ML) INJECTION
Status: DISCONTINUED | OUTPATIENT
Start: 2024-11-08 | End: 2024-11-08 | Stop reason: HOSPADM

## 2024-11-08 RX ORDER — PHENYLEPHRINE HCL IN 0.9% NACL 1 MG/10 ML
SYRINGE (ML) INTRAVENOUS AS NEEDED
Status: DISCONTINUED | OUTPATIENT
Start: 2024-11-08 | End: 2024-11-08

## 2024-11-08 RX ADMIN — ACETAMINOPHEN 975 MG: 325 TABLET ORAL at 07:16

## 2024-11-08 RX ADMIN — CEFAZOLIN SODIUM 1000 MG: 1 SOLUTION INTRAVENOUS at 08:22

## 2024-11-08 RX ADMIN — DEXMEDETOMIDINE HYDROCHLORIDE 8 MCG: 100 INJECTION, SOLUTION, CONCENTRATE INTRAVENOUS at 10:52

## 2024-11-08 RX ADMIN — TRANEXAMIC ACID 1 G: 1 INJECTION, SOLUTION INTRAVENOUS at 09:51

## 2024-11-08 RX ADMIN — LIDOCAINE HYDROCHLORIDE 50 MG: 10 INJECTION, SOLUTION EPIDURAL; INFILTRATION; INTRACAUDAL at 08:10

## 2024-11-08 RX ADMIN — GABAPENTIN 300 MG: 300 CAPSULE ORAL at 07:17

## 2024-11-08 RX ADMIN — PROPOFOL 150 MG: 10 INJECTION, EMULSION INTRAVENOUS at 08:10

## 2024-11-08 RX ADMIN — PROPOFOL 30 MG: 10 INJECTION, EMULSION INTRAVENOUS at 08:12

## 2024-11-08 RX ADMIN — Medication 200 MCG: at 08:36

## 2024-11-08 RX ADMIN — ROCURONIUM BROMIDE 40 MG: 10 INJECTION, SOLUTION INTRAVENOUS at 08:10

## 2024-11-08 RX ADMIN — DEXAMETHASONE SODIUM PHOSPHATE 10 MG: 10 INJECTION, SOLUTION INTRAMUSCULAR; INTRAVENOUS at 08:10

## 2024-11-08 RX ADMIN — ONDANSETRON 4 MG: 2 INJECTION INTRAMUSCULAR; INTRAVENOUS at 10:53

## 2024-11-08 RX ADMIN — SODIUM CHLORIDE, SODIUM LACTATE, POTASSIUM CHLORIDE, AND CALCIUM CHLORIDE 125 ML/HR: .6; .31; .03; .02 INJECTION, SOLUTION INTRAVENOUS at 07:20

## 2024-11-08 RX ADMIN — PROPOFOL 100 MCG/KG/MIN: 10 INJECTION, EMULSION INTRAVENOUS at 08:13

## 2024-11-08 RX ADMIN — OXYCODONE HYDROCHLORIDE 5 MG: 5 TABLET ORAL at 13:50

## 2024-11-08 RX ADMIN — FENTANYL CITRATE 50 MCG: 50 INJECTION INTRAMUSCULAR; INTRAVENOUS at 08:10

## 2024-11-08 RX ADMIN — SUGAMMADEX 200 MG: 100 INJECTION, SOLUTION INTRAVENOUS at 11:03

## 2024-11-08 RX ADMIN — Medication 200 MCG: at 08:16

## 2024-11-08 RX ADMIN — MIDAZOLAM 2 MG: 1 INJECTION INTRAMUSCULAR; INTRAVENOUS at 08:02

## 2024-11-08 RX ADMIN — DEXMEDETOMIDINE HYDROCHLORIDE 8 MCG: 100 INJECTION, SOLUTION, CONCENTRATE INTRAVENOUS at 10:56

## 2024-11-08 RX ADMIN — ROCURONIUM BROMIDE 10 MG: 10 INJECTION, SOLUTION INTRAVENOUS at 08:45

## 2024-11-08 RX ADMIN — ENOXAPARIN SODIUM 40 MG: 40 INJECTION SUBCUTANEOUS at 07:17

## 2024-11-08 RX ADMIN — PHENYLEPHRINE HYDROCHLORIDE 30 MCG/MIN: 10 INJECTION INTRAVENOUS at 08:28

## 2024-11-08 RX ADMIN — ROCURONIUM BROMIDE 20 MG: 10 INJECTION, SOLUTION INTRAVENOUS at 09:54

## 2024-11-08 RX ADMIN — FENTANYL CITRATE 50 MCG: 50 INJECTION INTRAMUSCULAR; INTRAVENOUS at 08:58

## 2024-11-08 NOTE — NURSING NOTE
Pt c/o throbbing pain in right hand, skin is dusky, cool, and dry. Dr. Haywood notified, instructed to unwrap ace wrap below elbow and he would come in to see pt.

## 2024-11-08 NOTE — OP NOTE
OPERATIVE REPORT  PATIENT NAME: Yvonne Hirsch    :  1960  MRN: [id number]  Pt Location: [facility] ROOM 02    SURGERY DATE: 2024    Surgeons and Role:     * Lanre Haywood MD - Primary     * Pauly Urbano PA-C - Assisting    Preop Diagnosis:  Encounter for cosmetic surgery [Z41.1]    Post-Op Diagnosis Codes:     * Encounter for cosmetic surgery [Z41.1]    Procedure(s):  Bilateral - BRACHIOPLASTY    Specimen(s):  ID Type Source Tests Collected by Time Destination   1 : Left Arm Tissue Arm, Left TISSUE EXAM Lanre Haywood MD 2024  9:19 AM    2 : Right Arm Tissue Arm, Right TISSUE EXAM Lanre Haywood MD 2024  9:20 AM        Estimated Blood Loss:   Minimal    Drains:  Closed/Suction Drain Right Bulb 15 Fr. (Active)   Number of days: 0       Closed/Suction Drain Left 15 Fr. (Active)   Number of days: 0       Anesthesia Type:   General    Operative Indications:  Encounter for cosmetic surgery [Z41.1]  ***    Operative Findings:  ***      Complications:   {Post Op Complications:13123}    Procedure and Technique:  ***   {Op note attestation:07026}    Patient Disposition:  {op note disposition:50023}             SIGNATURE: Lanre Haywood MD  DATE: 2024  TIME: 10:54 AM                 procedures, all her questions were answered to her satisfaction, informed consent obtained and signed and the patient was brought to the hospital as an outpatient elective basis.  The patient was first brought to the preoperative holding area she was given preoperative antibiotics, her SCDs were activated she was given Lovenox prior to induction of anesthesia.  She was then brought to the OR where she is identified verbally, by site, and by armband on the operating table prior to induction of anesthesia.  After induction of anesthesia she was prepped and draped in standard surgical fashion, timeout is performed the surgical site identified.      At this point attention was then turned to the bilateral brachioplasty, the patient was marked in the preoperative holding area, the inferior border of the triceps muscle was marked at the condyle along the inferior border and to the chest wall as this would be the area that the scar would be positioned.  In order to determine the area of resection, downward traction was placed on the skin in order to find an area that would advance to the level of the inferior border of the triceps muscle.  A curvilinear incision was made to join the chest wall.  At this point the apex of dome of the axilla was marked, all incision was marked along the lateral border of the pectoralis major muscle, down onto the chest wall.  A point in the inferior most portion of the arm that would advance easily to the dome of the axilla was found with bimanual palpation, this area was marked in order to fashion a Z-plasty in the axilla to decrease the risk of axillary contracture., this area was along the lateral chest wall tailor tacked with towel clips to confirm the markings and in order to obtain the best shape and contour.  At this time tumescent was injected into the medial and posterior inferior aspect of the arm, wall allowing tumescent to take effect, the lateral chest wall excision was  performed by incising the area with a 15 blade, and taking the dissection down just below the skin to the subcutaneous tissue, Bovie electrocautery was used to excise this tissue.  Towel clips were then used after meticulous hemostasis was obtained to advance the tissue, this Tisseel fibrin glue was 1st infiltrated into the wound and a drain had been placed to minute that space.  At this time 0 Vicryl suture was used to approximate the superficial fascial system.  Attention was then turned to the arm where suction assisted lipectomy was performed in the medial and posterior inferior portion of the arm, endpoints of liposuction were the return of bloody aspirate, pinch test and visual inspection.  This was performed in order to aid in dissection and freed a superficial plane in order to decrease the risk of damage to the medial antebrachial cutaneous nerve in preparation for the dissection.  Once this was performed, the curvilinear incision was made, and using retraction posteriorly and inferiorly, Bovie electrocautery was used to dissect the subcutaneous tissue, once the liposuction plane was found, this proceeded to the posterior arm, the dissection plane was well above the fascia and a small amount of subcutaneous fat was left on the fascia to decrease the risk damaging the nerve, once the posterior arm was encountered, dissection was stopped, using Pitanguay flap demarcated forceps, the resection pattern was confirmed, using towel clips and firm retraction, the forceps were used to delineate the excision pattern, from retraction was used to tu the area, however a cm of relapse was then allowed on the skin in order to decrease the risk of closing under undue tension.  Once this was done elliptical pattern was formed, and excision was done with a 10 blade.  Bovie electrocautery was then used to obtain meticulous hemostasis.  The Z-plasty was completed and the triple point created axilla by using a 0 Vicryl to  anchor the deltopectoral fascia to the superficial fascial system.  Once this was performed, towel clips were once again used to close the skin and closure was performed using 0 Vicryl once the superficial fascial system was closed along both the arm axilla and lateral chest wall, a 3-0 Monocryl was used to approximate the dermis triple point in a 2-0/3-0 Stratafix was used to close the deep dermal layer in the subcuticular.  The patient tolerated the procedure well, this was then performed in the exact same fashion on the left side.         I was present for the entire procedure.    Patient Disposition:  PACU              SIGNATURE: Lanre Haywood MD  DATE: November 8, 2024  TIME: 10:54 AM

## 2024-11-08 NOTE — ANESTHESIA PREPROCEDURE EVALUATION
Procedure:  BRACHIOPLASTY (Bilateral: Arm Upper)    Relevant Problems   CARDIO   (+) Hypertension      ENDO   (+) Hypothyroidism      GI/HEPATIC   (+) Small bowel obstruction (HCC)      HEMATOLOGY   (+) Iron deficiency anemia      Rheumatology   (+) Internal hernia      Other   (+) Ventral hernia without obstruction or gangrene        Physical Exam    Airway    Mallampati score: I  TM Distance: >3 FB  Neck ROM: full     Dental   Comment: Upper partials     Cardiovascular  Cardiovascular exam normal    Pulmonary  Pulmonary exam normal     Other Findings  post-pubertal.      Anesthesia Plan  ASA Score- 3     Anesthesia Type- general with ASA Monitors.         Additional Monitors: arterial line.    Airway Plan:            Plan Factors-Exercise tolerance (METS): >4 METS.    Chart reviewed. EKG reviewed.  Existing labs reviewed. Patient summary reviewed.    Patient is not a current smoker.              Induction- intravenous.    Postoperative Plan- Plan for postoperative opioid use. Planned trial extubation        Informed Consent- Anesthetic plan and risks discussed with patient.  I personally reviewed this patient with the CRNA. Discussed and agreed on the Anesthesia Plan with the CRNA..

## 2024-11-08 NOTE — ANESTHESIA POSTPROCEDURE EVALUATION
Post-Op Assessment Note    CV Status:  Stable  Pain Score: 0    Pain management: adequate       Mental Status:  Arousable   Hydration Status:  Stable   PONV Controlled:  None   Airway Patency:  Patent     Post Op Vitals Reviewed: Yes    No anethesia notable event occurred.    Staff: Anesthesiologist           Last Filed PACU Vitals:  Vitals Value Taken Time   Temp     Pulse 76 11/08/24 1150   /82 11/08/24 1148   Resp 17 11/08/24 1150   SpO2 100 % 11/08/24 1150   Vitals shown include unfiled device data.    Modified Sarkis:  No data recorded

## 2024-11-08 NOTE — NURSING NOTE
Provider at bedside, instructed to remove b/l lower ace wraps to elbows. Pt reports instant pain relief, provider gave instructions to pt. Pt reports understanding. Will continue to monitor.

## 2024-11-08 NOTE — H&P
Subjective  Patient ID: Yvonne Hirsch is a 64 y.o. female.    Vitals:    11/08/24 0659   BP: 154/88   Pulse: 94   Resp: 18   Temp: (!) 97.4 °F (36.3 °C)   SpO2: 100%        HPI Patient is a 64-year-old female with a history of massive weight loss including Dontae-en-Y gastric bypass, maintenance with medications, who is here today for preoperative history and physical for bilateral brachioplasty on 9/10/2024.  The patient lost approximately 93 lbs overall and now has significant loose hanging skin and isn't happy with her arm shape and contour.  The patient complains of rashing, chronic irritation in the folds of her skin.  She has redness in the area that is particularly worse with increased activity and limits her overall daily function.  The patient has tried multiple medications and medical treatment has failed to adequately resolve the issue. She is a non smoker, she does not take steroids or blood thinners, she does not have a history of DVT.  She is up to date with her mammograms.  She denies medication allergies.  Patient does take Wegovy.     The following portions of the patient's history were reviewed and updated as appropriate: allergies, current medications, past family history, past medical history, past social history, past surgical history, and problem list.     Review of Systems   Skin:  Positive for rash.            Objective  Physical Exam   Vitals reviewed.  Constitutional  She appears well-developed and well-nourished.      Eyes  Pupils are equal, round, and reactive to light. System normal.      General -             Right: Right eye extraocular movements are normal.             Left: Left eye extraocular movements are normal.      Cardiovascular: Normal rate.      Pulmonary/Chest  Effort normal.      Skin  Skin is warm and dry.      Psychiatric  She has a normal mood and affect. Her behavior is normal. Judgment and thought content normal.   Bilateral arms--significant excess of bilateral arms in  the vertical and horizontal direction, she is chronic inflammation, acute inflammation and excoriation under her arms but no acute cellulitis.     Medical History        Past Medical History:   Diagnosis Date    Disease of thyroid gland      Eating disorder Birth     Over eating    Hypertension      Obesity      Postgastrectomy malabsorption      Shingles 20     Mild case - resolved         Problem List       Patient Active Problem List   Diagnosis    Ischemic necrosis of small bowel (HCC)    Internal hernia    Hypertension    Hypothyroidism    Iron deficiency anemia    S/P exploratory laparotomy    Small bowel obstruction (HCC)    Postsurgical malabsorption    Weight gain following gastric bypass surgery    COVID-19 virus infection    Encounter to establish care    Annual physical exam    Class 2 severe obesity due to excess calories with serious comorbidity and body mass index (BMI) of 38.0 to 38.9 in adult (HCC)    Ventral hernia without obstruction or gangrene    Fungal infection    Preoperative clearance         Surgical History         Past Surgical History:   Procedure Laterality Date    BARIATRIC SURGERY   2015     SECTION        GASTRIC BYPASS        [place] LAPS ABD PRTM&OMENTUM DX W/WO SPEC BR/WA SPX N/A 2019     Procedure: LAPAROSCOPY DIAGNOSTIC CONVERTED TO OPEN; SMALL BOWEL RESECTION; LYSIS OF ADHESIONS;  Surgeon: John Simon MD;  Location: [facility];  Service: General    SMALL INTESTINE SURGERY   19    TRIGGER FINGER RELEASE Right                Current Outpatient Medications   Medication Instructions    calcium citrate (CALCITRATE) 950 MG tablet 1 tablet, Oral, Daily    cholecalciferol (VITAMIN D3) 25 mcg (1,000 units) tablet No dose, route, or frequency recorded.    levothyroxine (EUTHYROX) 50 mcg, Oral, Daily (early morning)    metoprolol succinate (TOPROL-XL) 25 mg, Oral, Daily    Multiple Vitamins-Minerals (MULTIVITAMIN WITH MINERALS) tablet 1 tablet, Oral, Daily     nystatin (MYCOSTATIN) cream Topical, 2 times daily    Probiotic Product (PROBIOTIC DAILY PO) Oral    Semaglutide-Weight Management (WEGOVY) 2.4 mg, Subcutaneous, Weekly      Social History          Social History Narrative    Not on file      Tobacco Use History   Social History           Tobacco Use   Smoking Status Former    Current packs/day: 0.00    Average packs/day: 1 pack/day for 15.6 years (15.6 ttl pk-yrs)    Types: Cigarettes    Start date: 1976    Quit date: 1992    Years since quittin.7   Smokeless Tobacco Never            Assessment & Plan 64-year-old female with massive weight loss and excess skin of the arms for bilateral brachioplasty     Medical and cardiac clearances reviewed.  Mammogram reviewed.  Hold Wegovy     Today I discussed the surgery in detail with the patient regarding the expected length of surgery, the expected hospital stay, and the expected recovery time. We discussed the potential risks and complications such as bleeding, infection, scarring, hematoma, seroma, asymmetry, contour deformity, skin necrosis, axillary contracture, damage to surrounding structures, wound healing complications, poor cosmetic result, and need for additional procedures.  The use of drains was discussed. Postoperative activity restrictions and medications were discussed. The patient understands and agrees to the plan of care and does not have any additional questions at this time. Informed consent was obtained today for the upcoming surgery.        Pauly Longoria PA-C  Plastic & Reconstructive Surgery        Discussed the risks, benefits and alternatives as above including the relative high risk of wound healing complication, damage to the medial antebrachial cutaneous nerve, skin relapse and need for more surgery.  All her questions answered to her satisfaction.  Informed consent obtained and signed.  Will proceed to OR as scheduled.  Lanre Haywood  24  7:59 AM

## 2024-11-14 PROCEDURE — 88302 TISSUE EXAM BY PATHOLOGIST: CPT | Performed by: PATHOLOGY

## 2024-11-15 ENCOUNTER — OFFICE VISIT (OUTPATIENT)
Age: 64
End: 2024-11-15

## 2024-11-15 VITALS
WEIGHT: 167.25 LBS | OXYGEN SATURATION: 99 % | BODY MASS INDEX: 27.86 KG/M2 | HEART RATE: 61 BPM | TEMPERATURE: 97.6 F | HEIGHT: 65 IN

## 2024-11-15 DIAGNOSIS — Z48.89 ENCOUNTER FOR FOLLOW-UP CARE INVOLVING PLASTIC SURGERY: Primary | ICD-10-CM

## 2024-11-15 PROCEDURE — RECHECK: Performed by: PHYSICIAN ASSISTANT

## 2024-11-22 ENCOUNTER — TELEPHONE (OUTPATIENT)
Age: 64
End: 2024-11-22

## 2024-11-22 ENCOUNTER — OFFICE VISIT (OUTPATIENT)
Age: 64
End: 2024-11-22

## 2024-11-22 VITALS
BODY MASS INDEX: 27.39 KG/M2 | TEMPERATURE: 98.3 F | OXYGEN SATURATION: 96 % | HEIGHT: 65 IN | HEART RATE: 81 BPM | WEIGHT: 164.4 LBS

## 2024-11-22 DIAGNOSIS — E66.01 CLASS 2 SEVERE OBESITY DUE TO EXCESS CALORIES WITH SERIOUS COMORBIDITY AND BODY MASS INDEX (BMI) OF 38.0 TO 38.9 IN ADULT (HCC): Primary | ICD-10-CM

## 2024-11-22 DIAGNOSIS — R60.0 ARM EDEMA: Primary | ICD-10-CM

## 2024-11-22 DIAGNOSIS — Z48.89 ENCOUNTER FOR FOLLOW-UP CARE INVOLVING PLASTIC SURGERY: Primary | ICD-10-CM

## 2024-11-22 DIAGNOSIS — E66.812 CLASS 2 SEVERE OBESITY DUE TO EXCESS CALORIES WITH SERIOUS COMORBIDITY AND BODY MASS INDEX (BMI) OF 38.0 TO 38.9 IN ADULT (HCC): Primary | ICD-10-CM

## 2024-11-22 PROCEDURE — RECHECK: Performed by: PHYSICIAN ASSISTANT

## 2024-11-22 RX ORDER — FUROSEMIDE 20 MG/1
20 TABLET ORAL DAILY
Qty: 14 TABLET | Refills: 0 | Status: SHIPPED | OUTPATIENT
Start: 2024-11-22

## 2024-11-22 NOTE — PROGRESS NOTES
Plastic Surgery Follow Up Note:     HPI: The patient is a very pleasant 64-year-old female presenting today for a postoperative follow-up status post bilateral brachioplasty on 11/8/2024.  The patient states she is doing very well, she reports minimal pain.  She denies any fever or chills.  She has been resting, wearing compression on her arms, and taking postoperative medications as prescribed.  She states that she has been taking minimal pain medicine.  She has been applying abx ointment to her incisions.     Past Medical History:   Diagnosis Date    Disease of thyroid gland     Eating disorder Birth    Over eating    Hypertension     Obesity     Postgastrectomy malabsorption     Shingles 12/7/20    Mild case - resolved       Patient Active Problem List   Diagnosis    Ischemic necrosis of small bowel (HCC)    Internal hernia    Hypertension    Hypothyroidism    Iron deficiency anemia    S/P exploratory laparotomy    Small bowel obstruction (HCC)    Postsurgical malabsorption    Weight gain following gastric bypass surgery    COVID-19 virus infection    Encounter to establish care    Annual physical exam    Class 2 severe obesity due to excess calories with serious comorbidity and body mass index (BMI) of 38.0 to 38.9 in adult (HCC)    Ventral hernia without obstruction or gangrene    Fungal infection    Preoperative clearance         Current Outpatient Medications:     calcium citrate (CALCITRATE) 950 MG tablet, Take 1 tablet by mouth daily, Disp: , Rfl:     cholecalciferol (VITAMIN D3) 25 mcg (1,000 units) tablet, , Disp: , Rfl:     levothyroxine (Euthyrox) 50 mcg tablet, Take 1 tablet (50 mcg total) by mouth daily in the early morning, Disp: 100 tablet, Rfl: 1    metoprolol succinate (TOPROL-XL) 25 mg 24 hr tablet, Take 1 tablet (25 mg total) by mouth daily, Disp: 90 tablet, Rfl: 1    Multiple Vitamins-Minerals (MULTIVITAMIN WITH MINERALS) tablet, Take 1 tablet by mouth daily, Disp: , Rfl:     Probiotic Product  (PROBIOTIC DAILY PO), Take by mouth, Disp: , Rfl:     Semaglutide-Weight Management (WEGOVY) 2.4 MG/0.75ML, Inject 2.4 mg under the skin every 10 days Last taken 10/30/24, Disp: , Rfl:     cyclobenzaprine (FLEXERIL) 10 mg tablet, Take 1 tablet (10 mg total) by mouth 3 (three) times a day for 7 days, Disp: 21 tablet, Rfl: 0    enoxaparin (Lovenox) 40 mg/0.4 mL, Inject 0.4 mL (40 mg total) under the skin in the morning for 7 days, Disp: 2.8 mL, Rfl: 0    gabapentin (Neurontin) 300 mg capsule, Take 1 capsule (300 mg total) by mouth 3 (three) times a day for 7 days, Disp: 21 capsule, Rfl: 0    nystatin (MYCOSTATIN) cream, Apply topically 2 (two) times a day (Patient not taking: Reported on 2024), Disp: 30 g, Rfl: 0    ondansetron (ZOFRAN) 4 mg tablet, Take 1 tablet (4 mg total) by mouth every 8 (eight) hours as needed for nausea or vomiting, Disp: 20 tablet, Rfl: 0    oxyCODONE (Roxicodone) 5 immediate release tablet, Take 1 tablet (5 mg total) by mouth every 6 (six) hours as needed for severe pain Max Daily Amount: 20 mg, Disp: 15 tablet, Rfl: 0    Past Surgical History:   Procedure Laterality Date    BARIATRIC SURGERY  2015     SECTION      GASTRIC BYPASS      NM EXCISION EXCESSIVE SKIN & SUBQ TISSUE ARM Bilateral 2024    Procedure: BRACHIOPLASTY;  Surgeon: Lanre Haywood MD;  Location: [facility];  Service: Plastics    NM LAPS ABD PRTM&OMENTUM DX W/WO SPEC BR/WA SPX N/A 2019    Procedure: LAPAROSCOPY DIAGNOSTIC CONVERTED TO OPEN; SMALL BOWEL RESECTION; LYSIS OF ADHESIONS;  Surgeon: John Simon MD;  Location: [facility];  Service: General    SMALL INTESTINE SURGERY  19    TRIGGER FINGER RELEASE Right        Social History     Tobacco Use    Smoking status: Former     Current packs/day: 0.00     Average packs/day: 1 pack/day for 15.6 years (15.6 ttl pk-yrs)     Types: Cigarettes     Start date: 1976     Quit date: 1992     Years since quittin.9    Smokeless tobacco:  Never   Substance Use Topics    Alcohol use: Yes     Alcohol/week: 2.0 standard drinks of alcohol     Comment: Rarely       Objective:  Vitals:    11/22/24 1435   Pulse: 81   Temp: 98.3 °F (36.8 °C)   SpO2: 96%       Physical Exam:  General: NAD, alert, cooperative    BLE: Bilateral incisions are clean, dry, and intact.  Along the medial portion of the right arm incision, there is some scabbing, no significant wound breakdown, improved.  There is no evidence of hematoma or seroma formation.  There is no surrounding erythema, drainage, or signs of infection.  Expected amount of postoperative edema and ecchymosis, within normal limits.      Assessment: 64-year-old female status post bilateral brachioplasty    Plan:  The patient is doing incredibly well postoperatively.  She is healing very well, although she does have 1 area of scabbing along the medial portion of her right arm incision, will continue to monitor this, has healed greatly since last week. She is able to shower, and apply ointment to all incisions twice daily. Recommend ointment, xeroform and abd to mid right arm as this area has a risk of wound breakdown. Continue resting, elevating arms, and wearing compression around-the-clock.  Will see patient back in 2 weeks for follow-up.  Patient does not need any pain medication refills at this time.      Pauly Longoria PA-C  Plastic & Reconstructive Surgery

## 2024-11-22 NOTE — TELEPHONE ENCOUNTER
Patient called because she had an appointment today with the surgeon who did her arm lift 2 weeks ago.  Due to the fact she has gained 8 lbs of water weight, the doctor suggested she call her PCP to see if she could be put on a diuretic for a week.    Medication can be sent to:  [facility] [place] - [address] [phone]     Please advise, thank you.

## 2024-11-22 NOTE — LETTER
November 26, 2024     Patient: Yvonne Hirsch   YOB: 1960   Date of Visit: 11/22/2024       To Whom it May Concern:    Yvonne Hirsch was seen in my clinic on 11/22/2024. She may return to work on 12/04/2024, with sedentary work with restrictions, limited range of motion with bilateral arms; unable to lift over 10 lbs until next office visit on 12/12/2024.       If you have any questions or concerns, please don't hesitate to call.         Sincerely,          Pauly Urbano PA-C        CC: No Recipients

## 2024-11-22 NOTE — LETTER
November 22, 2024     Patient: Yvonne Hirsch   YOB: 1960   Date of Visit: 11/22/2024       To Whom it May Concern:    Yvonne Hirsch was seen in my clinic on 11/22/2024. She may return to work on 12/04/2024 with restrictions, limited range of motion with bilateral arms  .    If you have any questions or concerns, please don't hesitate to call.         Sincerely,          Pauly Urbano PA-C        CC: No Recipients

## 2024-11-29 ENCOUNTER — APPOINTMENT (OUTPATIENT)
Dept: LAB | Facility: CLINIC | Age: 64
End: 2024-11-29
Payer: COMMERCIAL

## 2024-11-29 ENCOUNTER — OFFICE VISIT (OUTPATIENT)
Dept: FAMILY MEDICINE CLINIC | Facility: CLINIC | Age: 64
End: 2024-11-29
Payer: COMMERCIAL

## 2024-11-29 VITALS
BODY MASS INDEX: 26.82 KG/M2 | RESPIRATION RATE: 16 BRPM | WEIGHT: 161 LBS | OXYGEN SATURATION: 100 % | TEMPERATURE: 97.7 F | HEIGHT: 65 IN | HEART RATE: 78 BPM

## 2024-11-29 DIAGNOSIS — I10 PRIMARY HYPERTENSION: ICD-10-CM

## 2024-11-29 DIAGNOSIS — Z79.899 MEDICATION MANAGEMENT: ICD-10-CM

## 2024-11-29 DIAGNOSIS — R60.0 EDEMA OF BOTH UPPER ARMS: Primary | ICD-10-CM

## 2024-11-29 DIAGNOSIS — Z12.11 COLON CANCER SCREENING: ICD-10-CM

## 2024-11-29 LAB
ALBUMIN SERPL BCG-MCNC: 4 G/DL (ref 3.5–5)
ALP SERPL-CCNC: 81 U/L (ref 34–104)
ALT SERPL W P-5'-P-CCNC: 29 U/L (ref 7–52)
ANION GAP SERPL CALCULATED.3IONS-SCNC: 4 MMOL/L (ref 4–13)
AST SERPL W P-5'-P-CCNC: 31 U/L (ref 13–39)
BILIRUB SERPL-MCNC: 0.43 MG/DL (ref 0.2–1)
BUN SERPL-MCNC: 26 MG/DL (ref 5–25)
CALCIUM SERPL-MCNC: 9.5 MG/DL (ref 8.4–10.2)
CHLORIDE SERPL-SCNC: 104 MMOL/L (ref 96–108)
CO2 SERPL-SCNC: 32 MMOL/L (ref 21–32)
CREAT SERPL-MCNC: 0.63 MG/DL (ref 0.6–1.3)
GFR SERPL CREATININE-BSD FRML MDRD: 95 ML/MIN/1.73SQ M
GLUCOSE P FAST SERPL-MCNC: 87 MG/DL (ref 65–99)
POTASSIUM SERPL-SCNC: 4.6 MMOL/L (ref 3.5–5.3)
PROT SERPL-MCNC: 7.1 G/DL (ref 6.4–8.4)
SODIUM SERPL-SCNC: 140 MMOL/L (ref 135–147)

## 2024-11-29 PROCEDURE — 80053 COMPREHEN METABOLIC PANEL: CPT

## 2024-11-29 PROCEDURE — 99213 OFFICE O/P EST LOW 20 MIN: CPT | Performed by: NURSE PRACTITIONER

## 2024-11-29 PROCEDURE — 36415 COLL VENOUS BLD VENIPUNCTURE: CPT

## 2024-11-29 NOTE — PROGRESS NOTES
"Name: Yvonne Hirsch      : 1960      MRN: [id number]  Encounter Provider: FINESSE Martínez  Encounter Date: 2024   Encounter department: [facility] PRIMARY CARE  :  Assessment & Plan  Edema of both upper arms  Patient had bilateral upper arm edema post brachioplasty.  Was given 5 doses of Lasix.  Edema has been resolving.  Blood work ordered to check for potassium level.  Patient does have compression.  Continue follow-up with surgery continue to monitor for any signs of infection.         Colon cancer screening    Orders:    Ambulatory referral to Gastroenterology; Future    Medication management    Orders:    Comprehensive metabolic panel; Future    Primary hypertension  Pressure due to surgical incision.  Continue with medication.  Continue heart healthy diet                History of Present Illness     Patient is here to follow-up on bilateral arm edema.  Had brachioplasty done.  Did take 1 week of Lasix to help with edema.      Review of Systems   Constitutional: Negative.    HENT: Negative.     Eyes: Negative.    Respiratory: Negative.     Cardiovascular: Negative.    Gastrointestinal: Negative.    Endocrine: Negative.    Genitourinary: Negative.    Musculoskeletal: Negative.    Skin:         Surgical incision bilateral arms   Allergic/Immunologic: Negative.    Neurological: Negative.    Psychiatric/Behavioral: Negative.            Objective   Pulse 78   Temp 97.7 °F (36.5 °C) (Temporal)   Resp 16   Ht 5' 5\" (1.651 m)   Wt 73 kg (161 lb)   SpO2 100%   BMI 26.79 kg/m²      Physical Exam  Constitutional:       General: She is not in acute distress.     Appearance: Normal appearance. She is not ill-appearing.   HENT:      Head: Normocephalic and atraumatic.      Nose: Nose normal.      Mouth/Throat:      Mouth: Mucous membranes are moist.   Eyes:      Pupils: Pupils are equal, round, and reactive to light.   Cardiovascular:      Rate and Rhythm: Normal rate and regular rhythm. "      Pulses: Normal pulses.   Pulmonary:      Effort: Pulmonary effort is normal. No respiratory distress.      Breath sounds: Normal breath sounds.   Chest:      Chest wall: No tenderness.   Abdominal:      General: Abdomen is flat. Bowel sounds are normal. There is no distension.      Palpations: There is no mass.      Tenderness: There is no abdominal tenderness.   Musculoskeletal:         General: Normal range of motion.      Cervical back: Normal range of motion and neck supple.   Skin:     General: Skin is warm and dry.      Comments: Surgical incision, open to air, mild edema noted to right arm.  No signs of infection.   Neurological:      General: No focal deficit present.      Mental Status: She is alert and oriented to person, place, and time.   Psychiatric:         Mood and Affect: Mood normal.         Behavior: Behavior normal.         Thought Content: Thought content normal.         Judgment: Judgment normal.

## 2024-11-29 NOTE — ASSESSMENT & PLAN NOTE
Patient had bilateral upper arm edema post brachioplasty.  Was given 5 doses of Lasix.  Edema has been resolving.  Blood work ordered to check for potassium level.  Patient does have compression.  Continue follow-up with surgery continue to monitor for any signs of infection.

## 2024-12-03 ENCOUNTER — RESULTS FOLLOW-UP (OUTPATIENT)
Dept: FAMILY MEDICINE CLINIC | Facility: CLINIC | Age: 64
End: 2024-12-03

## 2024-12-09 ENCOUNTER — TELEPHONE (OUTPATIENT)
Age: 64
End: 2024-12-09

## 2024-12-09 NOTE — TELEPHONE ENCOUNTER
Rec'd call from patient stating that her surgery site on right arm is oozing liquid and will like to come in sooner than Thursday 12/12/2024 with Pauly. Advised for patient to take pictures and send it in.    Please reach out to patient as I was rescheduling the appointment it took me out to the [facility]

## 2024-12-12 ENCOUNTER — OFFICE VISIT (OUTPATIENT)
Age: 64
End: 2024-12-12

## 2024-12-12 DIAGNOSIS — Z48.89 ENCOUNTER FOR FOLLOW-UP CARE INVOLVING PLASTIC SURGERY: Primary | ICD-10-CM

## 2024-12-12 DIAGNOSIS — S41.101A: ICD-10-CM

## 2024-12-12 PROCEDURE — RECHECK: Performed by: PHYSICIAN ASSISTANT

## 2024-12-12 NOTE — PROGRESS NOTES
Plastic Surgery Follow Up Note:     HPI: The patient is a very pleasant 64-year-old female presenting today for a postoperative follow-up status post bilateral brachioplasty on 11/8/2024.  The patient states that area of scabbing of right arm incision has opened up into a wound. She has been applying ointment, xeroform, and ABD to this area, and applying ointment to all other incisions. She reports minimal pain.  She denies any fever or chills.  She has been wearing compression on her arms.     Past Medical History:   Diagnosis Date    Disease of thyroid gland     Eating disorder Birth    Over eating    Hypertension     Obesity     Postgastrectomy malabsorption     Shingles 12/7/20    Mild case - resolved     Patient Active Problem List   Diagnosis    Ischemic necrosis of small bowel (HCC)    Internal hernia    Hypertension    Hypothyroidism    Iron deficiency anemia    S/P exploratory laparotomy    Small bowel obstruction (HCC)    Postsurgical malabsorption    Weight gain following gastric bypass surgery    COVID-19 virus infection    Encounter to establish care    Annual physical exam    Class 2 severe obesity due to excess calories with serious comorbidity and body mass index (BMI) of 38.0 to 38.9 in adult (HCC)    Ventral hernia without obstruction or gangrene    Fungal infection    Preoperative clearance    Edema of both upper arms       Current Outpatient Medications:     collagenase (SANTYL) ointment, Apply topically daily Wound measurements: 5 cm x 2.5 cm x 0.1 cm, Disp: 90 g, Rfl: 1    calcium citrate (CALCITRATE) 950 MG tablet, Take 1 tablet by mouth daily, Disp: , Rfl:     cholecalciferol (VITAMIN D3) 25 mcg (1,000 units) tablet, , Disp: , Rfl:     cyclobenzaprine (FLEXERIL) 10 mg tablet, Take 1 tablet (10 mg total) by mouth 3 (three) times a day for 7 days, Disp: 21 tablet, Rfl: 0    enoxaparin (Lovenox) 40 mg/0.4 mL, Inject 0.4 mL (40 mg total) under the skin in the morning for 7 days, Disp: 2.8 mL,  Rfl: 0    furosemide (LASIX) 20 mg tablet, Take 1 tablet (20 mg total) by mouth daily, Disp: 14 tablet, Rfl: 0    gabapentin (Neurontin) 300 mg capsule, Take 1 capsule (300 mg total) by mouth 3 (three) times a day for 7 days, Disp: 21 capsule, Rfl: 0    levothyroxine (Euthyrox) 50 mcg tablet, Take 1 tablet (50 mcg total) by mouth daily in the early morning, Disp: 100 tablet, Rfl: 1    metoprolol succinate (TOPROL-XL) 25 mg 24 hr tablet, Take 1 tablet (25 mg total) by mouth daily, Disp: 90 tablet, Rfl: 1    Multiple Vitamins-Minerals (MULTIVITAMIN WITH MINERALS) tablet, Take 1 tablet by mouth daily, Disp: , Rfl:     nystatin (MYCOSTATIN) cream, Apply topically 2 (two) times a day (Patient not taking: Reported on 2024), Disp: 30 g, Rfl: 0    ondansetron (ZOFRAN) 4 mg tablet, Take 1 tablet (4 mg total) by mouth every 8 (eight) hours as needed for nausea or vomiting, Disp: 20 tablet, Rfl: 0    oxyCODONE (Roxicodone) 5 immediate release tablet, Take 1 tablet (5 mg total) by mouth every 6 (six) hours as needed for severe pain Max Daily Amount: 20 mg, Disp: 15 tablet, Rfl: 0    Probiotic Product (PROBIOTIC DAILY PO), Take by mouth, Disp: , Rfl:     Semaglutide-Weight Management (WEGOVY) 2.4 MG/0.75ML, Inject 0.75 mL (2.4 mg total) under the skin every 10 days Last taken 10/30/24, Disp: 2.25 mL, Rfl: 0    Past Surgical History:   Procedure Laterality Date    BARIATRIC SURGERY  2015     SECTION      GASTRIC BYPASS      MT EXCISION EXCESSIVE SKIN & SUBQ TISSUE ARM Bilateral 2024    Procedure: BRACHIOPLASTY;  Surgeon: Lanre Haywood MD;  Location: [facility];  Service: Plastics    MT LAPS ABD PRTM&OMENTUM DX W/WO SPEC BR/WA SPX N/A 2019    Procedure: LAPAROSCOPY DIAGNOSTIC CONVERTED TO OPEN; SMALL BOWEL RESECTION; LYSIS OF ADHESIONS;  Surgeon: John Simon MD;  Location: [facility];  Service: General    SMALL INTESTINE SURGERY  19    TRIGGER FINGER RELEASE Right      Social History      Tobacco Use    Smoking status: Former     Current packs/day: 0.00     Average packs/day: 1 pack/day for 15.6 years (15.6 ttl pk-yrs)     Types: Cigarettes     Start date: 1976     Quit date: 1992     Years since quittin.9    Smokeless tobacco: Never   Substance Use Topics    Alcohol use: Yes     Alcohol/week: 2.0 standard drinks of alcohol     Comment: Rarely     Objective:  There were no vitals filed for this visit.    Physical Exam:  General: NAD, alert, cooperative    BLE: Bilateral incisions are mostly clean, dry, and intact.  Along the medial portion of the right arm incision, there is mild superficial wound breakdown, about 5cm x 2.5cm, necrotic tissue present in wound.  There is no evidence of hematoma or seroma formation.  There is no surrounding erythema, drainage, or signs of infection.  Expected amount of postoperative edema, within normal limits.      Assessment: 64-year-old female status post bilateral brachioplasty    Plan:  Patient has developed superficial right arm wound - Recommend Santyl ointment, xeroform and ABD 2x daily. Vaseline/Aquaphor to all other incisions daily. Continue resting, elevating arms, and wearing compression around-the-clock.  Will see patient back in 2 weeks for follow-up.       Pauly Longoria PA-C  Plastic & Reconstructive Surgery

## 2024-12-16 ENCOUNTER — TELEPHONE (OUTPATIENT)
Age: 64
End: 2024-12-16

## 2024-12-22 DIAGNOSIS — E66.812 CLASS 2 SEVERE OBESITY DUE TO EXCESS CALORIES WITH SERIOUS COMORBIDITY AND BODY MASS INDEX (BMI) OF 38.0 TO 38.9 IN ADULT (HCC): ICD-10-CM

## 2024-12-22 DIAGNOSIS — I10 PRIMARY HYPERTENSION: ICD-10-CM

## 2024-12-22 DIAGNOSIS — E66.01 CLASS 2 SEVERE OBESITY DUE TO EXCESS CALORIES WITH SERIOUS COMORBIDITY AND BODY MASS INDEX (BMI) OF 38.0 TO 38.9 IN ADULT (HCC): ICD-10-CM

## 2024-12-22 DIAGNOSIS — E03.9 HYPOTHYROIDISM, UNSPECIFIED TYPE: ICD-10-CM

## 2024-12-23 RX ORDER — LEVOTHYROXINE SODIUM 50 UG/1
50 TABLET ORAL
Qty: 100 TABLET | Refills: 0 | OUTPATIENT
Start: 2024-12-23

## 2024-12-23 RX ORDER — METOPROLOL SUCCINATE 25 MG/1
25 TABLET, EXTENDED RELEASE ORAL DAILY
Qty: 90 TABLET | Refills: 0 | OUTPATIENT
Start: 2024-12-23

## 2025-01-06 ENCOUNTER — OFFICE VISIT (OUTPATIENT)
Age: 65
End: 2025-01-06

## 2025-01-06 DIAGNOSIS — Z48.89 ENCOUNTER FOR FOLLOW-UP CARE INVOLVING PLASTIC SURGERY: Primary | ICD-10-CM

## 2025-01-06 PROCEDURE — RECHECK: Performed by: PHYSICIAN ASSISTANT

## 2025-01-06 NOTE — PROGRESS NOTES
Plastic Surgery Follow Up Note:     HPI: The patient is a very pleasant 64-year-old female presenting today for a postoperative follow-up status post bilateral brachioplasty on 11/8/2024.    The patient states that RUE wound has improved a lot, she has been applying ointment, xeroform, and ABD to this area. She denies any pain, other wounds, fever or chills.  She has been wearing compression on her arms. Not currently using scar care.     Past Medical History:   Diagnosis Date    Disease of thyroid gland     Eating disorder Birth    Over eating    Hypertension     Obesity     Postgastrectomy malabsorption     Shingles 12/7/20    Mild case - resolved     Patient Active Problem List   Diagnosis    Ischemic necrosis of small bowel (HCC)    Internal hernia    Hypertension    Hypothyroidism    Iron deficiency anemia    S/P exploratory laparotomy    Small bowel obstruction (HCC)    Postsurgical malabsorption    Weight gain following gastric bypass surgery    COVID-19 virus infection    Encounter to establish care    Annual physical exam    Class 2 severe obesity due to excess calories with serious comorbidity and body mass index (BMI) of 38.0 to 38.9 in adult (HCC)    Ventral hernia without obstruction or gangrene    Fungal infection    Preoperative clearance    Edema of both upper arms       Current Outpatient Medications:     calcium citrate (CALCITRATE) 950 MG tablet, Take 1 tablet by mouth daily, Disp: , Rfl:     cholecalciferol (VITAMIN D3) 25 mcg (1,000 units) tablet, , Disp: , Rfl:     collagenase (SANTYL) ointment, Apply topically daily Wound measurements: 5 cm x 2.5 cm x 0.1 cm, Disp: 90 g, Rfl: 1    cyclobenzaprine (FLEXERIL) 10 mg tablet, Take 1 tablet (10 mg total) by mouth 3 (three) times a day for 7 days, Disp: 21 tablet, Rfl: 0    enoxaparin (Lovenox) 40 mg/0.4 mL, Inject 0.4 mL (40 mg total) under the skin in the morning for 7 days, Disp: 2.8 mL, Rfl: 0    furosemide (LASIX) 20 mg tablet, Take 1 tablet (20  mg total) by mouth daily, Disp: 14 tablet, Rfl: 0    gabapentin (Neurontin) 300 mg capsule, Take 1 capsule (300 mg total) by mouth 3 (three) times a day for 7 days, Disp: 21 capsule, Rfl: 0    levothyroxine (Euthyrox) 50 mcg tablet, Take 1 tablet (50 mcg total) by mouth daily in the early morning, Disp: 100 tablet, Rfl: 1    metoprolol succinate (TOPROL-XL) 25 mg 24 hr tablet, Take 1 tablet (25 mg total) by mouth daily, Disp: 90 tablet, Rfl: 1    Multiple Vitamins-Minerals (MULTIVITAMIN WITH MINERALS) tablet, Take 1 tablet by mouth daily, Disp: , Rfl:     nystatin (MYCOSTATIN) cream, Apply topically 2 (two) times a day (Patient not taking: Reported on 2024), Disp: 30 g, Rfl: 0    ondansetron (ZOFRAN) 4 mg tablet, Take 1 tablet (4 mg total) by mouth every 8 (eight) hours as needed for nausea or vomiting, Disp: 20 tablet, Rfl: 0    oxyCODONE (Roxicodone) 5 immediate release tablet, Take 1 tablet (5 mg total) by mouth every 6 (six) hours as needed for severe pain Max Daily Amount: 20 mg, Disp: 15 tablet, Rfl: 0    Probiotic Product (PROBIOTIC DAILY PO), Take by mouth, Disp: , Rfl:     Semaglutide-Weight Management (WEGOVY) 2.4 MG/0.75ML, Inject 0.75 mL (2.4 mg total) under the skin every 10 days Last taken 10/30/24, Disp: 2.25 mL, Rfl: 0    Past Surgical History:   Procedure Laterality Date    BARIATRIC SURGERY  2015     SECTION      GASTRIC BYPASS      SD EXCISION EXCESSIVE SKIN & SUBQ TISSUE ARM Bilateral 2024    Procedure: BRACHIOPLASTY;  Surgeon: Lanre Haywood MD;  Location: [facility];  Service: Plastics    SD LAPS ABD PRTM&OMENTUM DX W/WO SPEC BR/WA SPX N/A 2019    Procedure: LAPAROSCOPY DIAGNOSTIC CONVERTED TO OPEN; SMALL BOWEL RESECTION; LYSIS OF ADHESIONS;  Surgeon: John Simon MD;  Location: [facility];  Service: General    SMALL INTESTINE SURGERY  19    TRIGGER FINGER RELEASE Right      Social History     Tobacco Use    Smoking status: Former     Current packs/day:  0.00     Average packs/day: 1 pack/day for 15.6 years (15.6 ttl pk-yrs)     Types: Cigarettes     Start date: 1976     Quit date: 1992     Years since quittin.0    Smokeless tobacco: Never   Substance Use Topics    Alcohol use: Yes     Alcohol/week: 2.0 standard drinks of alcohol     Comment: Rarely     Objective:  There were no vitals filed for this visit.    Physical Exam:  General: NAD, alert, cooperative    BLE: Bilateral incisions are mostly clean, dry, and intact.  Along the medial portion of the right arm incision, there is mild superficial wound breakdown, about 2.5cm x 2cm.  There is no evidence of hematoma or seroma formation.  There is no surrounding erythema, drainage, or signs of infection.  Expected amount of postoperative edema, within normal limits.      Assessment: 64-year-old female status post bilateral brachioplasty    Plan:  Patient has improving superficial right arm wound - Recommend continuing ointment, xeroform and ABD daily until healed. Educated patient on scar massage. Continue compression during the day, and activity AT. RTO 4-6 weeks.         Pauly Urbano PA-C  Plastic & Reconstructive Surgery

## 2025-01-15 DIAGNOSIS — E03.9 HYPOTHYROIDISM, UNSPECIFIED TYPE: ICD-10-CM

## 2025-01-15 DIAGNOSIS — I10 PRIMARY HYPERTENSION: ICD-10-CM

## 2025-01-16 RX ORDER — METOPROLOL SUCCINATE 25 MG/1
25 TABLET, EXTENDED RELEASE ORAL DAILY
Qty: 90 TABLET | Refills: 1 | Status: SHIPPED | OUTPATIENT
Start: 2025-01-16

## 2025-01-16 RX ORDER — LEVOTHYROXINE SODIUM 50 UG/1
50 TABLET ORAL
Qty: 100 TABLET | Refills: 1 | Status: SHIPPED | OUTPATIENT
Start: 2025-01-16

## 2025-02-17 DIAGNOSIS — E66.812 CLASS 2 SEVERE OBESITY DUE TO EXCESS CALORIES WITH SERIOUS COMORBIDITY AND BODY MASS INDEX (BMI) OF 38.0 TO 38.9 IN ADULT (HCC): ICD-10-CM

## 2025-02-17 DIAGNOSIS — E66.01 CLASS 2 SEVERE OBESITY DUE TO EXCESS CALORIES WITH SERIOUS COMORBIDITY AND BODY MASS INDEX (BMI) OF 38.0 TO 38.9 IN ADULT (HCC): ICD-10-CM

## 2025-02-21 ENCOUNTER — OFFICE VISIT (OUTPATIENT)
Age: 65
End: 2025-02-21

## 2025-02-21 DIAGNOSIS — Z48.89 ENCOUNTER FOR FOLLOW-UP CARE INVOLVING PLASTIC SURGERY: Primary | ICD-10-CM

## 2025-02-21 PROCEDURE — RECHECK: Performed by: PHYSICIAN ASSISTANT

## 2025-02-21 NOTE — PROGRESS NOTES
Plastic Surgery Follow Up Note:     HPI: The patient is a very pleasant 65-year-old female presenting today for a postoperative follow-up status post bilateral brachioplasty on 11/8/2024.    The patient states that RUE wound has fully healed. She denies any pain, other wounds, or other issues.  She has been applying scar care to her arms daily. She has no concerns today.    Past Medical History:   Diagnosis Date    Disease of thyroid gland     Eating disorder Birth    Over eating    Hypertension     Obesity     Postgastrectomy malabsorption     Shingles 12/7/20    Mild case - resolved     Patient Active Problem List   Diagnosis    Ischemic necrosis of small bowel (HCC)    Internal hernia    Hypertension    Hypothyroidism    Iron deficiency anemia    S/P exploratory laparotomy    Small bowel obstruction (HCC)    Postsurgical malabsorption    Weight gain following gastric bypass surgery    COVID-19 virus infection    Encounter to establish care    Annual physical exam    Class 2 severe obesity due to excess calories with serious comorbidity and body mass index (BMI) of 38.0 to 38.9 in adult (HCC)    Ventral hernia without obstruction or gangrene    Fungal infection    Preoperative clearance    Edema of both upper arms       Current Outpatient Medications:     calcium citrate (CALCITRATE) 950 MG tablet, Take 1 tablet by mouth daily, Disp: , Rfl:     cholecalciferol (VITAMIN D3) 25 mcg (1,000 units) tablet, , Disp: , Rfl:     collagenase (SANTYL) ointment, Apply topically daily Wound measurements: 5 cm x 2.5 cm x 0.1 cm, Disp: 90 g, Rfl: 1    furosemide (LASIX) 20 mg tablet, Take 1 tablet (20 mg total) by mouth daily, Disp: 14 tablet, Rfl: 0    levothyroxine (Euthyrox) 50 mcg tablet, Take 1 tablet (50 mcg total) by mouth daily in the early morning, Disp: 100 tablet, Rfl: 1    metoprolol succinate (TOPROL-XL) 25 mg 24 hr tablet, Take 1 tablet (25 mg total) by mouth daily, Disp: 90 tablet, Rfl: 1    Multiple  Vitamins-Minerals (MULTIVITAMIN WITH MINERALS) tablet, Take 1 tablet by mouth daily, Disp: , Rfl:     nystatin (MYCOSTATIN) cream, Apply topically 2 (two) times a day, Disp: 30 g, Rfl: 0    Probiotic Product (PROBIOTIC DAILY PO), Take by mouth, Disp: , Rfl:     Semaglutide-Weight Management (WEGOVY) 2.4 MG/0.75ML, Inject 0.75 mL (2.4 mg total) under the skin every 10 days Last taken 10/30/24, Disp: 2.25 mL, Rfl: 0    Past Surgical History:   Procedure Laterality Date    BARIATRIC SURGERY  2015     SECTION      GASTRIC BYPASS      HI EXCISION EXCESSIVE SKIN & SUBQ TISSUE ARM Bilateral 2024    Procedure: BRACHIOPLASTY;  Surgeon: Lanre Haywood MD;  Location: [facility];  Service: Plastics    HI LAPS ABD PRTM&OMENTUM DX W/WO SPEC BR/WA SPX N/A 2019    Procedure: LAPAROSCOPY DIAGNOSTIC CONVERTED TO OPEN; SMALL BOWEL RESECTION; LYSIS OF ADHESIONS;  Surgeon: John Simon MD;  Location: [facility];  Service: General    SMALL INTESTINE SURGERY  19    TRIGGER FINGER RELEASE Right      Social History     Tobacco Use    Smoking status: Former     Current packs/day: 0.00     Average packs/day: 1 pack/day for 15.6 years (15.6 ttl pk-yrs)     Types: Cigarettes     Start date: 1976     Quit date: 1992     Years since quittin.1     Passive exposure: Past    Smokeless tobacco: Never   Substance Use Topics    Alcohol use: Yes     Alcohol/week: 2.0 standard drinks of alcohol     Comment: Rarely     Objective:  There were no vitals filed for this visit.    Physical Exam:  General: NAD, alert, cooperative    BLE: Bilateral incisions are clean, dry, and intact. There is no evidence of hematoma or seroma formation.  There is no surrounding erythema, wounds, drainage, or signs of infection.  Expected amount of postoperative edema, within normal limits.      Assessment: 65-year-old female status post bilateral brachioplasty    Plan:  Patient is now fully healed. Educated patient on continuing  scar massage. Continue compression prn, and activity AT. RTO about 3 months for f/u. Will take postop photos.         Pauly Urbano PA-C  Plastic & Reconstructive Surgery

## 2025-04-15 DIAGNOSIS — I10 PRIMARY HYPERTENSION: ICD-10-CM

## 2025-04-15 DIAGNOSIS — E03.9 HYPOTHYROIDISM, UNSPECIFIED TYPE: ICD-10-CM

## 2025-04-15 DIAGNOSIS — E66.01 CLASS 2 SEVERE OBESITY DUE TO EXCESS CALORIES WITH SERIOUS COMORBIDITY AND BODY MASS INDEX (BMI) OF 38.0 TO 38.9 IN ADULT (HCC): ICD-10-CM

## 2025-04-15 DIAGNOSIS — E66.812 CLASS 2 SEVERE OBESITY DUE TO EXCESS CALORIES WITH SERIOUS COMORBIDITY AND BODY MASS INDEX (BMI) OF 38.0 TO 38.9 IN ADULT (HCC): ICD-10-CM

## 2025-04-16 RX ORDER — METOPROLOL SUCCINATE 25 MG/1
25 TABLET, EXTENDED RELEASE ORAL DAILY
Qty: 90 TABLET | Refills: 1 | Status: SHIPPED | OUTPATIENT
Start: 2025-04-16

## 2025-04-16 RX ORDER — LEVOTHYROXINE SODIUM 50 UG/1
50 TABLET ORAL
Qty: 100 TABLET | Refills: 1 | Status: SHIPPED | OUTPATIENT
Start: 2025-04-16

## 2025-04-22 DIAGNOSIS — Z12.31 ENCOUNTER FOR SCREENING MAMMOGRAM FOR MALIGNANT NEOPLASM OF BREAST: ICD-10-CM

## 2025-04-22 DIAGNOSIS — M81.0 POST-MENOPAUSAL OSTEOPOROSIS: Primary | ICD-10-CM

## 2025-05-28 ENCOUNTER — HOSPITAL ENCOUNTER (OUTPATIENT)
Age: 65
Discharge: HOME | End: 2025-05-28
Attending: NURSE PRACTITIONER
Payer: COMMERCIAL

## 2025-05-28 VITALS — HEIGHT: 62 IN | BODY MASS INDEX: 29.45 KG/M2

## 2025-05-28 DIAGNOSIS — M81.0 POST-MENOPAUSAL OSTEOPOROSIS: ICD-10-CM

## 2025-05-28 DIAGNOSIS — Z12.31 ENCOUNTER FOR SCREENING MAMMOGRAM FOR MALIGNANT NEOPLASM OF BREAST: ICD-10-CM

## 2025-05-28 PROCEDURE — 77063 BREAST TOMOSYNTHESIS BI: CPT

## 2025-05-28 PROCEDURE — 77067 SCR MAMMO BI INCL CAD: CPT

## 2025-05-28 PROCEDURE — 77080 DXA BONE DENSITY AXIAL: CPT

## 2025-06-03 ENCOUNTER — RESULTS FOLLOW-UP (OUTPATIENT)
Dept: FAMILY MEDICINE CLINIC | Facility: CLINIC | Age: 65
End: 2025-06-03

## 2025-06-13 ENCOUNTER — APPOINTMENT (OUTPATIENT)
Dept: LAB | Facility: MEDICAL CENTER | Age: 65
End: 2025-06-13

## 2025-06-13 DIAGNOSIS — Z00.8 HEALTH EXAMINATION IN POPULATION SURVEYS: ICD-10-CM

## 2025-06-13 LAB
CHOLEST SERPL-MCNC: 175 MG/DL (ref ?–200)
HDLC SERPL-MCNC: 50 MG/DL
LDLC SERPL CALC-MCNC: 99 MG/DL (ref 0–100)
NONHDLC SERPL-MCNC: 125 MG/DL
TRIGL SERPL-MCNC: 132 MG/DL (ref ?–150)

## 2025-06-13 PROCEDURE — 80061 LIPID PANEL: CPT

## 2025-06-13 PROCEDURE — 83036 HEMOGLOBIN GLYCOSYLATED A1C: CPT

## 2025-06-13 PROCEDURE — 36415 COLL VENOUS BLD VENIPUNCTURE: CPT

## 2025-06-14 LAB
EST. AVERAGE GLUCOSE BLD GHB EST-MCNC: 103 MG/DL
HBA1C MFR BLD: 5.2 %

## 2025-06-17 ENCOUNTER — OFFICE VISIT (OUTPATIENT)
Dept: FAMILY MEDICINE CLINIC | Facility: CLINIC | Age: 65
End: 2025-06-17
Payer: COMMERCIAL

## 2025-06-17 VITALS
BODY MASS INDEX: 28.89 KG/M2 | HEART RATE: 66 BPM | SYSTOLIC BLOOD PRESSURE: 140 MMHG | HEIGHT: 62 IN | OXYGEN SATURATION: 99 % | TEMPERATURE: 98.2 F | DIASTOLIC BLOOD PRESSURE: 80 MMHG | RESPIRATION RATE: 14 BRPM | WEIGHT: 157 LBS

## 2025-06-17 DIAGNOSIS — M81.6 LOCALIZED OSTEOPOROSIS WITHOUT CURRENT PATHOLOGICAL FRACTURE: ICD-10-CM

## 2025-06-17 DIAGNOSIS — E66.812 CLASS 2 SEVERE OBESITY DUE TO EXCESS CALORIES WITH SERIOUS COMORBIDITY AND BODY MASS INDEX (BMI) OF 38.0 TO 38.9 IN ADULT (HCC): ICD-10-CM

## 2025-06-17 DIAGNOSIS — I10 PRIMARY HYPERTENSION: ICD-10-CM

## 2025-06-17 DIAGNOSIS — E66.01 CLASS 2 SEVERE OBESITY DUE TO EXCESS CALORIES WITH SERIOUS COMORBIDITY AND BODY MASS INDEX (BMI) OF 38.0 TO 38.9 IN ADULT (HCC): ICD-10-CM

## 2025-06-17 DIAGNOSIS — Z00.00 ANNUAL PHYSICAL EXAM: Primary | ICD-10-CM

## 2025-06-17 PROCEDURE — 99397 PER PM REEVAL EST PAT 65+ YR: CPT | Performed by: NURSE PRACTITIONER

## 2025-06-17 PROCEDURE — 99214 OFFICE O/P EST MOD 30 MIN: CPT | Performed by: NURSE PRACTITIONER

## 2025-06-17 NOTE — ASSESSMENT & PLAN NOTE
Patient reports she has been taking Wegovy every 3 weeks has been doing well.  Has been able to maintain BMI under 30.  Continue with diet continue exercise continue medication regimen  Orders:    Semaglutide-Weight Management (WEGOVY) 2.4 MG/0.75ML; Inject 0.75 mL (2.4 mg total) under the skin every 10 days for 4 doses Last taken 10/30/24

## 2025-06-17 NOTE — PROGRESS NOTES
Adult Annual Physical  Name: Yvonne Hirsch      : 1960      MRN: [id number]  Encounter Provider: FINESSE Martínez  Encounter Date: 2025   Encounter department: [facility]    :  Assessment & Plan  Annual physical exam  Annual physical completed.  Patient is doing well.  No current concerns.  Additional blood work ordered  Orders:    Albumin / creatinine urine ratio; Future    Comprehensive metabolic panel; Future    CBC and differential; Future    Class 2 severe obesity due to excess calories with serious comorbidity and body mass index (BMI) of 38.0 to 38.9 in adult (HCC)    Patient reports she has been taking Wegovy every 3 weeks has been doing well.  Has been able to maintain BMI under 30.  Continue with diet continue exercise continue medication regimen  Orders:    Semaglutide-Weight Management (WEGOVY) 2.4 MG/0.75ML; Inject 0.75 mL (2.4 mg total) under the skin every 10 days for 4 doses Last taken 10/30/24    Localized osteoporosis without current pathological fracture  Patient has osteoporosis.  Does take a calcium supplement does take vitamin D.  Discussed options for management.  Evenity ordered.  Continue weightbearing exercises  Orders:    romosozumab-aqqg (EVENITY) subcutaneous injection 210 mg    Primary hypertension  Blood pressure slightly elevated in the office but patient has a record of daily blood pressures generally blood pressures of 130/80  Continue medication continue heart healthy diet continue maintaining weight loss             Preventive Screenings:  - Diabetes Screening: screening up-to-date  - Hepatitis C screening: screening up-to-date   - HIV screening: patient declines   - Cervical cancer screening: screening not indicated and patient declines   - Breast cancer screening: screening up-to-date   - Colon cancer screening: screening up-to-date   - Lung cancer screening: screening not indicated   - Osteoporosis screening: has osteoporosis and  screening not indicated     Immunizations:  - Immunizations due: Prevnar 20, Tdap and Zoster (Shingrix)    Counseling/Anticipatory Guidance:  - Alcohol: discussed moderation in alcohol intake and recommendations for healthy alcohol use.   - Drug use: discussed harms of illicit drug use and how it can negatively impact mental/physical health.   - Tobacco use: discussed harms of tobacco use and management options for quitting.   - Dental health: discussed importance of regular tooth brushing, flossing, and dental visits.   - Sexual health: discussed sexually transmitted diseases, partner selection, use of condoms, avoidance of unintended pregnancy, and contraceptive alternatives.   - Diet: discussed recommendations for a healthy/well-balanced diet.   - Exercise: the importance of regular exercise/physical activity was discussed. Recommend exercise 3-5 times per week for at least 30 minutes.   - Injury prevention: discussed safety/seat belts, safety helmets, smoke detectors, carbon monoxide detectors, and smoking near bedding or upholstery.       Depression Screening and Follow-up Plan: Patient was screened for depression during today's encounter. They screened negative with a PHQ-2 score of 0.      Urinary Incontinence Plan of Care: counseling topics discussed: practice Kegel (pelvic floor strengthening) exercises, use restroom every 2 hours, limit alcohol, caffeine, spicy foods, and acidic foods, limiting fluid intake 3-4 hours before bed and preventing constipation.         History of Present Illness     Adult Annual Physical:  Patient presents for annual physical.     Diet and Physical Activity:  - Diet/Nutrition: well balanced diet.  - Exercise: walking.    Depression Screening:  - PHQ-2 Score: 0    General Health:  - Sleep: sleeps well.  - Hearing: normal hearing bilateral ears.  - Vision: wears glasses and most recent eye exam < 1 year ago.  - Dental:. pat    /GYN Health:  - Follows with GYN: no.   -  "Menopause: postmenopausal.   - History of STDs: no  - Contraception: menopause.      Advanced Care Planning:  - Has an advanced directive?: no    - Has a durable medical POA?: no    - ACP document given to patient?: no      Review of Systems      Objective   /80 (BP Location: Left arm, Patient Position: Sitting, Cuff Size: Extra-Large)   Pulse 66   Temp 98.2 °F (36.8 °C) (Tympanic)   Resp 14   Ht 5' 2\" (1.575 m)   Wt 71.2 kg (157 lb)   SpO2 99%   BMI 28.72 kg/m²     Physical Exam  Constitutional:       General: She is not in acute distress.     Appearance: Normal appearance. She is not ill-appearing.   HENT:      Head: Normocephalic and atraumatic.      Nose: Nose normal.      Mouth/Throat:      Mouth: Mucous membranes are moist.     Eyes:      Pupils: Pupils are equal, round, and reactive to light.       Cardiovascular:      Rate and Rhythm: Normal rate and regular rhythm.      Pulses: Normal pulses.   Pulmonary:      Effort: Pulmonary effort is normal. No respiratory distress.      Breath sounds: Normal breath sounds.   Chest:      Chest wall: No tenderness.   Abdominal:      General: Abdomen is flat. Bowel sounds are normal. There is no distension.      Palpations: There is no mass.      Tenderness: There is no abdominal tenderness.     Musculoskeletal:         General: Normal range of motion.      Cervical back: Normal range of motion and neck supple.     Skin:     General: Skin is warm and dry.     Neurological:      General: No focal deficit present.      Mental Status: She is alert and oriented to person, place, and time.     Psychiatric:         Mood and Affect: Mood normal.         Behavior: Behavior normal.         Thought Content: Thought content normal.         Judgment: Judgment normal.         "

## 2025-06-17 NOTE — ASSESSMENT & PLAN NOTE
Blood pressure slightly elevated in the office but patient has a record of daily blood pressures generally blood pressures of 130/80  Continue medication continue heart healthy diet continue maintaining weight loss

## 2025-06-17 NOTE — PATIENT INSTRUCTIONS
"Patient Education     Routine physical for adults   The Basics   Written by the doctors and editors at [facility]   What is a physical? -- A physical is a routine visit, or \"check-up,\" with your doctor. You might also hear it called a \"wellness visit\" or \"preventive visit.\"  During each visit, the doctor will:   Ask about your physical and mental health   Ask about your habits, behaviors, and lifestyle   Do an exam   Give you vaccines if needed   Talk to you about any medicines you take   Give advice about your health   Answer your questions  Getting regular check-ups is an important part of taking care of your health. It can help your doctor find and treat any problems you have. But it's also important for preventing health problems.  A routine physical is different from a \"sick visit.\" A sick visit is when you see a doctor because of a health concern or problem. Since physicals are scheduled ahead of time, you can think about what you want to ask the doctor.  How often should I get a physical? -- It depends on your age and health. In general, for people age 21 years and older:   If you are younger than 50 years, you might be able to get a physical every 3 years.   If you are 50 years or older, your doctor might recommend a physical every year.  If you have an ongoing health condition, like diabetes or high blood pressure, your doctor will probably want to see you more often.  What happens during a physical? -- In general, each visit will include:   Physical exam - The doctor or nurse will check your height, weight, heart rate, and blood pressure. They will also look at your eyes and ears. They will ask about how you are feeling and whether you have any symptoms that bother you.   Medicines - It's a good idea to bring a list of all the medicines you take to each doctor visit. Your doctor will talk to you about your medicines and answer any questions. Tell them if you are having any side effects that bother you. You " "should also tell them if you are having trouble paying for any of your medicines.   Habits and behaviors - This includes:   Your diet   Your exercise habits   Whether you smoke, drink alcohol, or use drugs   Whether you are sexually active   Whether you feel safe at home  Your doctor will talk to you about things you can do to improve your health and lower your risk of health problems. They will also offer help and support. For example, if you want to quit smoking, they can give you advice and might prescribe medicines. If you want to improve your diet or get more physical activity, they can help you with this, too.   Lab tests, if needed - The tests you get will depend on your age and situation. For example, your doctor might want to check your:   Cholesterol   Blood sugar   Iron level   Vaccines - The recommended vaccines will depend on your age, health, and what vaccines you already had. Vaccines are very important because they can prevent certain serious or deadly infections.   Discussion of screening - \"Screening\" means checking for diseases or other health problems before they cause symptoms. Your doctor can recommend screening based on your age, risk, and preferences. This might include tests to check for:   Cancer, such as breast, prostate, cervical, ovarian, colorectal, prostate, lung, or skin cancer   Sexually transmitted infections, such as chlamydia and gonorrhea   Mental health conditions like depression and anxiety  Your doctor will talk to you about the different types of screening tests. They can help you decide which screenings to have. They can also explain what the results might mean.   Answering questions - The physical is a good time to ask the doctor or nurse questions about your health. If needed, they can refer you to other doctors or specialists, too.  Adults older than 65 years often need other care, too. As you get older, your doctor will talk to you about:   How to prevent falling at " home   Hearing or vision tests   Memory testing   How to take your medicines safely   Making sure that you have the help and support you need at home  All topics are updated as new evidence becomes available and our peer review process is complete.  This topic retrieved from [email or web address] on: May 02, 2024.  Topic 641484 Version 1.0  Release: 32.4.3 - C32.122  © 2024 UpToDate, Inc. and/or its affiliates. All rights reserved.  Consumer Information Use and Disclaimer   Disclaimer: This generalized information is a limited summary of diagnosis, treatment, and/or medication information. It is not meant to be comprehensive and should be used as a tool to help the user understand and/or assess potential diagnostic and treatment options. It does NOT include all information about conditions, treatments, medications, side effects, or risks that may apply to a specific patient. It is not intended to be medical advice or a substitute for the medical advice, diagnosis, or treatment of a health care provider based on the health care provider's examination and assessment of a patient's specific and unique circumstances. Patients must speak with a health care provider for complete information about their health, medical questions, and treatment options, including any risks or benefits regarding use of medications. This information does not endorse any treatments or medications as safe, effective, or approved for treating a specific patient. Up[employer]Date, Inc. and its affiliates disclaim any warranty or liability relating to this information or the use thereof.The use of this information is governed by the Terms of Use, available at https://www.[employer].com/en/know/clinical-effectiveness-terms. 2024© UpToDate, Inc. and its affiliates and/or licensors. All rights reserved.  Copyright   © 2024 UpToDate, Inc. and/or its affiliates. All rights reserved.

## 2025-06-17 NOTE — ASSESSMENT & PLAN NOTE
Annual physical completed.  Patient is doing well.  No current concerns.  Additional blood work ordered  Orders:    Albumin / creatinine urine ratio; Future    Comprehensive metabolic panel; Future    CBC and differential; Future

## 2025-06-20 ENCOUNTER — TELEPHONE (OUTPATIENT)
Age: 65
End: 2025-06-20

## 2025-06-20 NOTE — TELEPHONE ENCOUNTER
[employer] called regarding patients evenity benefit summary, they have been trying to fax the report to the office but it will not send, fax number was verified. Please contact [employer] services at [phone], or access the [employer] portal to view patients benefits thank you

## 2025-06-24 ENCOUNTER — TELEPHONE (OUTPATIENT)
Age: 65
End: 2025-06-24

## 2025-06-24 DIAGNOSIS — I10 PRIMARY HYPERTENSION: ICD-10-CM

## 2025-06-24 DIAGNOSIS — E03.9 HYPOTHYROIDISM, UNSPECIFIED TYPE: ICD-10-CM

## 2025-06-24 NOTE — TELEPHONE ENCOUNTER
PA for (WEGOVY) 2.4 MG/0.75ML SUBMITTED to     via    []CMM-KEY:   [x]Surescripts-Case ID # [id number]     []Availity-Auth ID # NDC #   []Faxed to plan   []Other website   []Phone call Case ID #     [x]PA sent as URGENT    All office notes, labs and other pertaining documents and studies sent. Clinical questions answered. Awaiting determination from insurance company.     Turnaround time for your insurance to make a decision on your Prior Authorization can take 7-21 business days.

## 2025-06-24 NOTE — TELEPHONE ENCOUNTER
Patient called stating she needs another prior authorization for her Wegovy. States her's has .

## 2025-06-25 RX ORDER — LEVOTHYROXINE SODIUM 50 UG/1
50 TABLET ORAL
Qty: 30 TABLET | Refills: 0 | Status: SHIPPED | OUTPATIENT
Start: 2025-06-25

## 2025-06-25 RX ORDER — METOPROLOL SUCCINATE 25 MG/1
25 TABLET, EXTENDED RELEASE ORAL DAILY
Qty: 90 TABLET | Refills: 1 | Status: SHIPPED | OUTPATIENT
Start: 2025-06-25

## 2025-06-26 ENCOUNTER — TELEPHONE (OUTPATIENT)
Dept: FAMILY MEDICINE CLINIC | Facility: CLINIC | Age: 65
End: 2025-06-26

## 2025-06-26 NOTE — TELEPHONE ENCOUNTER
PA for Wegovy 2.4 mg  APPROVED     Date(s) approved June 24, 2025 to June 24, 2026     Case #[id number]     Patient advised by          []MyChart Message  []Phone call   [x]LMOM  []L/M to call office as no active Communication consent on file  []Unable to leave detailed message as VM not approved on Communication consent       Pharmacy advised by    [x]Fax  []Phone call  []Secure Chat       Approval letter scanned into Media No not at time of determination

## 2025-07-01 DIAGNOSIS — E03.9 ACQUIRED HYPOTHYROIDISM: Primary | ICD-10-CM

## 2025-07-17 DIAGNOSIS — M81.0 AGE-RELATED OSTEOPOROSIS WITHOUT CURRENT PATHOLOGICAL FRACTURE: Primary | ICD-10-CM

## 2025-07-17 RX ORDER — ALENDRONATE SODIUM 70 MG/1
70 TABLET ORAL
Qty: 12 TABLET | Refills: 3 | Status: SHIPPED | OUTPATIENT
Start: 2025-07-17

## 2025-08-01 ENCOUNTER — PATIENT MESSAGE (OUTPATIENT)
Dept: FAMILY MEDICINE CLINIC | Facility: CLINIC | Age: 65
End: 2025-08-01

## 2025-08-01 DIAGNOSIS — E66.812 CLASS 2 SEVERE OBESITY DUE TO EXCESS CALORIES WITH SERIOUS COMORBIDITY AND BODY MASS INDEX (BMI) OF 38.0 TO 38.9 IN ADULT (HCC): Primary | ICD-10-CM

## 2025-08-01 DIAGNOSIS — E66.01 CLASS 2 SEVERE OBESITY DUE TO EXCESS CALORIES WITH SERIOUS COMORBIDITY AND BODY MASS INDEX (BMI) OF 38.0 TO 38.9 IN ADULT (HCC): Primary | ICD-10-CM

## 2025-08-01 DIAGNOSIS — E66.01 CLASS 2 SEVERE OBESITY DUE TO EXCESS CALORIES WITH SERIOUS COMORBIDITY AND BODY MASS INDEX (BMI) OF 38.0 TO 38.9 IN ADULT (HCC): ICD-10-CM

## 2025-08-01 DIAGNOSIS — E66.812 CLASS 2 SEVERE OBESITY DUE TO EXCESS CALORIES WITH SERIOUS COMORBIDITY AND BODY MASS INDEX (BMI) OF 38.0 TO 38.9 IN ADULT (HCC): ICD-10-CM

## 2025-08-01 RX ORDER — SEMAGLUTIDE 2.4 MG/.75ML
2.4 INJECTION, SOLUTION SUBCUTANEOUS WEEKLY
Qty: 9 ML | Refills: 0 | Status: SHIPPED | OUTPATIENT
Start: 2025-08-01 | End: 2025-10-18

## 2025-08-04 RX ORDER — SEMAGLUTIDE 2.4 MG/.75ML
2.4 INJECTION, SOLUTION SUBCUTANEOUS WEEKLY
Qty: 9 ML | Refills: 0 | OUTPATIENT
Start: 2025-08-04 | End: 2025-10-21

## (undated) DEVICE — GLOVE SRG BIOGEL 7

## (undated) DEVICE — SUT SILK 2-0 SH 30 IN K833H

## (undated) DEVICE — CURITY NON-ADHERENT STRIPS: Brand: CURITY

## (undated) DEVICE — COBAN 4 IN STERILE

## (undated) DEVICE — PROXIMATE SKIN STAPLERS (35 WIDE) CONTAINS 35 STAINLESS STEEL STAPLES (FIXED HEAD): Brand: PROXIMATE

## (undated) DEVICE — DRAIN SPONGES,6 PLY: Brand: EXCILON

## (undated) DEVICE — STAPLER ENDO GIA ROTICULATOR 60-2.5

## (undated) DEVICE — VISUALIZATION SYSTEM: Brand: CLEARIFY

## (undated) DEVICE — 4-PORT MANIFOLD: Brand: NEPTUNE 2

## (undated) DEVICE — MEDI-VAC YANKAUER SUCTION HANDLE W/BULBOUS AND CONTROL VENT: Brand: CARDINAL HEALTH

## (undated) DEVICE — ABDOMINAL PAD: Brand: DERMACEA

## (undated) DEVICE — SYRINGE 30ML LL

## (undated) DEVICE — NEEDLE 25G X 1 1/2

## (undated) DEVICE — PACK UNIVERSAL DRAPES SUB-Q ICD

## (undated) DEVICE — HARMONIC 1100 SHEARS, 36CM SHAFT LENGTH: Brand: HARMONIC

## (undated) DEVICE — ENDOPATH PNEUMONEEDLE INSUFFLATION NEEDLES WITH LUER LOCK CONNECTORS 120MM: Brand: ENDOPATH

## (undated) DEVICE — SURGICAL GOWN, XL SMARTSLEEVE: Brand: CONVERTORS

## (undated) DEVICE — ALLENTOWN LAP CHOLE APP PACK: Brand: CARDINAL HEALTH

## (undated) DEVICE — TUBING LIPOSUCTION ASPIRATION 12FT STERILE

## (undated) DEVICE — TUBING SMOKE EVAC W/FILTRATION DEVICE PLUMEPORT ACTIV

## (undated) DEVICE — SUT SILK 2-0 FS 18 IN 685G

## (undated) DEVICE — SPONGE STICK WITH PVP-I: Brand: KENDALL

## (undated) DEVICE — SCD SEQUENTIAL COMPRESSION COMFORT SLEEVE MEDIUM KNEE LENGTH: Brand: KENDALL SCD

## (undated) DEVICE — TELFA NON-ADHERENT ABSORBENT DRESSING: Brand: TELFA

## (undated) DEVICE — PMI DISPOSABLE PUNCTURE CLOSURE DEVICE / SUTURE GRASPER: Brand: PMI

## (undated) DEVICE — INTENDED FOR TISSUE SEPARATION, AND OTHER PROCEDURES THAT REQUIRE A SHARP SURGICAL BLADE TO PUNCTURE OR CUT.: Brand: BARD-PARKER SAFETY BLADES SIZE 11, STERILE

## (undated) DEVICE — CHLORAPREP HI-LITE 26ML ORANGE

## (undated) DEVICE — ADHESIVE SKIN CLSR DERMABOND NX

## (undated) DEVICE — [HIGH FLOW INSUFFLATOR,  DO NOT USE IF PACKAGE IS DAMAGED,  KEEP DRY,  KEEP AWAY FROM SUNLIGHT,  PROTECT FROM HEAT AND RADIOACTIVE SOURCES.]: Brand: PNEUMOSURE

## (undated) DEVICE — GAUZE SPONGES,16 PLY: Brand: CURITY

## (undated) DEVICE — DRAPE SHEET THREE QUARTER

## (undated) DEVICE — GLOVE INDICATOR PI UNDERGLOVE SZ 7 BLUE

## (undated) DEVICE — DISPOSABLE OR TOWEL: Brand: CARDINAL HEALTH

## (undated) DEVICE — ELECTRODE BLADE MOD E-Z CLEAN  2.75IN 7CM -0012AM

## (undated) DEVICE — WEBRIL 6 IN UNSTERILE

## (undated) DEVICE — DRAPE EQUIPMENT RF WAND

## (undated) DEVICE — STERILE MUSCLE FLAP PACK: Brand: CARDINAL HEALTH

## (undated) DEVICE — LINER SUCTION CANISTER 2000ML DISP

## (undated) DEVICE — KERLIX BANDAGE ROLL: Brand: KERLIX

## (undated) DEVICE — TUBING ASPIRATION LIPOSUCTION SET 12FT

## (undated) DEVICE — GLOVE SRG BIOGEL ECLIPSE 7.5

## (undated) DEVICE — IRRIG ENDO FLO TUBING

## (undated) DEVICE — STOCKINETTE IMPERVIOUS LG

## (undated) DEVICE — SEALANT FIBRIN VISTASEAL 10ML

## (undated) DEVICE — INTENDED FOR TISSUE SEPARATION, AND OTHER PROCEDURES THAT REQUIRE A SHARP SURGICAL BLADE TO PUNCTURE OR CUT.: Brand: BARD-PARKER ® CARBON RIB-BACK BLADES

## (undated) DEVICE — TUBING SUCTION 5MM X 12 FT

## (undated) DEVICE — POWER SHELL SIGNIA

## (undated) DEVICE — JP CHAN DRN SIL HUBLESS 15FR W/TRO: Brand: CARDINAL HEALTH

## (undated) DEVICE — VIOLET BRAIDED (POLYGLACTIN 910), SYNTHETIC ABSORBABLE SUTURE: Brand: COATED VICRYL

## (undated) DEVICE — TUBE CONNECTING PSI-TEC FULTER

## (undated) DEVICE — ACE WRAP 6 IN STERILE

## (undated) DEVICE — SUT MONOCRYL 4-0 PS-2 27 IN Y426H

## (undated) DEVICE — UTILITY MARKER,BLACK WITH LABELS: Brand: DEVON

## (undated) DEVICE — DRAPE TOWEL: Brand: CONVERTORS

## (undated) DEVICE — GLOVE SRG BIOGEL 6.5

## (undated) DEVICE — PADDING CAST 4 IN  COTTON STRL

## (undated) DEVICE — ACE WRAP 4 IN STERILE

## (undated) DEVICE — PAD GROUNDING DUAL ADULT

## (undated) DEVICE — GLOVE INDICATOR PI UNDERGLOVE SZ 6.5 BLUE

## (undated) DEVICE — PLUMEPEN PRO 10FT

## (undated) DEVICE — TIBURON LAPAROSCOPIC ABDOMINAL DRAPE: Brand: CONVERTORS

## (undated) DEVICE — NEEDLE HYPO 22G X 1-1/2 IN

## (undated) DEVICE — TOWEL SURG XR DETECT GREEN STRL RFD

## (undated) DEVICE — TRAY FOLEY 16FR URIMETER SILICONE SURESTEP

## (undated) DEVICE — KELLY HEMOSTAT CURVED: Brand: CARDINAL HEALTH

## (undated) DEVICE — ENDOPATH XCEL BLADELESS TROCARS WITH STABILITY SLEEVES: Brand: ENDOPATH XCEL

## (undated) DEVICE — REM POLYHESIVE ADULT PATIENT RETURN ELECTRODE: Brand: VALLEYLAB

## (undated) DEVICE — JACKSON-PRATT 100CC BULB RESERVOIR: Brand: CARDINAL HEALTH